# Patient Record
Sex: FEMALE | Race: WHITE | NOT HISPANIC OR LATINO | Employment: UNEMPLOYED | URBAN - METROPOLITAN AREA
[De-identification: names, ages, dates, MRNs, and addresses within clinical notes are randomized per-mention and may not be internally consistent; named-entity substitution may affect disease eponyms.]

---

## 2019-04-02 ENCOUNTER — OFFICE VISIT (OUTPATIENT)
Dept: FAMILY MEDICINE CLINIC | Facility: CLINIC | Age: 59
End: 2019-04-02
Payer: COMMERCIAL

## 2019-04-02 VITALS
DIASTOLIC BLOOD PRESSURE: 82 MMHG | HEIGHT: 66 IN | WEIGHT: 163 LBS | SYSTOLIC BLOOD PRESSURE: 118 MMHG | HEART RATE: 85 BPM | OXYGEN SATURATION: 98 % | RESPIRATION RATE: 16 BRPM | TEMPERATURE: 98.3 F | BODY MASS INDEX: 26.2 KG/M2

## 2019-04-02 DIAGNOSIS — Z12.31 VISIT FOR SCREENING MAMMOGRAM: ICD-10-CM

## 2019-04-02 DIAGNOSIS — F33.42 RECURRENT MAJOR DEPRESSIVE DISORDER, IN FULL REMISSION (HCC): Primary | ICD-10-CM

## 2019-04-02 DIAGNOSIS — F51.01 PRIMARY INSOMNIA: ICD-10-CM

## 2019-04-02 DIAGNOSIS — M06.09 RHEUMATOID ARTHRITIS OF MULTIPLE SITES WITH NEGATIVE RHEUMATOID FACTOR (HCC): ICD-10-CM

## 2019-04-02 DIAGNOSIS — E66.3 OVERWEIGHT (BMI 25.0-29.9): ICD-10-CM

## 2019-04-02 PROBLEM — M05.79 RHEUMATOID ARTHRITIS INVOLVING MULTIPLE SITES WITH POSITIVE RHEUMATOID FACTOR (HCC): Status: ACTIVE | Noted: 2019-04-02

## 2019-04-02 PROCEDURE — 3008F BODY MASS INDEX DOCD: CPT | Performed by: FAMILY MEDICINE

## 2019-04-02 PROCEDURE — 99204 OFFICE O/P NEW MOD 45 MIN: CPT | Performed by: FAMILY MEDICINE

## 2019-04-02 PROCEDURE — 1036F TOBACCO NON-USER: CPT | Performed by: FAMILY MEDICINE

## 2019-04-02 RX ORDER — BUPROPION HYDROCHLORIDE 300 MG/1
300 TABLET ORAL EVERY MORNING
Qty: 90 TABLET | Refills: 0 | Status: SHIPPED | OUTPATIENT
Start: 2019-04-02 | End: 2019-04-30 | Stop reason: SDUPTHER

## 2019-04-02 RX ORDER — ESTRADIOL 10 UG/1
1 INSERT VAGINAL DAILY
COMMUNITY
End: 2019-04-02 | Stop reason: ALTCHOICE

## 2019-04-02 RX ORDER — CALCIUM CARBONATE 200(500)MG
1 TABLET,CHEWABLE ORAL DAILY
COMMUNITY
End: 2019-10-24

## 2019-04-02 RX ORDER — PHENTERMINE HYDROCHLORIDE 37.5 MG/1
37.5 TABLET ORAL DAILY
Qty: 30 TABLET | Refills: 0 | Status: SHIPPED | OUTPATIENT
Start: 2019-04-02 | End: 2019-05-02 | Stop reason: SDUPTHER

## 2019-04-02 RX ORDER — TRAZODONE HYDROCHLORIDE 100 MG/1
100 TABLET ORAL
COMMUNITY
End: 2019-05-02 | Stop reason: ALTCHOICE

## 2019-04-02 RX ORDER — FOLIC ACID 1 MG/1
1 TABLET ORAL DAILY
COMMUNITY

## 2019-04-02 RX ORDER — EPINEPHRINE NASAL SOLUTION 1 MG/ML
0.5 SOLUTION NASAL AS NEEDED
COMMUNITY
End: 2021-04-02

## 2019-04-02 RX ORDER — SERTRALINE HYDROCHLORIDE 100 MG/1
150 TABLET, FILM COATED ORAL DAILY
Qty: 135 TABLET | Refills: 0 | Status: SHIPPED | OUTPATIENT
Start: 2019-04-02 | End: 2019-04-30 | Stop reason: SDUPTHER

## 2019-04-02 RX ORDER — SERTRALINE HYDROCHLORIDE 100 MG/1
50 TABLET, FILM COATED ORAL DAILY
COMMUNITY
End: 2019-04-02 | Stop reason: SDUPTHER

## 2019-04-02 RX ORDER — TRIAMCINOLONE ACETONIDE 55 UG/1
SPRAY, METERED NASAL
COMMUNITY
End: 2019-05-02 | Stop reason: ALTCHOICE

## 2019-04-16 ENCOUNTER — OFFICE VISIT (OUTPATIENT)
Dept: RHEUMATOLOGY | Facility: CLINIC | Age: 59
End: 2019-04-16
Payer: COMMERCIAL

## 2019-04-16 ENCOUNTER — TELEPHONE (OUTPATIENT)
Dept: RHEUMATOLOGY | Facility: CLINIC | Age: 59
End: 2019-04-16

## 2019-04-16 VITALS
HEART RATE: 91 BPM | BODY MASS INDEX: 25.88 KG/M2 | DIASTOLIC BLOOD PRESSURE: 71 MMHG | SYSTOLIC BLOOD PRESSURE: 104 MMHG | WEIGHT: 161 LBS | HEIGHT: 66 IN

## 2019-04-16 DIAGNOSIS — M06.09 RHEUMATOID ARTHRITIS OF MULTIPLE SITES WITH NEGATIVE RHEUMATOID FACTOR (HCC): ICD-10-CM

## 2019-04-16 DIAGNOSIS — Z79.899 LONG-TERM USE OF IMMUNOSUPPRESSANT MEDICATION: ICD-10-CM

## 2019-04-16 DIAGNOSIS — M06.9 RHEUMATOID ARTHRITIS INVOLVING MULTIPLE SITES, UNSPECIFIED RHEUMATOID FACTOR PRESENCE: Primary | ICD-10-CM

## 2019-04-16 DIAGNOSIS — M85.80 OSTEOPENIA, UNSPECIFIED LOCATION: ICD-10-CM

## 2019-04-16 DIAGNOSIS — M47.816 OSTEOARTHRITIS OF LUMBAR SPINE, UNSPECIFIED SPINAL OSTEOARTHRITIS COMPLICATION STATUS: ICD-10-CM

## 2019-04-16 PROCEDURE — 99244 OFF/OP CNSLTJ NEW/EST MOD 40: CPT | Performed by: INTERNAL MEDICINE

## 2019-04-19 ENCOUNTER — APPOINTMENT (OUTPATIENT)
Dept: LAB | Facility: HOSPITAL | Age: 59
End: 2019-04-19
Attending: INTERNAL MEDICINE
Payer: COMMERCIAL

## 2019-04-19 ENCOUNTER — TRANSCRIBE ORDERS (OUTPATIENT)
Dept: ADMINISTRATIVE | Facility: HOSPITAL | Age: 59
End: 2019-04-19

## 2019-04-19 ENCOUNTER — HOSPITAL ENCOUNTER (OUTPATIENT)
Dept: RADIOLOGY | Facility: HOSPITAL | Age: 59
Discharge: HOME | End: 2019-04-19
Payer: COMMERCIAL

## 2019-04-19 ENCOUNTER — HOSPITAL ENCOUNTER (OUTPATIENT)
Dept: RADIOLOGY | Facility: HOSPITAL | Age: 59
Discharge: HOME | End: 2019-04-19
Attending: INTERNAL MEDICINE
Payer: COMMERCIAL

## 2019-04-19 VITALS — WEIGHT: 161 LBS | HEIGHT: 66 IN | BODY MASS INDEX: 25.88 KG/M2

## 2019-04-19 DIAGNOSIS — M06.9 RHEUMATOID ARTHRITIS, INVOLVING UNSPECIFIED SITE, UNSPECIFIED RHEUMATOID FACTOR PRESENCE: Primary | ICD-10-CM

## 2019-04-19 DIAGNOSIS — Z12.31 VISIT FOR SCREENING MAMMOGRAM: ICD-10-CM

## 2019-04-19 DIAGNOSIS — M06.9 RHEUMATOID ARTHRITIS INVOLVING MULTIPLE SITES, UNSPECIFIED RHEUMATOID FACTOR PRESENCE: ICD-10-CM

## 2019-04-19 DIAGNOSIS — M06.09 RHEUMATOID ARTHRITIS OF MULTIPLE SITES WITH NEGATIVE RHEUMATOID FACTOR (HCC): ICD-10-CM

## 2019-04-19 DIAGNOSIS — M06.9 RHEUMATOID ARTHRITIS, INVOLVING UNSPECIFIED SITE, UNSPECIFIED RHEUMATOID FACTOR PRESENCE: ICD-10-CM

## 2019-04-19 LAB
25(OH)D3 SERPL-MCNC: 64.9 NG/ML (ref 30–100)
BASOPHILS # BLD AUTO: 0.02 THOUSANDS/ΜL (ref 0–0.1)
BASOPHILS NFR BLD AUTO: 0 % (ref 0–1)
CRP SERPL QL: <3 MG/L
EOSINOPHIL # BLD AUTO: 0.05 THOUSAND/ΜL (ref 0–0.61)
EOSINOPHIL NFR BLD AUTO: 1 % (ref 0–6)
ERYTHROCYTE [DISTWIDTH] IN BLOOD BY AUTOMATED COUNT: 12.5 % (ref 11.6–15.1)
ERYTHROCYTE [SEDIMENTATION RATE] IN BLOOD: 25 MM/HOUR (ref 2–25)
HCT VFR BLD AUTO: 36.5 % (ref 34.8–46.1)
HGB BLD-MCNC: 12.3 G/DL (ref 11.5–15.4)
IMM GRANULOCYTES # BLD AUTO: 0.04 THOUSAND/UL (ref 0–0.2)
IMM GRANULOCYTES NFR BLD AUTO: 1 % (ref 0–2)
LYMPHOCYTES # BLD AUTO: 1.41 THOUSANDS/ΜL (ref 0.6–4.47)
LYMPHOCYTES NFR BLD AUTO: 18 % (ref 14–44)
MCH RBC QN AUTO: 32 PG (ref 26.8–34.3)
MCHC RBC AUTO-ENTMCNC: 33.7 G/DL (ref 31.4–37.4)
MCV RBC AUTO: 95 FL (ref 82–98)
MONOCYTES # BLD AUTO: 0.69 THOUSAND/ΜL (ref 0.17–1.22)
MONOCYTES NFR BLD AUTO: 9 % (ref 4–12)
NEUTROPHILS # BLD AUTO: 5.55 THOUSANDS/ΜL (ref 1.85–7.62)
NEUTS SEG NFR BLD AUTO: 71 % (ref 43–75)
NRBC BLD AUTO-RTO: 0 /100 WBCS
PLATELET # BLD AUTO: 291 THOUSANDS/UL (ref 149–390)
PMV BLD AUTO: 9.2 FL (ref 8.9–12.7)
RBC # BLD AUTO: 3.84 MILLION/UL (ref 3.81–5.12)
WBC # BLD AUTO: 7.76 THOUSAND/UL (ref 4.31–10.16)

## 2019-04-19 PROCEDURE — 86480 TB TEST CELL IMMUN MEASURE: CPT

## 2019-04-19 PROCEDURE — 86140 C-REACTIVE PROTEIN: CPT

## 2019-04-19 PROCEDURE — 82306 VITAMIN D 25 HYDROXY: CPT

## 2019-04-19 PROCEDURE — 86803 HEPATITIS C AB TEST: CPT

## 2019-04-19 PROCEDURE — 73130 X-RAY EXAM OF HAND: CPT

## 2019-04-19 PROCEDURE — 86705 HEP B CORE ANTIBODY IGM: CPT

## 2019-04-19 PROCEDURE — 36415 COLL VENOUS BLD VENIPUNCTURE: CPT

## 2019-04-19 PROCEDURE — 85652 RBC SED RATE AUTOMATED: CPT

## 2019-04-19 PROCEDURE — 86430 RHEUMATOID FACTOR TEST QUAL: CPT

## 2019-04-19 PROCEDURE — 77063 BREAST TOMOSYNTHESIS BI: CPT

## 2019-04-19 PROCEDURE — 86704 HEP B CORE ANTIBODY TOTAL: CPT

## 2019-04-19 PROCEDURE — 73630 X-RAY EXAM OF FOOT: CPT

## 2019-04-19 PROCEDURE — 85025 COMPLETE CBC W/AUTO DIFF WBC: CPT

## 2019-04-19 PROCEDURE — 77067 SCR MAMMO BI INCL CAD: CPT

## 2019-04-19 PROCEDURE — 86200 CCP ANTIBODY: CPT

## 2019-04-19 PROCEDURE — 87340 HEPATITIS B SURFACE AG IA: CPT

## 2019-04-20 LAB
HBV CORE AB SER QL: NORMAL
HBV CORE IGM SER QL: NORMAL
HBV SURFACE AG SER QL: NORMAL
HCV AB SER QL: NORMAL

## 2019-04-22 DIAGNOSIS — M06.9 RHEUMATOID ARTHRITIS INVOLVING MULTIPLE SITES, UNSPECIFIED RHEUMATOID FACTOR PRESENCE: Primary | ICD-10-CM

## 2019-04-22 LAB
GAMMA INTERFERON BACKGROUND BLD IA-ACNC: 0.03 IU/ML
M TB IFN-G BLD-IMP: NEGATIVE
M TB IFN-G CD4+ BCKGRND COR BLD-ACNC: 0 IU/ML
M TB IFN-G CD4+ BCKGRND COR BLD-ACNC: 0 IU/ML
MITOGEN IGNF BCKGRD COR BLD-ACNC: >10 IU/ML
RHEUMATOID FACT SER QL LA: NEGATIVE

## 2019-04-23 ENCOUNTER — TELEPHONE (OUTPATIENT)
Dept: OBGYN CLINIC | Facility: CLINIC | Age: 59
End: 2019-04-23

## 2019-04-23 LAB — CCP IGA+IGG SERPL IA-ACNC: 5 UNITS (ref 0–19)

## 2019-04-26 ENCOUNTER — APPOINTMENT (OUTPATIENT)
Dept: LAB | Facility: HOSPITAL | Age: 59
End: 2019-04-26
Payer: COMMERCIAL

## 2019-04-26 ENCOUNTER — TRANSCRIBE ORDERS (OUTPATIENT)
Dept: ADMINISTRATIVE | Facility: HOSPITAL | Age: 59
End: 2019-04-26

## 2019-04-26 DIAGNOSIS — M06.09 RHEUMATOID ARTHRITIS OF MULTIPLE SITES WITHOUT RHEUMATOID FACTOR (HCC): ICD-10-CM

## 2019-04-26 DIAGNOSIS — M06.09 RHEUMATOID ARTHRITIS OF MULTIPLE SITES WITHOUT RHEUMATOID FACTOR (HCC): Primary | ICD-10-CM

## 2019-04-26 LAB
ALBUMIN SERPL BCP-MCNC: 3.6 G/DL (ref 3.5–5)
ALP SERPL-CCNC: 127 U/L (ref 46–116)
ALT SERPL W P-5'-P-CCNC: 25 U/L (ref 12–78)
ANION GAP SERPL CALCULATED.3IONS-SCNC: 9 MMOL/L (ref 4–13)
AST SERPL W P-5'-P-CCNC: 24 U/L (ref 5–45)
BILIRUB SERPL-MCNC: 0.3 MG/DL (ref 0.2–1)
BUN SERPL-MCNC: 20 MG/DL (ref 5–25)
CALCIUM SERPL-MCNC: 8.9 MG/DL (ref 8.3–10.1)
CHLORIDE SERPL-SCNC: 107 MMOL/L (ref 100–108)
CHOLEST SERPL-MCNC: 207 MG/DL (ref 50–200)
CO2 SERPL-SCNC: 23 MMOL/L (ref 21–32)
CREAT SERPL-MCNC: 0.91 MG/DL (ref 0.6–1.3)
GFR SERPL CREATININE-BSD FRML MDRD: 70 ML/MIN/1.73SQ M
GLUCOSE P FAST SERPL-MCNC: 83 MG/DL (ref 65–99)
HDLC SERPL-MCNC: 70 MG/DL (ref 40–60)
LDLC SERPL CALC-MCNC: 128 MG/DL (ref 0–100)
POTASSIUM SERPL-SCNC: 4.2 MMOL/L (ref 3.5–5.3)
PROT SERPL-MCNC: 7 G/DL (ref 6.4–8.2)
SODIUM SERPL-SCNC: 139 MMOL/L (ref 136–145)
TRIGL SERPL-MCNC: 44 MG/DL

## 2019-04-26 PROCEDURE — 36415 COLL VENOUS BLD VENIPUNCTURE: CPT | Performed by: FAMILY MEDICINE

## 2019-04-26 PROCEDURE — 80053 COMPREHEN METABOLIC PANEL: CPT | Performed by: FAMILY MEDICINE

## 2019-04-26 PROCEDURE — 80061 LIPID PANEL: CPT

## 2019-04-30 DIAGNOSIS — F33.42 RECURRENT MAJOR DEPRESSIVE DISORDER, IN FULL REMISSION (HCC): ICD-10-CM

## 2019-04-30 RX ORDER — BUPROPION HYDROCHLORIDE 300 MG/1
300 TABLET ORAL EVERY MORNING
Qty: 90 TABLET | Refills: 0 | Status: SHIPPED | OUTPATIENT
Start: 2019-04-30 | End: 2019-05-02 | Stop reason: SDUPTHER

## 2019-04-30 RX ORDER — SERTRALINE HYDROCHLORIDE 100 MG/1
150 TABLET, FILM COATED ORAL DAILY
Qty: 135 TABLET | Refills: 0 | Status: SHIPPED | OUTPATIENT
Start: 2019-04-30 | End: 2019-05-02 | Stop reason: SDUPTHER

## 2019-05-02 ENCOUNTER — OFFICE VISIT (OUTPATIENT)
Dept: FAMILY MEDICINE CLINIC | Facility: CLINIC | Age: 59
End: 2019-05-02
Payer: COMMERCIAL

## 2019-05-02 VITALS
BODY MASS INDEX: 25.39 KG/M2 | WEIGHT: 158 LBS | HEART RATE: 71 BPM | DIASTOLIC BLOOD PRESSURE: 80 MMHG | RESPIRATION RATE: 12 BRPM | OXYGEN SATURATION: 99 % | SYSTOLIC BLOOD PRESSURE: 122 MMHG | HEIGHT: 66 IN

## 2019-05-02 DIAGNOSIS — F33.42 RECURRENT MAJOR DEPRESSIVE DISORDER, IN FULL REMISSION (HCC): ICD-10-CM

## 2019-05-02 DIAGNOSIS — M06.09 RHEUMATOID ARTHRITIS OF MULTIPLE SITES WITH NEGATIVE RHEUMATOID FACTOR (HCC): ICD-10-CM

## 2019-05-02 DIAGNOSIS — Z00.00 ANNUAL PHYSICAL EXAM: Primary | ICD-10-CM

## 2019-05-02 DIAGNOSIS — F51.01 PRIMARY INSOMNIA: ICD-10-CM

## 2019-05-02 DIAGNOSIS — E66.3 OVERWEIGHT (BMI 25.0-29.9): ICD-10-CM

## 2019-05-02 PROCEDURE — 99396 PREV VISIT EST AGE 40-64: CPT | Performed by: FAMILY MEDICINE

## 2019-05-02 RX ORDER — BUPROPION HYDROCHLORIDE 300 MG/1
300 TABLET ORAL EVERY MORNING
Qty: 90 TABLET | Refills: 0 | Status: SHIPPED | OUTPATIENT
Start: 2019-05-02 | End: 2019-10-19 | Stop reason: SDUPTHER

## 2019-05-02 RX ORDER — SERTRALINE HYDROCHLORIDE 100 MG/1
150 TABLET, FILM COATED ORAL DAILY
Qty: 135 TABLET | Refills: 0 | Status: SHIPPED | OUTPATIENT
Start: 2019-05-02 | End: 2019-07-20 | Stop reason: SDUPTHER

## 2019-05-02 RX ORDER — PHENTERMINE HYDROCHLORIDE 37.5 MG/1
37.5 TABLET ORAL DAILY
Qty: 30 TABLET | Refills: 0 | Status: SHIPPED | OUTPATIENT
Start: 2019-05-02 | End: 2019-05-29 | Stop reason: SDUPTHER

## 2019-05-08 ENCOUNTER — APPOINTMENT (OUTPATIENT)
Dept: RADIOLOGY | Facility: CLINIC | Age: 59
End: 2019-05-08
Payer: COMMERCIAL

## 2019-05-08 ENCOUNTER — OFFICE VISIT (OUTPATIENT)
Dept: URGENT CARE | Facility: CLINIC | Age: 59
End: 2019-05-08
Payer: COMMERCIAL

## 2019-05-08 VITALS
RESPIRATION RATE: 20 BRPM | BODY MASS INDEX: 26.05 KG/M2 | HEART RATE: 98 BPM | WEIGHT: 162.1 LBS | HEIGHT: 66 IN | DIASTOLIC BLOOD PRESSURE: 60 MMHG | TEMPERATURE: 99.5 F | OXYGEN SATURATION: 100 % | SYSTOLIC BLOOD PRESSURE: 102 MMHG

## 2019-05-08 DIAGNOSIS — R06.00 DYSPNEA, UNSPECIFIED TYPE: ICD-10-CM

## 2019-05-08 DIAGNOSIS — R06.00 DYSPNEA, UNSPECIFIED TYPE: Primary | ICD-10-CM

## 2019-05-08 PROCEDURE — 99213 OFFICE O/P EST LOW 20 MIN: CPT | Performed by: FAMILY MEDICINE

## 2019-05-08 PROCEDURE — 71046 X-RAY EXAM CHEST 2 VIEWS: CPT

## 2019-05-08 RX ORDER — PREDNISONE 20 MG/1
20 TABLET ORAL DAILY
Qty: 5 TABLET | Refills: 0 | Status: SHIPPED | OUTPATIENT
Start: 2019-05-08 | End: 2019-05-13

## 2019-05-29 DIAGNOSIS — F33.42 RECURRENT MAJOR DEPRESSIVE DISORDER, IN FULL REMISSION (HCC): ICD-10-CM

## 2019-05-29 DIAGNOSIS — E66.3 OVERWEIGHT (BMI 25.0-29.9): ICD-10-CM

## 2019-05-29 RX ORDER — PHENTERMINE HYDROCHLORIDE 37.5 MG/1
TABLET ORAL
Qty: 30 TABLET | Refills: 0 | Status: SHIPPED | OUTPATIENT
Start: 2019-05-29 | End: 2019-06-26 | Stop reason: SDUPTHER

## 2019-06-26 DIAGNOSIS — E66.3 OVERWEIGHT (BMI 25.0-29.9): ICD-10-CM

## 2019-06-26 DIAGNOSIS — F33.42 RECURRENT MAJOR DEPRESSIVE DISORDER, IN FULL REMISSION (HCC): ICD-10-CM

## 2019-06-26 RX ORDER — PHENTERMINE HYDROCHLORIDE 37.5 MG/1
TABLET ORAL
Qty: 30 TABLET | Refills: 0 | Status: SHIPPED | OUTPATIENT
Start: 2019-06-26 | End: 2019-07-16 | Stop reason: SDUPTHER

## 2019-07-16 ENCOUNTER — OFFICE VISIT (OUTPATIENT)
Dept: FAMILY MEDICINE CLINIC | Facility: CLINIC | Age: 59
End: 2019-07-16
Payer: COMMERCIAL

## 2019-07-16 VITALS
HEIGHT: 66 IN | DIASTOLIC BLOOD PRESSURE: 70 MMHG | HEART RATE: 82 BPM | SYSTOLIC BLOOD PRESSURE: 118 MMHG | WEIGHT: 154.6 LBS | RESPIRATION RATE: 12 BRPM | BODY MASS INDEX: 24.85 KG/M2 | OXYGEN SATURATION: 99 %

## 2019-07-16 DIAGNOSIS — E66.3 OVERWEIGHT (BMI 25.0-29.9): ICD-10-CM

## 2019-07-16 DIAGNOSIS — F51.01 PRIMARY INSOMNIA: Primary | ICD-10-CM

## 2019-07-16 DIAGNOSIS — F33.42 RECURRENT MAJOR DEPRESSIVE DISORDER, IN FULL REMISSION (HCC): ICD-10-CM

## 2019-07-16 DIAGNOSIS — M06.09 RHEUMATOID ARTHRITIS OF MULTIPLE SITES WITH NEGATIVE RHEUMATOID FACTOR (HCC): ICD-10-CM

## 2019-07-16 PROCEDURE — 3008F BODY MASS INDEX DOCD: CPT | Performed by: FAMILY MEDICINE

## 2019-07-16 PROCEDURE — 99214 OFFICE O/P EST MOD 30 MIN: CPT | Performed by: FAMILY MEDICINE

## 2019-07-16 PROCEDURE — 1036F TOBACCO NON-USER: CPT | Performed by: FAMILY MEDICINE

## 2019-07-16 RX ORDER — ESZOPICLONE 2 MG/1
2 TABLET, FILM COATED ORAL
Qty: 90 TABLET | Refills: 0 | Status: SHIPPED | OUTPATIENT
Start: 2019-07-16 | End: 2019-09-18 | Stop reason: ALTCHOICE

## 2019-07-16 RX ORDER — PHENTERMINE HYDROCHLORIDE 37.5 MG/1
37.5 TABLET ORAL DAILY
Qty: 30 TABLET | Refills: 0 | Status: SHIPPED | OUTPATIENT
Start: 2019-07-16 | End: 2019-08-29 | Stop reason: SDUPTHER

## 2019-07-16 NOTE — PROGRESS NOTES
Assessment/Plan:    No problem-specific Assessment & Plan notes found for this encounter  Diagnoses and all orders for this visit:    Primary insomnia  -     eszopiclone (LUNESTA) 2 mg tablet; Take 1 tablet (2 mg total) by mouth daily at bedtime as needed for sleep Take immediately before bedtime    Rheumatoid arthritis of multiple sites with negative rheumatoid factor (HCC)  Comments:  doing well  seeing Rheumatology     Recurrent major depressive disorder, in full remission ([facility] [address] )  Comments:  doing well  continue current meds    Overweight (BMI 25 0-29 9)  -     phentermine (ADIPEX-P) 37 5 MG tablet; Take 1 tablet (37 5 mg total) by mouth daily          Subjective:      Patient ID: Vickki Hashimoto is a 61 y o  female  Here to follow up  Lost 8 pounds in last 2 months on Phentermine   She states that she could eat better  Staying active   Bels[employer] didn't get approve     Anxiety   Presents for follow-up visit  Symptoms include nervous/anxious behavior  Patient reports no chest pain, compulsions, confusion, decreased concentration, depressed mood, dizziness, excessive worry, feeling of choking, hyperventilation, impotence, insomnia, irritability, malaise, muscle tension, nausea, obsessions, palpitations, panic, restlessness, shortness of breath or suicidal ideas  Symptoms occur occasionally  The quality of sleep is good  Compliance with medications is %  The following portions of the patient's history were reviewed and updated as appropriate: allergies, current medications, past family history, past medical history, past social history, past surgical history and problem list     Review of Systems   Constitutional: Negative  Negative for irritability  HENT: Negative  Eyes: Negative  Respiratory: Negative for shortness of breath  Cardiovascular: Negative for chest pain and palpitations  Gastrointestinal: Negative for nausea  Endocrine: Negative  Genitourinary: Negative  Negative for impotence  Musculoskeletal: Negative  Neurological: Negative for dizziness  Hematological: Negative  Psychiatric/Behavioral: Negative for confusion, decreased concentration and suicidal ideas  The patient is nervous/anxious  The patient does not have insomnia  Objective:      /70 (BP Location: Left arm, Patient Position: Sitting, Cuff Size: Standard)   Pulse 82   Resp 12   Ht 5' 6" (1 676 m)   Wt 70 1 kg (154 lb 9 6 oz)   SpO2 99%   BMI 24 95 kg/m²          Physical Exam   Constitutional: She is oriented to person, place, and time  She appears well-developed and well-nourished  HENT:   Head: Normocephalic and atraumatic  Eyes: Pupils are equal, round, and reactive to light  EOM are normal    Neck: No tracheal deviation present  No thyromegaly present  Cardiovascular: Normal rate and regular rhythm  Pulmonary/Chest: Effort normal and breath sounds normal    Musculoskeletal: Normal range of motion  She exhibits no edema or deformity  Neurological: She is alert and oriented to person, place, and time  Psychiatric: She has a normal mood and affect   Her behavior is normal

## 2019-07-19 ENCOUNTER — TELEPHONE (OUTPATIENT)
Dept: OBGYN CLINIC | Facility: HOSPITAL | Age: 59
End: 2019-07-19

## 2019-07-19 DIAGNOSIS — M06.09 RHEUMATOID ARTHRITIS OF MULTIPLE SITES WITH NEGATIVE RHEUMATOID FACTOR (HCC): ICD-10-CM

## 2019-07-19 DIAGNOSIS — Z79.899 LONG TERM USE OF DRUG: Primary | ICD-10-CM

## 2019-07-19 NOTE — TELEPHONE ENCOUNTER
Please let patient know she is due for regular methotrexate lab monitoring  I will refill the rx for her but she needs to get labs done within the next 1 week  Will place orders in the chart for her to get them done

## 2019-07-19 NOTE — TELEPHONE ENCOUNTER
Pt contacted Call Center requested refill of their medication  Medication Name:  methotrexate     Dosage of Med:  2 5    Frequency of Med:  8 pills weekly    Remaining Medication:  16 pills    Pharmacy and Location:  [facility] 3 month prescription    Pt   Preferred Callback Phone Number:  [phone]

## 2019-07-20 DIAGNOSIS — F33.42 RECURRENT MAJOR DEPRESSIVE DISORDER, IN FULL REMISSION (HCC): ICD-10-CM

## 2019-07-20 RX ORDER — SERTRALINE HYDROCHLORIDE 100 MG/1
TABLET, FILM COATED ORAL
Qty: 135 TABLET | Refills: 0 | Status: SHIPPED | OUTPATIENT
Start: 2019-07-20 | End: 2020-01-09 | Stop reason: SDUPTHER

## 2019-08-08 DIAGNOSIS — Z12.11 SCREENING FOR COLON CANCER: Primary | ICD-10-CM

## 2019-08-29 DIAGNOSIS — E66.3 OVERWEIGHT (BMI 25.0-29.9): ICD-10-CM

## 2019-08-29 RX ORDER — PHENTERMINE HYDROCHLORIDE 37.5 MG/1
TABLET ORAL
Qty: 30 TABLET | Refills: 0 | Status: SHIPPED | OUTPATIENT
Start: 2019-08-29 | End: 2019-09-18 | Stop reason: SDUPTHER

## 2019-09-09 ENCOUNTER — OFFICE VISIT (OUTPATIENT)
Dept: RHEUMATOLOGY | Facility: CLINIC | Age: 59
End: 2019-09-09
Payer: COMMERCIAL

## 2019-09-09 VITALS
BODY MASS INDEX: 24.4 KG/M2 | WEIGHT: 151.8 LBS | DIASTOLIC BLOOD PRESSURE: 73 MMHG | SYSTOLIC BLOOD PRESSURE: 107 MMHG | HEART RATE: 87 BPM | HEIGHT: 66 IN

## 2019-09-09 DIAGNOSIS — Z79.899 LONG-TERM USE OF IMMUNOSUPPRESSANT MEDICATION: ICD-10-CM

## 2019-09-09 DIAGNOSIS — Z79.899 HIGH RISK MEDICATION USE: ICD-10-CM

## 2019-09-09 DIAGNOSIS — M06.00 SERONEGATIVE RHEUMATOID ARTHRITIS (HCC): Primary | ICD-10-CM

## 2019-09-09 DIAGNOSIS — M85.80 OSTEOPENIA, UNSPECIFIED LOCATION: ICD-10-CM

## 2019-09-09 PROCEDURE — 99214 OFFICE O/P EST MOD 30 MIN: CPT | Performed by: INTERNAL MEDICINE

## 2019-09-09 NOTE — PROGRESS NOTES
Assessment and Plan:   Ms Dania Welch is a 14-year-old female with history significant for rheumatoid arthritis, osteopenia and lumbar degenerative arthritis, who presents for follow-up  She is currently on subcutaneous Enbrel 50 mg weekly and methotrexate 8 tablets weekly        # Seronegative rheumatoid arthritis  - Hermelinda presents today for follow-up of rheumatoid arthritis, and has been well controlled on a regimen of subcutaneous Enbrel 50 mg weekly and oral methotrexate 8 tablets weekly with folic acid 1 mg daily  She does not report significant complaints in regards to ongoing arthritic symptoms, and there is no evidence of synovitis noted on her physical examination today  In view of this I discussed initiating a slow taper with the methotrexate, and would like her to decrease to 7 tablets weekly  She will continue the Enbrel 50 mg weekly  I advised her to let me know if there is any recurrence of her arthritic complaints with the methotrexate dose decrease  - High risk medication lab monitoring will be repeated today      # Lumbar DJD  - Continue Tylenol as needed      # Osteopenia  - She thinks her last DEXA scan may have been more than 2 years ago  I advised her we will schedule her for an updated exam to monitor for any progression from the osteopenia  In the meanwhile she will continue calcium and vitamin-D supplements daily  Plan:  Diagnoses and all orders for this visit:    Seronegative rheumatoid arthritis ([address] )    Long-term use of immunosuppressant medication    High risk medication use  -     CBC and differential; Future  -     Comprehensive metabolic panel; Future    Osteopenia, unspecified location  -     DXA bone density spine hip and pelvis; Future      I have personally reviewed pertinent films in PACS which does not show any inflammatory changes of the bilateral hand x-rays         Activities as tolerated    Diet: low carb/low fat, more greens/vegetables, adequate hydration  Exercise: try to maintain a low impact exercise regimen as much as possible  Walk for 30 minutes a day for at least 3 days a week    Encouraged to maintain good sleep hygiene  Continue other medications as prescribed by PCP and other specialists        RTC in 4 months  HPI    INITIAL VISIT NOTE:  Ms Clarisa Anne is a 59-year-old female with history significant for rheumatoid arthritis, osteopenia and lumbar degenerative arthritis, who presents for further management of rheumatoid arthritis and establishing with [facility]  She is currently on subcutaneous Enbrel 50 mg weekly and methotrexate 8 tablets weekly  She is referred by Dr Nancie Tang       Patient reports she was diagnosed with rheumatoid arthritis at the age of 27 when she initially presented with left knee pain and swelling  She states since then it has gradually progressed to involve multiple joints  She has tried multiple medications including hydroxychloroquine which caused a skin rash, gold injections, sulfasalazine and possibly other injectables although she cannot recall the names  She has been well maintained on a combination of subcutaneous Enbrel 50 mg weekly and methotrexate 8 tablets weekly with folic acid 1 mg daily for the past 18-20 years  She has not required to change her medications  She states overall her joint pains have been well controlled although on occasion she will notice an achiness sensation involving her hands, wrists and elbows  Intermittently she will notice swelling affecting her hands and feet  She also experiences morning stiffness primarily involving her hands and feet, and states the feet stiffness resolves right away but the hand stiffness persists for less than 30 minutes  She is currently not taking any steroids or over-the-counter pain medications  She refrains from NSAID use due to a history of esophagitis    She mentions previously she had been on chronic courses of steroids but has not required this in the past few years  Within the last year she has been dealing with increasing pain affecting her lower back radiating into her bilateral legs associated with a sensation of weakness  She was seen by Orthopedics in [place] and apparently had imaging done which showed osteoarthritic changes affecting her lumbar spine  She did complete a course of physical therapy prior to relocating to this area  She is not interested in seeing the [facility] at this time  She manages her pain with Tylenol as needed      Overall she has been tolerating the Enbrel and methotrexate well without any concerns for side effects  She denies fevers, chills, unintentional weight loss, mouth ulcers, skin rash or GI issues  No other complaints noted at this time  9/9/2019:  Patient presents for a follow-up today  She is currently on subcutaneous Enbrel 50 mg weekly and oral methotrexate 8 tablets weekly with folic acid 1 mg daily  We reviewed her labs done following the prior office visit which revealed an unremarkable CBC, CMP, ESR, CRP, chronic hepatitis panel, QuantiFERON TB gold, vitamin-D, rheumatoid factor and anti CCP antibody  X-rays of her bilateral hands and feet did not show any inflammatory changes, but showed very minimal degenerative changes  She reports overall being stable on her current regimen of the Enbrel and methotrexate  She has not had any flare-ups of joint pain or swelling  Only on occasion will she experience mild pain around her hips and knees  She does experience morning stiffness which affects her diffusely, but lasts only a few minutes  She has not had to take any over-the-counter pain medications for her joint pains  She has been tolerating both medications well without any concerns for side effects or recurrent infections  When she does experience an achy sensation in her bilateral legs she does take Tylenol if needed      She is unsure when her last bone density exam was, but thinks it may have been more than 2 years ago  She does take calcium and vitamin-D supplements daily for a history of osteopenia  She denies any other complaints at this time  The following portions of the patient's history were reviewed and updated as appropriate: allergies, current medications, past family history, past medical history, past social history, past surgical history and problem list       Review of Systems  Constitutional: Negative for weight change, fevers, chills, night sweats, fatigue  ENT/Mouth: Negative for hearing changes, ear pain, nasal congestion, sinus pain, hoarseness, sore throat, rhinorrhea, swallowing difficulty  Eyes: Negative for pain, redness, discharge, vision changes  Cardiovascular: Negative for chest pain, SOB, palpitations  Respiratory: Negative for cough, sputum, wheezing, dyspnea  Gastrointestinal: Negative for nausea, vomiting, diarrhea, constipation, pain, heartburn  Genitourinary: Negative for dysuria, urinary frequency, hematuria  Musculoskeletal: As per HPI  Skin: Negative for skin rash, color changes  Neuro: Negative for weakness, numbness, tingling, loss of consciousness  Positive for headache  Psych: Negative for anxiety, depression  Heme/Lymph: Negative for easy bruising, bleeding, lymphadenopathy          Past Medical History:   Diagnosis Date    Anxiety     Depression     Rheumatoid arthritis ([facility] [address] )        Past Surgical History:   Procedure Laterality Date    AUGMENTATION MAMMAPLASTY Bilateral 2010    BREAST BIOPSY Right 2008    benign    KNEE SURGERY      SINUS SURGERY         Social History     Socioeconomic History    Marital status: /Civil Union     Spouse name: Not on file    Number of children: Not on file    Years of education: Not on file    Highest education level: Not on file   Occupational History    Not on file   Social Needs    Financial resource strain: Not on file   [facility] Food insecurity:     Worry: Not on file     Inability: Not on file    Transportation needs:     Medical: Not on file     Non-medical: Not on file   Tobacco Use    Smoking status: Never Smoker    Smokeless tobacco: Never Used   Substance and Sexual Activity    Alcohol use:  Yes     Alcohol/week: 2 0 standard drinks     Types: 2 Glasses of wine per week     Frequency: Monthly or less     Drinks per session: 1 or 2     Binge frequency: Never    Drug use: Not Currently    Sexual activity: Not on file   Lifestyle    Physical activity:     Days per week: Not on file     Minutes per session: Not on file    Stress: Not on file   Relationships    Social connections:     Talks on phone: Not on file     Gets together: Not on file     Attends [employer] service: Not on file     Active member of club or organization: Not on file     Attends meetings of clubs or organizations: Not on file     Relationship status: Not on file    Intimate partner violence:     Fear of current or ex partner: Not on file     Emotionally abused: Not on file     Physically abused: Not on file     Forced sexual activity: Not on file   Other Topics Concern    Not on file   Social History Narrative    Not on file       Family History   Problem Relation Age of Onset    Hypertension Mother     Cancer Mother     Stroke Mother     Crohn's disease Mother     Hyperlipidemia Mother     Heart attack Mother    [facility] Breast cancer Mother 79    Hypertension Father     Cancer Father     Stroke Father     Hyperlipidemia Father     Heart attack Father     Hyperlipidemia Sister     Hyperlipidemia Brother        Allergies   Allergen Reactions    Plaquenil [Hydroxychloroquine] Rash       Current Outpatient Medications:     buPROPion (WELLBUTRIN XL) 300 mg 24 hr tablet, Take 1 tablet (300 mg total) by mouth every morning, Disp: 90 tablet, Rfl: 0    calcium carbonate (TUMS) 500 mg chewable tablet, Chew 1 tablet daily, Disp: , Rfl:     EPINEPHrine (ADRENALIN) 0 1 % nasal solution, 0 5 mL into each nostril as needed, Disp: , Rfl:     eszopiclone (LUNESTA) 2 mg tablet, Take 1 tablet (2 mg total) by mouth daily at bedtime as needed for sleep Take immediately before bedtime, Disp: 90 tablet, Rfl: 0    etanercept (ENBREL SURECLICK) 50 MG/ML injection, Inject 1 mL (50 mg total) under the skin once a week, Disp: 12 Syringe, Rfl: 3    folic acid (FOLVITE) 1 mg tablet, Take by mouth daily, Disp: , Rfl:     methotrexate 2 5 mg tablet, Take 8 tablets (20 mg total) by mouth once a week, Disp: 102 tablet, Rfl: 0    phentermine (ADIPEX-P) 37 5 MG tablet, TAKE 1 TABLET BY MOUTH EVERY DAY, Disp: 30 tablet, Rfl: 0    sertraline (ZOLOFT) 100 mg tablet, TAKE 1 AND 1/2 TABLETS     (=150MG) DAILY, Disp: 135 tablet, Rfl: 0      Objective:    Vitals:    09/09/19 1049   BP: 107/73   BP Location: Left arm   Patient Position: Sitting   Cuff Size: Adult   Pulse: 87   Weight: 68 9 kg (151 lb 12 8 oz)   Height: 5' 6" (1 676 m)       Physical Exam  General: Well appearing, well nourished, in no distress  Oriented x 3, normal mood and affect  Ambulating without difficulty  Skin: Good turgor, no rash, unusual bruising or prominent lesions  Hair: Normal texture and distribution  Nails: Normal color, no deformities  HEENT:  Head: Normocephalic, atraumatic  Eyes: Conjunctiva clear, sclera non-icteric, EOM intact  Nose: No external lesions, mucosa non-inflamed  Mouth: Mucous membranes moist, no mucosal lesions  Neck: Supple, thyroid non-enlarged and non-tender  No lymphadenopathy  Extremities: No amputations or deformities, cyanosis, edema  Musculoskeletal:   There is no peripheral joint soft tissue swelling or tenderness noted  She has mild osteoarthritic changes present at her bilateral DIPs and PIPs  She has full range of motion in all joints  Neurologic: Alert and oriented  No focal neurological deficits appreciated  Psychiatric: Normal mood and affect         Thais tone FELIPE Phillips    Rheumatology

## 2019-09-09 NOTE — PATIENT INSTRUCTIONS
1) Please have labs done and schedule bone density  2) Please decrease methotrexate to 7 tablets weekly

## 2019-09-18 ENCOUNTER — OFFICE VISIT (OUTPATIENT)
Dept: FAMILY MEDICINE CLINIC | Facility: CLINIC | Age: 59
End: 2019-09-18
Payer: COMMERCIAL

## 2019-09-18 VITALS
DIASTOLIC BLOOD PRESSURE: 88 MMHG | RESPIRATION RATE: 18 BRPM | SYSTOLIC BLOOD PRESSURE: 110 MMHG | BODY MASS INDEX: 24.01 KG/M2 | HEART RATE: 58 BPM | WEIGHT: 149.4 LBS | TEMPERATURE: 97.4 F | HEIGHT: 66 IN | OXYGEN SATURATION: 99 %

## 2019-09-18 DIAGNOSIS — Z23 NEED FOR DIPHTHERIA-TETANUS-PERTUSSIS (TDAP) VACCINE: ICD-10-CM

## 2019-09-18 DIAGNOSIS — E66.3 OVERWEIGHT (BMI 25.0-29.9): ICD-10-CM

## 2019-09-18 DIAGNOSIS — F51.01 PRIMARY INSOMNIA: ICD-10-CM

## 2019-09-18 DIAGNOSIS — F33.42 RECURRENT MAJOR DEPRESSIVE DISORDER, IN FULL REMISSION (HCC): Primary | ICD-10-CM

## 2019-09-18 DIAGNOSIS — M06.09 RHEUMATOID ARTHRITIS OF MULTIPLE SITES WITH NEGATIVE RHEUMATOID FACTOR (HCC): ICD-10-CM

## 2019-09-18 DIAGNOSIS — Z23 INFLUENZA VACCINE NEEDED: ICD-10-CM

## 2019-09-18 PROCEDURE — 90682 RIV4 VACC RECOMBINANT DNA IM: CPT

## 2019-09-18 PROCEDURE — 99214 OFFICE O/P EST MOD 30 MIN: CPT | Performed by: FAMILY MEDICINE

## 2019-09-18 PROCEDURE — 90471 IMMUNIZATION ADMIN: CPT

## 2019-09-18 PROCEDURE — 90472 IMMUNIZATION ADMIN EACH ADD: CPT

## 2019-09-18 PROCEDURE — 90715 TDAP VACCINE 7 YRS/> IM: CPT

## 2019-09-18 RX ORDER — PHENTERMINE HYDROCHLORIDE 37.5 MG/1
37.5 TABLET ORAL DAILY
Qty: 30 TABLET | Refills: 0 | Status: SHIPPED | OUTPATIENT
Start: 2019-09-18 | End: 2019-11-22 | Stop reason: SDUPTHER

## 2019-09-18 RX ORDER — ZALEPLON 10 MG/1
10 CAPSULE ORAL
Qty: 30 CAPSULE | Refills: 0 | Status: SHIPPED | OUTPATIENT
Start: 2019-09-18 | End: 2019-10-22 | Stop reason: ALTCHOICE

## 2019-09-18 NOTE — PROGRESS NOTES
Assessment/Plan:    No problem-specific Assessment & Plan notes found for this encounter  Diagnoses and all orders for this visit:    Recurrent major depressive disorder, in full remission ([facility] [address] )  Comments:  doing well  continue current meds     Rheumatoid arthritis of multiple sites with negative rheumatoid factor ([facility] )  Comments:  doing well  continue current meds     Primary insomnia  -     zaleplon (SONATA) 10 MG capsule; Take 1 capsule (10 mg total) by mouth daily at bedtime    Overweight (BMI 25 0-29 9)  -     phentermine (ADIPEX-P) 37 5 MG tablet; Take 1 tablet (37 5 mg total) by mouth daily    Influenza vaccine needed  -     influenza vaccine, [id number], quadrivalent, recombinant, PF, 0 5 mL, for patients 18 yr+ (FLUBLOK)    Need for diphtheria-tetanus-pertussis (Tdap) vaccine  -     TDAP VACCINE GREATER THAN OR EQUAL TO 6YO IM          Subjective:      Patient ID: Alex iL is a 61 y o  female  Here to follow up  Her depression has been worsening due to stressors  Lost 6 pounds in 2 months   Eating better and walking daily   Still not sleeping well  Lunesta didn't help     Anxiety   Presents for follow-up visit  Patient reports no chest pain, compulsions, confusion, decreased concentration, depressed mood, dizziness, excessive worry, feeling of choking, hyperventilation, impotence, insomnia, irritability, malaise, muscle tension, nausea, nervous/anxious behavior, obsessions, palpitations, panic, restlessness or shortness of breath  Symptoms occur occasionally  The quality of sleep is good  The following portions of the patient's history were reviewed and updated as appropriate: allergies, current medications, past family history, past medical history, past social history, past surgical history and problem list     Review of Systems   Constitutional: Negative for irritability  Respiratory: Negative for shortness of breath      Cardiovascular: Negative for chest pain and palpitations  Gastrointestinal: Negative for nausea  Genitourinary: Negative for impotence  Neurological: Negative for dizziness  Psychiatric/Behavioral: Negative for confusion and decreased concentration  The patient is not nervous/anxious and does not have insomnia  Objective:      /88 (BP Location: Left arm, Patient Position: Sitting, Cuff Size: Standard)   Pulse 58   Temp (!) 97 4 °F (36 3 °C) (Tympanic)   Resp 18   Ht 5' 6" (1 676 m)   Wt 67 8 kg (149 lb 6 4 oz)   SpO2 99%   BMI 24 11 kg/m²          Physical Exam   Constitutional: She appears well-developed and well-nourished  Eyes: Pupils are equal, round, and reactive to light  EOM are normal    Cardiovascular: Normal rate and regular rhythm  Pulmonary/Chest: Effort normal and breath sounds normal    Abdominal: She exhibits no distension  There is no tenderness  Musculoskeletal: She exhibits no edema, tenderness or deformity  Neurological: No cranial nerve deficit  Coordination normal    Skin: No rash noted  No erythema  No pallor

## 2019-10-04 DIAGNOSIS — Z12.11 SPECIAL SCREENING FOR MALIGNANT NEOPLASMS, COLON: Primary | ICD-10-CM

## 2019-10-14 ENCOUNTER — VBI (OUTPATIENT)
Dept: ADMINISTRATIVE | Facility: OTHER | Age: 59
End: 2019-10-14

## 2019-10-14 NOTE — TELEPHONE ENCOUNTER
Asha Mcgrath [address]   Phone Number: [phone]             I have reviewed both our old [address] and need your assistance in locating Colonoscopy  Per Patient, testing was done by May 10 2012  It was done by Hallie Song MD in [address], [phone]   [facility]  10/14/2019 08:47 AM Phone (Outgoing) Dr Hallie Song MD (Provider) [phone] Remove   No Answer/Busy - Office Closed, no voicemail for non emergencies  By Argenis Stevens MA        Faxed Request with General Consent form to [facility] [place] 10/14/19 9:02 AM     10/22/2019 09:03 AM Phone Denisse Negrete) Haydee Sandoval at answering service Dr Hallie Song MD (Provider) [phone] Remove   Call Complete - Mountain time is 7am, office is not open yet        By Argenis Stevens MA        Refaxed to to [facility] [place] 10/22/19 9:06 AM

## 2019-10-14 NOTE — LETTER
[address], [address] [place] [address]           Call [phone]           Fax [phone]           Fax 2 [phone]    Colonoscopy Report Request Form      Patient's name:Hermelinda Song  ERP:[birth year]    Referring Providers Name:  Cesar Shipley MD    The above patient has informed us that they have completed a Colonoscopy, a Flex Sigmoidoscopy, a CT Colonography, a Cologuard test, or FIT/FOBT test with your office  Please complete this form, include any reports related to the above listed CRC screening tests with pathology reports (if applicable) that you may have and fax it to us at [phone] or [phone] so that we may update our medical records  These reports are needed for  compliance  Date of Colorectal Cancer Screening: _____________________    If patient has not had a Colonoscopy, a Flex Sigmoidoscopy, a CT Colonography, a Cologuard test, or FIT/FOBT test completed with your office, please indicate this below  Comments: ______________________________________________________________________  ______________________________________________________________________  ______________________________________________________________________  ______________________________________________________________________  ____________________________________________________________________________________________________________________________________________      Practice providing examination:     [facility]      Please fax this report to our office as soon as possible to [phone] or [phone]  We thank you for your assistance in treating our mutual patient

## 2019-10-19 DIAGNOSIS — F33.42 RECURRENT MAJOR DEPRESSIVE DISORDER, IN FULL REMISSION (HCC): ICD-10-CM

## 2019-10-19 DIAGNOSIS — M06.09 RHEUMATOID ARTHRITIS OF MULTIPLE SITES WITH NEGATIVE RHEUMATOID FACTOR (HCC): ICD-10-CM

## 2019-10-20 RX ORDER — BUPROPION HYDROCHLORIDE 300 MG/1
TABLET ORAL
Qty: 90 TABLET | Refills: 0 | Status: SHIPPED | OUTPATIENT
Start: 2019-10-20 | End: 2020-01-09 | Stop reason: SDUPTHER

## 2019-10-21 NOTE — TELEPHONE ENCOUNTER
Please advise patient she needs to have her CBC and CMP done prior to me refilling the methotrexate  Thanks

## 2019-10-22 DIAGNOSIS — F51.01 PRIMARY INSOMNIA: Primary | ICD-10-CM

## 2019-10-22 RX ORDER — ZOLPIDEM TARTRATE 12.5 MG/1
12.5 TABLET, FILM COATED, EXTENDED RELEASE ORAL
Qty: 30 TABLET | Refills: 0 | Status: SHIPPED | OUTPATIENT
Start: 2019-10-22 | End: 2019-11-11 | Stop reason: ALTCHOICE

## 2019-10-24 ENCOUNTER — OFFICE VISIT (OUTPATIENT)
Dept: URGENT CARE | Facility: CLINIC | Age: 59
End: 2019-10-24
Payer: COMMERCIAL

## 2019-10-24 VITALS
SYSTOLIC BLOOD PRESSURE: 120 MMHG | DIASTOLIC BLOOD PRESSURE: 80 MMHG | HEIGHT: 66 IN | RESPIRATION RATE: 18 BRPM | WEIGHT: 149.2 LBS | OXYGEN SATURATION: 99 % | TEMPERATURE: 98.4 F | BODY MASS INDEX: 23.98 KG/M2 | HEART RATE: 80 BPM

## 2019-10-24 DIAGNOSIS — J01.90 ACUTE NON-RECURRENT SINUSITIS, UNSPECIFIED LOCATION: Primary | ICD-10-CM

## 2019-10-24 PROCEDURE — 99213 OFFICE O/P EST LOW 20 MIN: CPT | Performed by: PHYSICIAN ASSISTANT

## 2019-10-24 RX ORDER — LEVOCETIRIZINE DIHYDROCHLORIDE 5 MG/1
5 TABLET, FILM COATED ORAL DAILY PRN
COMMUNITY

## 2019-10-24 RX ORDER — TRIAMCINOLONE ACETONIDE 55 UG/1
SPRAY, METERED NASAL DAILY PRN
COMMUNITY
End: 2020-01-09 | Stop reason: SDUPTHER

## 2019-10-24 RX ORDER — CEFUROXIME AXETIL 500 MG/1
500 TABLET ORAL EVERY 12 HOURS SCHEDULED
Qty: 20 TABLET | Refills: 0 | Status: SHIPPED | OUTPATIENT
Start: 2019-10-24 | End: 2019-11-03

## 2019-10-24 NOTE — PROGRESS NOTES
[facility] [employer] [facility]        NAME: Rylan Schwab is a 61 y o  female  : 1960    MRN: [id number]  DATE: 2019  TIME: 5:04 PM    Assessment and Plan   Acute non-recurrent sinusitis, unspecified location [J01 90]  1  Acute non-recurrent sinusitis, unspecified location  cefuroxime (CEFTIN) 500 mg tablet         Patient Instructions     Discussed condition with pt  She has acute sinusitis which I will treat with Ceftin and rec hydration, rest, discussed OTC cold meds, and observation  Follow up with PCP in 3-5 days  Proceed to  ER if symptoms worsen  Chief Complaint     Chief Complaint   Patient presents with    Cold Like Symptoms     Started yesterday with nasal rhinorrhea, facial pain into teeth, L ear pain, sl  sore throat, hoarse voice, chills and nausea  Taking Tylenol (last at 12 noon), Mucinex D, Nasacort and Xyzal     Facial Pain         History of Present Illness       Pt presents with a few day history of sinus pain/pressure in the frontal and maxillary regions, dental pain, nasal congestion, PND, ST, minimal cough, ear pain  She has subjective fever and chills  Denies N/V/D  Has taken Mucinex D, Tylenol, Nasacort, Xyzal  Has allergies but no asthma  Does not smoke or vape  She is prone to sinus infections  Review of Systems   Review of Systems   Constitutional: Positive for chills and fever  HENT: Positive for congestion, dental problem, ear pain, postnasal drip, sinus pressure, sinus pain and sore throat  Respiratory: Positive for cough  Cardiovascular: Negative  Gastrointestinal: Negative  Genitourinary: Negative            Current Medications       Current Outpatient Medications:     buPROPion (WELLBUTRIN XL) 300 mg 24 hr tablet, TAKE 1 TABLET EVERY MORNING, Disp: 90 tablet, Rfl: 0    EPINEPHrine (ADRENALIN) 0 1 % nasal solution, 0 5 mL into each nostril as needed, Disp: , Rfl:     etanercept (ENBREL SURECLICK) 50 MG/ML injection, Inject 1 mL (50 mg total) under the skin once a week, Disp: 12 Syringe, Rfl: 3    folic acid (FOLVITE) 1 mg tablet, Take by mouth daily, Disp: , Rfl:     levocetirizine (XYZAL) 5 MG tablet, Take 5 mg by mouth daily as needed for allergies, Disp: , Rfl:     methotrexate 2 5 mg tablet, Take 8 tablets (20 mg total) by mouth once a week, Disp: 102 tablet, Rfl: 0    phentermine (ADIPEX-P) 37 5 MG tablet, Take 1 tablet (37 5 mg total) by mouth daily, Disp: 30 tablet, Rfl: 0    sertraline (ZOLOFT) 100 mg tablet, TAKE 1 AND 1/2 TABLETS     (=150MG) DAILY, Disp: 135 tablet, Rfl: 0    Triamcinolone Acetonide (NASACORT ALLERGY 24HR) 55 MCG/ACT AERO, into each nostril daily as needed, Disp: , Rfl:     zolpidem (AMBIEN CR) 12 5 MG CR tablet, Take 1 tablet (12 5 mg total) by mouth daily at bedtime as needed for sleep, Disp: 30 tablet, Rfl: 0    cefuroxime (CEFTIN) 500 mg tablet, Take 1 tablet (500 mg total) by mouth every 12 (twelve) hours for 10 days Take with food  , Disp: 20 tablet, Rfl: 0    Current Allergies     Allergies as of 10/24/2019 - Reviewed 10/24/2019   Allergen Reaction Noted    Plaquenil [hydroxychloroquine] Rash 04/02/2019            The following portions of the patient's history were reviewed and updated as appropriate: allergies, current medications, past family history, past medical history, past social history, past surgical history and problem list      Past Medical History:   Diagnosis Date    Allergic rhinitis     Anxiety     Depression     Osteoporosis     Rheumatoid arthritis ([facility] [address] )        Past Surgical History:   Procedure Laterality Date    AUGMENTATION MAMMAPLASTY Bilateral 2010    BREAST BIOPSY Right 2008    benign    KNEE SURGERY      SINUS SURGERY         Family History   Problem Relation Age of Onset    Hypertension Mother     Cancer Mother     Stroke Mother     Crohn's disease Mother     Hyperlipidemia Mother     Heart attack Mother    Manohar Moose Breast cancer Mother 79    Hypertension Father     Cancer Father     Stroke Father     Hyperlipidemia Father     Heart attack Father     Hyperlipidemia Sister     Hyperlipidemia Brother          Medications have been verified  Objective   /80 (BP Location: Right arm, Patient Position: Sitting, Cuff Size: Standard)   Pulse 80   Temp 98 4 °F (36 9 °C) (Tympanic)   Resp 18   Ht 5' 6" (1 676 m)   Wt 67 7 kg (149 lb 3 2 oz)   SpO2 99%   BMI 24 08 kg/m²        Physical Exam     Physical Exam   Constitutional: She is oriented to person, place, and time  She appears well-developed and well-nourished  No distress  HENT:   Right Ear: Hearing, tympanic membrane, external ear and ear canal normal    Left Ear: Hearing, tympanic membrane, external ear and ear canal normal    Nose: Mucosal edema (B/L boggy turbinates) present  Right sinus exhibits maxillary sinus tenderness  Left sinus exhibits maxillary sinus tenderness  Mouth/Throat: Mucous membranes are normal  Posterior oropharyngeal erythema (PND) present  No oropharyngeal exudate  Neck: Neck supple  Cardiovascular: Normal rate, regular rhythm and normal heart sounds  Pulmonary/Chest: Effort normal and breath sounds normal    Lymphadenopathy:     She has no cervical adenopathy  Neurological: She is alert and oriented to person, place, and time  Psychiatric: She has a normal mood and affect  Vitals reviewed

## 2019-10-24 NOTE — PATIENT INSTRUCTIONS

## 2019-10-31 LAB
ALBUMIN SERPL-MCNC: 4.6 G/DL (ref 3.5–5.5)
ALBUMIN/GLOB SERPL: 1.9 {RATIO} (ref 1.2–2.2)
ALP SERPL-CCNC: 99 IU/L (ref 39–117)
ALT SERPL-CCNC: 20 IU/L (ref 0–32)
AST SERPL-CCNC: 29 IU/L (ref 0–40)
BASOPHILS # BLD AUTO: 0 X10E3/UL (ref 0–0.2)
BASOPHILS NFR BLD AUTO: 0 %
BILIRUB SERPL-MCNC: 0.4 MG/DL (ref 0–1.2)
BUN SERPL-MCNC: 17 MG/DL (ref 6–24)
BUN/CREAT SERPL: 18 (ref 9–23)
CALCIUM SERPL-MCNC: 9.9 MG/DL (ref 8.7–10.2)
CHLORIDE SERPL-SCNC: 106 MMOL/L (ref 96–106)
CO2 SERPL-SCNC: 20 MMOL/L (ref 20–29)
CREAT SERPL-MCNC: 0.92 MG/DL (ref 0.57–1)
EOSINOPHIL # BLD AUTO: 0.1 X10E3/UL (ref 0–0.4)
EOSINOPHIL NFR BLD AUTO: 1 %
ERYTHROCYTE [DISTWIDTH] IN BLOOD BY AUTOMATED COUNT: 13.6 % (ref 12.3–15.4)
GLOBULIN SER-MCNC: 2.4 G/DL (ref 1.5–4.5)
GLUCOSE SERPL-MCNC: 108 MG/DL (ref 65–99)
HCT VFR BLD AUTO: 38.2 % (ref 34–46.6)
HGB BLD-MCNC: 13 G/DL (ref 11.1–15.9)
IMM GRANULOCYTES # BLD: 0 X10E3/UL (ref 0–0.1)
IMM GRANULOCYTES NFR BLD: 0 %
LYMPHOCYTES # BLD AUTO: 2.3 X10E3/UL (ref 0.7–3.1)
LYMPHOCYTES NFR BLD AUTO: 33 %
MCH RBC QN AUTO: 32.8 PG (ref 26.6–33)
MCHC RBC AUTO-ENTMCNC: 34 G/DL (ref 31.5–35.7)
MCV RBC AUTO: 97 FL (ref 79–97)
MONOCYTES # BLD AUTO: 0.6 X10E3/UL (ref 0.1–0.9)
MONOCYTES NFR BLD AUTO: 9 %
NEUTROPHILS # BLD AUTO: 3.8 X10E3/UL (ref 1.4–7)
NEUTROPHILS NFR BLD AUTO: 57 %
PLATELET # BLD AUTO: 297 X10E3/UL (ref 150–450)
POTASSIUM SERPL-SCNC: 3.9 MMOL/L (ref 3.5–5.2)
PROT SERPL-MCNC: 7 G/DL (ref 6–8.5)
RBC # BLD AUTO: 3.96 X10E6/UL (ref 3.77–5.28)
SL AMB EGFR AFRICAN AMERICAN: 79 ML/MIN/1.73
SL AMB EGFR NON AFRICAN AMERICAN: 68 ML/MIN/1.73
SODIUM SERPL-SCNC: 141 MMOL/L (ref 134–144)
WBC # BLD AUTO: 6.8 X10E3/UL (ref 3.4–10.8)

## 2019-11-09 DIAGNOSIS — M06.09 RHEUMATOID ARTHRITIS OF MULTIPLE SITES WITH NEGATIVE RHEUMATOID FACTOR (HCC): ICD-10-CM

## 2019-11-11 DIAGNOSIS — F51.01 PRIMARY INSOMNIA: ICD-10-CM

## 2019-11-11 RX ORDER — ESZOPICLONE 2 MG/1
2 TABLET, FILM COATED ORAL
Qty: 90 TABLET | Refills: 0 | Status: SHIPPED | OUTPATIENT
Start: 2019-11-11 | End: 2020-01-09 | Stop reason: ALTCHOICE

## 2019-11-22 DIAGNOSIS — E66.3 OVERWEIGHT (BMI 25.0-29.9): ICD-10-CM

## 2019-11-22 RX ORDER — PHENTERMINE HYDROCHLORIDE 37.5 MG/1
TABLET ORAL
Qty: 30 TABLET | Refills: 0 | Status: SHIPPED | OUTPATIENT
Start: 2019-11-22 | End: 2020-01-06

## 2019-12-10 ENCOUNTER — TELEPHONE (OUTPATIENT)
Dept: RHEUMATOLOGY | Facility: CLINIC | Age: 59
End: 2019-12-10

## 2019-12-10 DIAGNOSIS — M06.9 RHEUMATOID ARTHRITIS INVOLVING MULTIPLE SITES, UNSPECIFIED RHEUMATOID FACTOR PRESENCE: ICD-10-CM

## 2019-12-10 NOTE — TELEPHONE ENCOUNTER
Prior Auth approved from 12/11/2019 through 12/11/2021  Send through [facility] pharmacy please           Prior Auth Submitted through [facility] using [employer]

## 2019-12-16 ENCOUNTER — TELEPHONE (OUTPATIENT)
Dept: FAMILY MEDICINE CLINIC | Facility: CLINIC | Age: 59
End: 2019-12-16

## 2019-12-16 NOTE — TELEPHONE ENCOUNTER
----- Message from Cesar Shipley MD sent at 12/16/2019  8:29 AM EST -----  Regarding: FW: Prescription Question  Contact: [phone]  I need to see her as she is not better- I havent seen her for this issue yet  Please give her an appt for today    ----- Message -----  From: Wayne Godwin MA  Sent: 12/16/2019   8:25 AM EST  To: Cesar Shipley MD  Subject: FW: Prescription Question                            ----- Message -----  From: Hermelinda Song  Sent: 12/16/2019   8:21 AM EST  To: [facility]  Subject: Prescription Question                            I have been fighting the flu for weeks  It has now moved into my throat and lungs/chest  I have a severe headache and Tylenol is barely impacting it  I am truly miserable  I think I need a strong round of antibiotics to finally get rid of this cold  Could you please send in a script for me to [facility] in [place]? It has been years since I have had antibiotics, but I just can't seem to fight this    Hermelinda

## 2019-12-16 NOTE — TELEPHONE ENCOUNTER
Called and left message patient that Dr Hernan Turner would like to see her today for this issue since she has not seen her in order to prescribe an antibiotic

## 2020-01-05 DIAGNOSIS — E66.3 OVERWEIGHT (BMI 25.0-29.9): ICD-10-CM

## 2020-01-06 RX ORDER — PHENTERMINE HYDROCHLORIDE 37.5 MG/1
TABLET ORAL
Qty: 30 TABLET | Refills: 0 | Status: SHIPPED | OUTPATIENT
Start: 2020-01-06 | End: 2020-02-20

## 2020-01-09 ENCOUNTER — OFFICE VISIT (OUTPATIENT)
Dept: FAMILY MEDICINE CLINIC | Facility: CLINIC | Age: 60
End: 2020-01-09
Payer: COMMERCIAL

## 2020-01-09 VITALS
SYSTOLIC BLOOD PRESSURE: 120 MMHG | BODY MASS INDEX: 23.95 KG/M2 | HEIGHT: 66 IN | TEMPERATURE: 98.2 F | RESPIRATION RATE: 18 BRPM | WEIGHT: 149 LBS | HEART RATE: 73 BPM | OXYGEN SATURATION: 100 % | DIASTOLIC BLOOD PRESSURE: 82 MMHG

## 2020-01-09 DIAGNOSIS — F33.42 RECURRENT MAJOR DEPRESSIVE DISORDER, IN FULL REMISSION (HCC): ICD-10-CM

## 2020-01-09 DIAGNOSIS — J31.0 CHRONIC RHINITIS: ICD-10-CM

## 2020-01-09 DIAGNOSIS — E66.3 OVERWEIGHT: ICD-10-CM

## 2020-01-09 DIAGNOSIS — M06.09 RHEUMATOID ARTHRITIS OF MULTIPLE SITES WITH NEGATIVE RHEUMATOID FACTOR (HCC): Primary | ICD-10-CM

## 2020-01-09 DIAGNOSIS — F51.01 PRIMARY INSOMNIA: ICD-10-CM

## 2020-01-09 PROCEDURE — 3008F BODY MASS INDEX DOCD: CPT | Performed by: FAMILY MEDICINE

## 2020-01-09 PROCEDURE — 99214 OFFICE O/P EST MOD 30 MIN: CPT | Performed by: FAMILY MEDICINE

## 2020-01-09 RX ORDER — ZOLPIDEM TARTRATE 10 MG/1
10 TABLET ORAL
Qty: 30 TABLET | Refills: 0 | Status: SHIPPED | OUTPATIENT
Start: 2020-01-09 | End: 2020-01-15 | Stop reason: ALTCHOICE

## 2020-01-09 RX ORDER — SERTRALINE HYDROCHLORIDE 100 MG/1
150 TABLET, FILM COATED ORAL DAILY
Qty: 135 TABLET | Refills: 0 | Status: SHIPPED | OUTPATIENT
Start: 2020-01-09 | End: 2020-04-17 | Stop reason: SDUPTHER

## 2020-01-09 RX ORDER — BUPROPION HYDROCHLORIDE 300 MG/1
300 TABLET ORAL EVERY MORNING
Qty: 90 TABLET | Refills: 0 | Status: SHIPPED | OUTPATIENT
Start: 2020-01-09 | End: 2020-04-17 | Stop reason: SDUPTHER

## 2020-01-09 RX ORDER — TRIAMCINOLONE ACETONIDE 55 UG/1
SPRAY, METERED NASAL
Qty: 16.5 G | Refills: 3 | Status: SHIPPED | OUTPATIENT
Start: 2020-01-09

## 2020-01-09 NOTE — PROGRESS NOTES
Assessment/Plan:    No problem-specific Assessment & Plan notes found for this encounter  Diagnoses and all orders for this visit:    Rheumatoid arthritis of multiple sites with negative rheumatoid factor ([facility] [address] )  Comments:  doing well  continue current meds     Primary insomnia  -     zolpidem (AMBIEN) 10 mg tablet; Take 1 tablet (10 mg total) by mouth daily at bedtime as needed for sleep    Recurrent major depressive disorder, in full remission (HCC)  -     sertraline (ZOLOFT) 100 mg tablet; Take 1 5 tablets (150 mg total) by mouth daily  -     buPROPion (WELLBUTRIN XL) 300 mg 24 hr tablet; Take 1 tablet (300 mg total) by mouth every morning    Chronic rhinitis  -     Triamcinolone Acetonide (NASACORT ALLERGY 24HR) 55 MCG/ACT AERO; 2 sprays each nostril daily    Overweight  Comments:  doing well  continue current meds           Subjective:      Patient ID: Jose Blum is a 61 y o  female  Here to follow up  Staying the same weight   Exercising /walking 4-5 days a week   Enbrel is helping with her joint   Still having trouble staying asleep  Tried Lunesta didn't help  Still having sinus issues     Anxiety   Presents for follow-up visit  Patient reports no chest pain, compulsions, confusion, decreased concentration, excessive worry, feeling of choking, hyperventilation, impotence, insomnia, irritability, nervous/anxious behavior or obsessions  Symptoms occur occasionally  The quality of sleep is good  Nighttime awakenings: occasional      Compliance with medications is %  The following portions of the patient's history were reviewed and updated as appropriate: allergies, current medications, past family history, past medical history, past social history, past surgical history and problem list     Review of Systems   Constitutional: Negative for irritability  Cardiovascular: Negative for chest pain  Genitourinary: Negative for impotence     Psychiatric/Behavioral: Negative for confusion and decreased concentration  The patient is not nervous/anxious and does not have insomnia  Objective:      /82 (BP Location: Left arm, Patient Position: Sitting, Cuff Size: Standard)   Pulse 73   Temp 98 2 °F (36 8 °C) (Tympanic)   Resp 18   Ht 5' 6" (1 676 m)   Wt 67 6 kg (149 lb)   SpO2 100%   BMI 24 05 kg/m²          Physical Exam   Constitutional: She appears well-developed and well-nourished  HENT:   Mouth/Throat: No oropharyngeal exudate  Eyes: Pupils are equal, round, and reactive to light  EOM are normal  Right eye exhibits no discharge  Left eye exhibits no discharge  Neck: No tracheal deviation present  No thyromegaly present  Cardiovascular: Normal rate and regular rhythm  Exam reveals no friction rub  No murmur heard  Pulmonary/Chest: Effort normal and breath sounds normal    Abdominal: She exhibits no distension  There is no tenderness  There is no guarding  Neurological: No cranial nerve deficit  Coordination normal    Psychiatric: She has a normal mood and affect   Her behavior is normal

## 2020-01-10 ENCOUNTER — OFFICE VISIT (OUTPATIENT)
Dept: RHEUMATOLOGY | Facility: CLINIC | Age: 60
End: 2020-01-10
Payer: COMMERCIAL

## 2020-01-10 ENCOUNTER — TELEPHONE (OUTPATIENT)
Dept: PAIN MEDICINE | Facility: CLINIC | Age: 60
End: 2020-01-10

## 2020-01-10 VITALS
SYSTOLIC BLOOD PRESSURE: 104 MMHG | DIASTOLIC BLOOD PRESSURE: 72 MMHG | BODY MASS INDEX: 24.05 KG/M2 | WEIGHT: 149 LBS | HEART RATE: 81 BPM

## 2020-01-10 DIAGNOSIS — Z79.899 LONG-TERM USE OF IMMUNOSUPPRESSANT MEDICATION: ICD-10-CM

## 2020-01-10 DIAGNOSIS — M06.00 SERONEGATIVE RHEUMATOID ARTHRITIS (HCC): Primary | ICD-10-CM

## 2020-01-10 DIAGNOSIS — M06.09 RHEUMATOID ARTHRITIS OF MULTIPLE SITES WITH NEGATIVE RHEUMATOID FACTOR (HCC): ICD-10-CM

## 2020-01-10 DIAGNOSIS — M85.80 OSTEOPENIA, UNSPECIFIED LOCATION: ICD-10-CM

## 2020-01-10 PROCEDURE — 99214 OFFICE O/P EST MOD 30 MIN: CPT | Performed by: INTERNAL MEDICINE

## 2020-01-10 NOTE — PROGRESS NOTES
Assessment and Plan:   Ms Melanie Alegre a 31-year-old female with history significant for rheumatoid arthritis, osteopenia and lumbar degenerative arthritis, who presents for follow-up   She is currently on subcutaneous Enbrel 50 mg weekly and methotrexate 7 tablets weekly        # Seronegative rheumatoid arthritis  - Hermelinda presents today for follow-up of rheumatoid arthritis, and has been well controlled on a regimen of subcutaneous Enbrel 50 mg weekly and oral methotrexate 7 tablets weekly with folic acid 1 mg daily   She does not report significant complaints in regards to ongoing arthritic symptoms, and there is no evidence of synovitis noted on her physical examination today  In view of this I discussed initiating a slow taper with the methotrexate, and would like her to decrease to 6 tablets weekly  She will continue the Enbrel 50 mg weekly  I advised her to let me know if there is any recurrence of her arthritic complaints with the methotrexate dose decrease  - High risk medication lab monitoring will be repeated today      # Lumbar DJD  - Continue Tylenol as needed      # Osteopenia  - She thinks her last DEXA scan may have been more than 2 years ago  I advised her we will schedule her for an updated exam to monitor for any progression from the osteopenia  In the meanwhile she will continue calcium and vitamin-D supplements daily  Plan:  Diagnoses and all orders for this visit:    Seronegative rheumatoid arthritis ([address] )    Long-term use of immunosuppressant medication  -     CBC and differential; Future  -     Comprehensive metabolic panel; Future    Osteopenia, unspecified location    Rheumatoid arthritis of multiple sites with negative rheumatoid factor (HCC)  -     methotrexate 2 5 mg tablet; Take 6 tablets (15 mg total) by mouth once a week      Activities as tolerated    Diet: low carb/low fat, more greens/vegetables, adequate hydration    Exercise: try to maintain a low impact exercise regimen as much as possible  Walk for 30 minutes a day for at least 3 days a week    Encouraged to maintain good sleep hygiene  Continue other medications as prescribed by PCP and other specialists        RTC in 4 months          HPI    INITIAL VISIT NOTE:  Ms Kiana Catalan a 51-year-old female with history significant for rheumatoid arthritis, osteopenia and lumbar degenerative arthritis, who presents for further management of rheumatoid arthritis and establishing with [facility]  Kajal Franciscoshawn is currently on subcutaneous Enbrel 50 mg weekly and methotrexate 8 tablets weekly  Kajal Hammer is referred by Dr Eve Boyd     Patient reports she was diagnosed with rheumatoid arthritis at the age of 27 when she initially presented with left knee pain and swelling   She states since then it has gradually progressed to involve multiple joints   She has tried multiple medications including hydroxychloroquine which caused a skin rash, gold injections, sulfasalazine and possibly other injectables although she cannot recall the names  Kajal Franciscoshawn has been well maintained on a combination of subcutaneous Enbrel 50 mg weekly and methotrexate 8 tablets weekly with folic acid 1 mg daily for the past 18-20 years  Kajal Hammer has not required to change her medications   She states overall her joint pains have been well controlled although on occasion she will notice an achiness sensation involving her hands, wrists and elbows   Intermittently she will notice swelling affecting her hands and feet   She also experiences morning stiffness primarily involving her hands and feet, and states the feet stiffness resolves right away but the hand stiffness persists for less than 30 minutes   She is currently not taking any steroids or over-the-counter pain medications   She refrains from NSAID use due to a history of esophagitis  Kajal Hammer mentions previously she had been on chronic courses of steroids but has not required this in the past few years   Within the last year she has been dealing with increasing pain affecting her lower back radiating into her bilateral legs associated with a sensation of weakness  Angella Quach was seen by Orthopedics in [place] and apparently had imaging done which showed osteoarthritic changes affecting her lumbar spine   She did complete a course of physical therapy prior to relocating to this area  Angella Quach is not interested in seeing the [facility] at this time  Angella Quach manages her pain with Tylenol as needed      Overall she has been tolerating the Enbrel and methotrexate well without any concerns for side effects   She denies fevers, chills, unintentional weight loss, mouth ulcers, skin rash or GI issues   No other complaints noted at this time         9/9/2019:  Patient presents for a follow-up today  She is currently on subcutaneous Enbrel 50 mg weekly and oral methotrexate 8 tablets weekly with folic acid 1 mg daily      We reviewed her labs done following the prior office visit which revealed an unremarkable CBC, CMP, ESR, CRP, chronic hepatitis panel, QuantiFERON TB gold, vitamin-D, rheumatoid factor and anti CCP antibody  X-rays of her bilateral hands and feet did not show any inflammatory changes, but showed very minimal degenerative changes      She reports overall being stable on her current regimen of the Enbrel and methotrexate  She has not had any flare-ups of joint pain or swelling  Only on occasion will she experience mild pain around her hips and knees  She does experience morning stiffness which affects her diffusely, but lasts only a few minutes  She has not had to take any over-the-counter pain medications for her joint pains  She has been tolerating both medications well without any concerns for side effects or recurrent infections    When she does experience an achy sensation in her bilateral legs she does take Tylenol if needed      She is unsure when her last bone density exam was, but thinks it may have been more than 2 years ago  She does take calcium and vitamin-D supplements daily for a history of osteopenia      She denies any other complaints at this time        1/10/2020:  Patient presents for follow-up of seronegative rheumatoid arthritis  She is currently on oral methotrexate at 7 tablets once weekly with folic acid 1 mg daily, and Enbrel 50 mg once weekly  We reviewed her labs done following the prior office visit which showed an unremarkable CBC and CMP  She reports overall with decreasing her methotrexate from 8-7 tablets weekly there was no flare-up noted in her joint pains  She is currently denying any joint pain or swelling  She experiences 1 minutes of morning stiffness affecting her hands and feet  She has not had to take any over-the-counter pain medications  She has overall been tolerating the methotrexate and Enbrel well without any concerns for side effects or infections  She is yet to schedule the DEXA scan  She does take calcium and vitamin-D supplements daily  The following portions of the patient's history were reviewed and updated as appropriate: allergies, current medications, past family history, past medical history, past social history, past surgical history and problem list       Review of Systems  Constitutional: Negative for weight change, fevers, chills, night sweats, fatigue  ENT/Mouth: Negative for hearing changes, ear pain, sinus pain, hoarseness, sore throat, rhinorrhea, swallowing difficulty  Positive for nasal congestion  Eyes: Negative for pain, redness, discharge, vision changes  Cardiovascular: Negative for chest pain, SOB, palpitations  Respiratory: Negative for sputum, wheezing, dyspnea  Positive for cough  Gastrointestinal: Negative for nausea, vomiting, diarrhea, constipation, pain, heartburn  Genitourinary: Negative for dysuria, urinary frequency, hematuria  Musculoskeletal: As per HPI  Skin: Negative for skin rash, color changes     Neuro: Negative for weakness, numbness, tingling, loss of consciousness  Psych: Negative for anxiety, depression  Heme/Lymph: Negative for easy bruising, bleeding, lymphadenopathy  Past Medical History:   Diagnosis Date    Allergic rhinitis     Anxiety     Depression     Osteoporosis     Rheumatoid arthritis ([facility] [address] )        Past Surgical History:   Procedure Laterality Date    AUGMENTATION MAMMAPLASTY Bilateral 2010    BREAST BIOPSY Right 2008    benign    KNEE SURGERY      SINUS SURGERY         Social History     Socioeconomic History    Marital status: /Civil Union     Spouse name: Not on file    Number of children: Not on file    Years of education: Not on file    Highest education level: Not on file   Occupational History    Not on file   Social Needs    Financial resource strain: Not on file    Food insecurity:     Worry: Not on file     Inability: Not on file    Transportation needs:     Medical: Not on file     Non-medical: Not on file   Tobacco Use    Smoking status: Never Smoker    Smokeless tobacco: Never Used   Substance and Sexual Activity    Alcohol use:  Yes     Alcohol/week: 2 0 standard drinks     Types: 2 Glasses of wine per week     Frequency: Monthly or less     Drinks per session: 1 or 2     Binge frequency: Never    Drug use: Not Currently    Sexual activity: Not on file   Lifestyle    Physical activity:     Days per week: Not on file     Minutes per session: Not on file    Stress: Not on file   Relationships    Social connections:     Talks on phone: Not on file     Gets together: Not on file     Attends Buddhism service: Not on file     Active member of club or organization: Not on file     Attends meetings of clubs or organizations: Not on file     Relationship status: Not on file    Intimate partner violence:     Fear of current or ex partner: Not on file     Emotionally abused: Not on file     Physically abused: Not on file     Forced sexual activity: Not on file   Other Topics Concern    Not on file   Social History Narrative    Not on file       Family History   Problem Relation Age of Onset    Hypertension Mother     Cancer Mother     Stroke Mother     Crohn's disease Mother     Hyperlipidemia Mother     Heart attack Mother    Norm Cristina Breast cancer Mother 79    Hypertension Father     Cancer Father     Stroke Father     Hyperlipidemia Father     Heart attack Father     Hyperlipidemia Sister     Hyperlipidemia Brother        Allergies   Allergen Reactions    Plaquenil [Hydroxychloroquine] Rash       Current Outpatient Medications:     buPROPion (WELLBUTRIN XL) 300 mg 24 hr tablet, Take 1 tablet (300 mg total) by mouth every morning, Disp: 90 tablet, Rfl: 0    EPINEPHrine (ADRENALIN) 0 1 % nasal solution, 0 5 mL into each nostril as needed, Disp: , Rfl:     etanercept (ENBREL SURECLICK) 50 MG/ML injection, Inject 1 mL (50 mg total) under the skin once a week, Disp: 12 Syringe, Rfl: 3    folic acid (FOLVITE) 1 mg tablet, Take by mouth daily, Disp: , Rfl:     levocetirizine (XYZAL) 5 MG tablet, Take 5 mg by mouth daily as needed for allergies, Disp: , Rfl:     methotrexate 2 5 mg tablet, Take 6 tablets (15 mg total) by mouth once a week, Disp: 72 tablet, Rfl: 0    phentermine (ADIPEX-P) 37 5 MG tablet, TAKE 1 TABLET BY MOUTH EVERY DAY, Disp: 30 tablet, Rfl: 0    sertraline (ZOLOFT) 100 mg tablet, Take 1 5 tablets (150 mg total) by mouth daily, Disp: 135 tablet, Rfl: 0    Triamcinolone Acetonide (NASACORT ALLERGY 24HR) 55 MCG/ACT AERO, 2 sprays each nostril daily, Disp: 16 5 g, Rfl: 3    zolpidem (AMBIEN) 10 mg tablet, Take 1 tablet (10 mg total) by mouth daily at bedtime as needed for sleep, Disp: 30 tablet, Rfl: 0      Objective:    Vitals:    01/10/20 0930   BP: 104/72   BP Location: Left arm   Pulse: 81   Weight: 67 6 kg (149 lb)       Physical Exam  General: Well appearing, well nourished, in no distress   Oriented x 3, normal mood and affect  Ambulating without difficulty  Skin: Good turgor, no rash, unusual bruising or prominent lesions  Hair: Normal texture and distribution  Nails: Normal color, no deformities  HEENT:  Head: Normocephalic, atraumatic  Eyes: Conjunctiva clear, sclera non-icteric, EOM intact  Nose: No external lesions, mucosa non-inflamed  Mouth: Mucous membranes moist, no mucosal lesions  Extremities: No amputations or deformities, cyanosis, edema  Musculoskeletal:  There is no peripheral joint soft tissue swelling or tenderness noted  She has good range of motion in all joints  Neurologic: Alert and oriented  No focal neurological deficits appreciated  Psychiatric: Normal mood and affect  FELIPE Campo    Rheumatology

## 2020-01-15 DIAGNOSIS — F51.01 PRIMARY INSOMNIA: ICD-10-CM

## 2020-01-15 DIAGNOSIS — J01.01 ACUTE RECURRENT MAXILLARY SINUSITIS: Primary | ICD-10-CM

## 2020-01-15 RX ORDER — AMOXICILLIN 875 MG/1
875 TABLET, COATED ORAL 2 TIMES DAILY
Qty: 14 TABLET | Refills: 0 | Status: SHIPPED | OUTPATIENT
Start: 2020-01-15 | End: 2020-01-22

## 2020-01-21 LAB
ALBUMIN SERPL-MCNC: 4.5 G/DL (ref 3.8–4.9)
ALBUMIN/GLOB SERPL: 2.1 {RATIO} (ref 1.2–2.2)
ALP SERPL-CCNC: 95 IU/L (ref 39–117)
ALT SERPL-CCNC: 17 IU/L (ref 0–32)
AST SERPL-CCNC: 23 IU/L (ref 0–40)
BASOPHILS # BLD AUTO: 0 X10E3/UL (ref 0–0.2)
BASOPHILS NFR BLD AUTO: 0 %
BILIRUB SERPL-MCNC: 0.4 MG/DL (ref 0–1.2)
BUN SERPL-MCNC: 17 MG/DL (ref 6–24)
BUN/CREAT SERPL: 18 (ref 9–23)
CALCIUM SERPL-MCNC: 9.6 MG/DL (ref 8.7–10.2)
CHLORIDE SERPL-SCNC: 108 MMOL/L (ref 96–106)
CO2 SERPL-SCNC: 18 MMOL/L (ref 20–29)
CREAT SERPL-MCNC: 0.93 MG/DL (ref 0.57–1)
EOSINOPHIL # BLD AUTO: 0.1 X10E3/UL (ref 0–0.4)
EOSINOPHIL NFR BLD AUTO: 1 %
ERYTHROCYTE [DISTWIDTH] IN BLOOD BY AUTOMATED COUNT: 14 % (ref 11.7–15.4)
GLOBULIN SER-MCNC: 2.1 G/DL (ref 1.5–4.5)
GLUCOSE SERPL-MCNC: 107 MG/DL (ref 65–99)
HCT VFR BLD AUTO: 34.6 % (ref 34–46.6)
HGB BLD-MCNC: 12 G/DL (ref 11.1–15.9)
IMM GRANULOCYTES # BLD: 0 X10E3/UL (ref 0–0.1)
IMM GRANULOCYTES NFR BLD: 0 %
LYMPHOCYTES # BLD AUTO: 1.8 X10E3/UL (ref 0.7–3.1)
LYMPHOCYTES NFR BLD AUTO: 32 %
MCH RBC QN AUTO: 33 PG (ref 26.6–33)
MCHC RBC AUTO-ENTMCNC: 34.7 G/DL (ref 31.5–35.7)
MCV RBC AUTO: 95 FL (ref 79–97)
MONOCYTES # BLD AUTO: 0.5 X10E3/UL (ref 0.1–0.9)
MONOCYTES NFR BLD AUTO: 8 %
NEUTROPHILS # BLD AUTO: 3.1 X10E3/UL (ref 1.4–7)
NEUTROPHILS NFR BLD AUTO: 59 %
PLATELET # BLD AUTO: 331 X10E3/UL (ref 150–450)
POTASSIUM SERPL-SCNC: 4.2 MMOL/L (ref 3.5–5.2)
PROT SERPL-MCNC: 6.6 G/DL (ref 6–8.5)
RBC # BLD AUTO: 3.64 X10E6/UL (ref 3.77–5.28)
SL AMB EGFR AFRICAN AMERICAN: 78 ML/MIN/1.73
SL AMB EGFR NON AFRICAN AMERICAN: 67 ML/MIN/1.73
SODIUM SERPL-SCNC: 142 MMOL/L (ref 134–144)
WBC # BLD AUTO: 5.4 X10E3/UL (ref 3.4–10.8)

## 2020-02-11 ENCOUNTER — HOSPITAL ENCOUNTER (OUTPATIENT)
Dept: RADIOLOGY | Facility: HOSPITAL | Age: 60
Discharge: HOME | End: 2020-02-11
Attending: INTERNAL MEDICINE
Payer: COMMERCIAL

## 2020-02-11 DIAGNOSIS — M85.80 OSTEOPENIA, UNSPECIFIED LOCATION: ICD-10-CM

## 2020-02-11 PROCEDURE — 77080 DXA BONE DENSITY AXIAL: CPT

## 2020-02-17 DIAGNOSIS — F51.01 PRIMARY INSOMNIA: ICD-10-CM

## 2020-02-17 RX ORDER — SUVOREXANT 10 MG/1
TABLET, FILM COATED ORAL
Qty: 30 TABLET | Refills: 0 | Status: SHIPPED | OUTPATIENT
Start: 2020-02-17 | End: 2020-04-06

## 2020-02-20 DIAGNOSIS — E66.3 OVERWEIGHT (BMI 25.0-29.9): ICD-10-CM

## 2020-02-20 RX ORDER — PHENTERMINE HYDROCHLORIDE 37.5 MG/1
TABLET ORAL
Qty: 30 TABLET | Refills: 0 | Status: SHIPPED | OUTPATIENT
Start: 2020-02-20 | End: 2020-04-06

## 2020-04-03 DIAGNOSIS — F51.01 PRIMARY INSOMNIA: ICD-10-CM

## 2020-04-03 DIAGNOSIS — E66.3 OVERWEIGHT (BMI 25.0-29.9): ICD-10-CM

## 2020-04-06 RX ORDER — SUVOREXANT 10 MG/1
TABLET, FILM COATED ORAL
Qty: 30 TABLET | Refills: 0 | Status: SHIPPED | OUTPATIENT
Start: 2020-04-06 | End: 2020-05-18 | Stop reason: ALTCHOICE

## 2020-04-06 RX ORDER — PHENTERMINE HYDROCHLORIDE 37.5 MG/1
TABLET ORAL
Qty: 30 TABLET | Refills: 0 | Status: SHIPPED | OUTPATIENT
Start: 2020-04-06 | End: 2020-06-11

## 2020-04-17 DIAGNOSIS — F33.42 RECURRENT MAJOR DEPRESSIVE DISORDER, IN FULL REMISSION (HCC): ICD-10-CM

## 2020-04-17 RX ORDER — BUPROPION HYDROCHLORIDE 300 MG/1
300 TABLET ORAL EVERY MORNING
Qty: 90 TABLET | Refills: 3 | Status: SHIPPED | OUTPATIENT
Start: 2020-04-17 | End: 2021-01-22

## 2020-04-17 RX ORDER — SERTRALINE HYDROCHLORIDE 100 MG/1
150 TABLET, FILM COATED ORAL DAILY
Qty: 135 TABLET | Refills: 3 | Status: SHIPPED | OUTPATIENT
Start: 2020-04-17 | End: 2021-01-22

## 2020-04-17 RX ORDER — BUPROPION HYDROCHLORIDE 300 MG/1
TABLET ORAL
Qty: 90 TABLET | Refills: 0 | OUTPATIENT
Start: 2020-04-17

## 2020-04-17 RX ORDER — SERTRALINE HYDROCHLORIDE 100 MG/1
TABLET, FILM COATED ORAL
Qty: 135 TABLET | Refills: 0 | OUTPATIENT
Start: 2020-04-17

## 2020-04-22 DIAGNOSIS — M25.522 LEFT ELBOW PAIN: Primary | ICD-10-CM

## 2020-05-04 DIAGNOSIS — F51.01 PRIMARY INSOMNIA: Primary | ICD-10-CM

## 2020-05-04 RX ORDER — ZOLPIDEM TARTRATE 10 MG/1
10 TABLET ORAL
Qty: 30 TABLET | Refills: 0 | Status: SHIPPED | OUTPATIENT
Start: 2020-05-04 | End: 2020-06-04

## 2020-05-18 ENCOUNTER — OFFICE VISIT (OUTPATIENT)
Dept: FAMILY MEDICINE CLINIC | Facility: CLINIC | Age: 60
End: 2020-05-18
Payer: COMMERCIAL

## 2020-05-18 ENCOUNTER — TELEPHONE (OUTPATIENT)
Dept: RHEUMATOLOGY | Facility: CLINIC | Age: 60
End: 2020-05-18

## 2020-05-18 ENCOUNTER — TELEMEDICINE (OUTPATIENT)
Dept: RHEUMATOLOGY | Facility: CLINIC | Age: 60
End: 2020-05-18
Payer: COMMERCIAL

## 2020-05-18 VITALS
HEIGHT: 66 IN | DIASTOLIC BLOOD PRESSURE: 84 MMHG | BODY MASS INDEX: 24.14 KG/M2 | RESPIRATION RATE: 18 BRPM | SYSTOLIC BLOOD PRESSURE: 140 MMHG | OXYGEN SATURATION: 99 % | HEART RATE: 95 BPM | TEMPERATURE: 98.2 F | WEIGHT: 150.2 LBS

## 2020-05-18 DIAGNOSIS — Z00.00 ANNUAL PHYSICAL EXAM: Primary | ICD-10-CM

## 2020-05-18 DIAGNOSIS — M25.522 LEFT ELBOW PAIN: ICD-10-CM

## 2020-05-18 DIAGNOSIS — Z79.899 METHOTREXATE, LONG TERM, CURRENT USE: ICD-10-CM

## 2020-05-18 DIAGNOSIS — M85.80 OSTEOPENIA, UNSPECIFIED LOCATION: ICD-10-CM

## 2020-05-18 DIAGNOSIS — Z11.4 ENCOUNTER FOR SCREENING FOR HIV: ICD-10-CM

## 2020-05-18 DIAGNOSIS — M06.00 SERONEGATIVE RHEUMATOID ARTHRITIS (HCC): Primary | ICD-10-CM

## 2020-05-18 DIAGNOSIS — Z91.89 AT HIGH RISK FOR FRACTURE: ICD-10-CM

## 2020-05-18 DIAGNOSIS — Z12.31 VISIT FOR SCREENING MAMMOGRAM: ICD-10-CM

## 2020-05-18 DIAGNOSIS — Z79.899 LONG-TERM USE OF IMMUNOSUPPRESSANT MEDICATION: ICD-10-CM

## 2020-05-18 PROCEDURE — 99213 OFFICE O/P EST LOW 20 MIN: CPT | Performed by: INTERNAL MEDICINE

## 2020-05-18 PROCEDURE — 99396 PREV VISIT EST AGE 40-64: CPT | Performed by: FAMILY MEDICINE

## 2020-06-03 DIAGNOSIS — F51.01 PRIMARY INSOMNIA: ICD-10-CM

## 2020-06-04 RX ORDER — ZOLPIDEM TARTRATE 10 MG/1
TABLET ORAL
Qty: 30 TABLET | Refills: 0 | Status: SHIPPED | OUTPATIENT
Start: 2020-06-04 | End: 2020-07-07

## 2020-06-11 DIAGNOSIS — E66.3 OVERWEIGHT: ICD-10-CM

## 2020-06-11 RX ORDER — PHENTERMINE HYDROCHLORIDE 37.5 MG/1
TABLET ORAL
Qty: 30 TABLET | Refills: 0 | Status: SHIPPED | OUTPATIENT
Start: 2020-06-11 | End: 2020-07-29 | Stop reason: SDUPTHER

## 2020-07-06 DIAGNOSIS — F51.01 PRIMARY INSOMNIA: ICD-10-CM

## 2020-07-07 RX ORDER — ZOLPIDEM TARTRATE 10 MG/1
TABLET ORAL
Qty: 30 TABLET | Refills: 0 | Status: SHIPPED | OUTPATIENT
Start: 2020-07-07 | End: 2020-07-29 | Stop reason: SDUPTHER

## 2020-07-07 NOTE — TELEPHONE ENCOUNTER
Medication:  PDMP   Active agreement on file -No      Nothing listed in [place] PMED, fills in [place]? 27-Mar-2018 05:10

## 2020-07-23 ENCOUNTER — ANNUAL EXAM (OUTPATIENT)
Dept: FAMILY MEDICINE CLINIC | Facility: CLINIC | Age: 60
End: 2020-07-23
Payer: COMMERCIAL

## 2020-07-23 VITALS
WEIGHT: 152.3 LBS | HEART RATE: 80 BPM | BODY MASS INDEX: 25.37 KG/M2 | DIASTOLIC BLOOD PRESSURE: 78 MMHG | RESPIRATION RATE: 12 BRPM | TEMPERATURE: 99 F | SYSTOLIC BLOOD PRESSURE: 118 MMHG | HEIGHT: 65 IN | OXYGEN SATURATION: 98 %

## 2020-07-23 DIAGNOSIS — Z12.4 PAP SMEAR FOR CERVICAL CANCER SCREENING: Primary | ICD-10-CM

## 2020-07-23 PROCEDURE — 87624 HPV HI-RISK TYP POOLED RSLT: CPT | Performed by: FAMILY MEDICINE

## 2020-07-23 PROCEDURE — G0145 SCR C/V CYTO,THINLAYER,RESCR: HCPCS | Performed by: FAMILY MEDICINE

## 2020-07-23 PROCEDURE — S0612 ANNUAL GYNECOLOGICAL EXAMINA: HCPCS | Performed by: FAMILY MEDICINE

## 2020-07-23 NOTE — PROGRESS NOTES
Subjective     Hermelinda Song is a 61 y o  woman who comes in today for a  pap smear only  Her most recent annual exam was on 5/2020   Her most recent Pap smear was on  and showed no abnormalities  Previous abnormal Pap smears: no  Contraception: none    The following portions of the patient's history were reviewed and updated as appropriate: allergies, current medications, past family history, past medical history, past social history, past surgical history and problem list     Review of Systems  Constitutional: negative  Eyes: negative  Ears, nose, mouth, throat, and face: negative  Respiratory: negative  Cardiovascular: negative  Gastrointestinal: negative  Genitourinary:negative  Integument/breast: negative  Hematologic/lymphatic: negative  Musculoskeletal:negative  Neurological: negative  Behavioral/Psych: negative  Endocrine: negative  Allergic/Immunologic: negative  Objective     /78   Pulse 80   Temp 99 °F (37 2 °C) (Tympanic)   Resp 12   Ht 5' 5 47" (1 663 m)   Wt 69 1 kg (152 lb 4 8 oz)   SpO2 98%   BMI 24 98 kg/m²   Pelvic Exam: external genitalia normal, vagina normal without discharge and cervix normal in appearance  Pap smear obtained  Physical Exam   Constitutional: She is oriented to person, place, and time  She appears well-developed  Neck: Normal range of motion  Neck supple  Cardiovascular: Normal rate and regular rhythm  Pulmonary/Chest: Effort normal and breath sounds normal    Abdominal: She exhibits no distension  There is no tenderness  Neurological: She is alert and oriented to person, place, and time  Skin: Skin is warm  No erythema  No pallor  Psychiatric: She has a normal mood and affect  Her behavior is normal    + breast implants both breasts  Assessment/Plan   Hermelinda was seen today for gynecologic exam     Diagnoses and all orders for this visit:    Pap smear for cervical cancer screening  -     Liquid-based pap, screening    BMI Counseling:  Body mass index is 24 98 kg/m²  The BMI is above normal  Nutrition recommendations include decreasing portion sizes and encouraging healthy choices of fruits and vegetables  Exercise recommendations include moderate physical activity 150 minutes/week  No pharmacotherapy was ordered  Screening pap smear  Follow up in 1 year, or as indicated by Pap results

## 2020-07-25 LAB
HPV HR 12 DNA CVX QL NAA+PROBE: NEGATIVE
HPV16 DNA CVX QL NAA+PROBE: NEGATIVE
HPV18 DNA CVX QL NAA+PROBE: NEGATIVE

## 2020-07-28 LAB
ALBUMIN SERPL-MCNC: 4.3 G/DL (ref 3.8–4.9)
ALBUMIN/GLOB SERPL: 1.8 {RATIO} (ref 1.2–2.2)
ALP SERPL-CCNC: 97 IU/L (ref 39–117)
ALT SERPL-CCNC: 16 IU/L (ref 0–32)
AST SERPL-CCNC: 26 IU/L (ref 0–40)
BASOPHILS # BLD AUTO: 0 X10E3/UL (ref 0–0.2)
BASOPHILS NFR BLD AUTO: 0 %
BILIRUB SERPL-MCNC: 0.4 MG/DL (ref 0–1.2)
BUN SERPL-MCNC: 14 MG/DL (ref 8–27)
BUN/CREAT SERPL: 15 (ref 12–28)
CALCIUM SERPL-MCNC: 9.4 MG/DL (ref 8.7–10.3)
CHLORIDE SERPL-SCNC: 105 MMOL/L (ref 96–106)
CHOLEST SERPL-MCNC: 218 MG/DL (ref 100–199)
CO2 SERPL-SCNC: 20 MMOL/L (ref 20–29)
CREAT SERPL-MCNC: 0.91 MG/DL (ref 0.57–1)
EOSINOPHIL # BLD AUTO: 0 X10E3/UL (ref 0–0.4)
EOSINOPHIL NFR BLD AUTO: 1 %
ERYTHROCYTE [DISTWIDTH] IN BLOOD BY AUTOMATED COUNT: 12.1 % (ref 11.7–15.4)
GLOBULIN SER-MCNC: 2.4 G/DL (ref 1.5–4.5)
GLUCOSE SERPL-MCNC: 89 MG/DL (ref 65–99)
HCT VFR BLD AUTO: 36.7 % (ref 34–46.6)
HDLC SERPL-MCNC: 72 MG/DL
HGB BLD-MCNC: 12.2 G/DL (ref 11.1–15.9)
IMM GRANULOCYTES # BLD: 0 X10E3/UL (ref 0–0.1)
IMM GRANULOCYTES NFR BLD: 0 %
LDLC SERPL CALC-MCNC: 129 MG/DL (ref 0–99)
LDLC/HDLC SERPL: 1.8 RATIO (ref 0–3.2)
LYMPHOCYTES # BLD AUTO: 1.7 X10E3/UL (ref 0.7–3.1)
LYMPHOCYTES NFR BLD AUTO: 35 %
MCH RBC QN AUTO: 31.7 PG (ref 26.6–33)
MCHC RBC AUTO-ENTMCNC: 33.2 G/DL (ref 31.5–35.7)
MCV RBC AUTO: 95 FL (ref 79–97)
MONOCYTES # BLD AUTO: 0.4 X10E3/UL (ref 0.1–0.9)
MONOCYTES NFR BLD AUTO: 8 %
NEUTROPHILS # BLD AUTO: 2.7 X10E3/UL (ref 1.4–7)
NEUTROPHILS NFR BLD AUTO: 56 %
PLATELET # BLD AUTO: 278 X10E3/UL (ref 150–450)
POTASSIUM SERPL-SCNC: 4.2 MMOL/L (ref 3.5–5.2)
PROT SERPL-MCNC: 6.7 G/DL (ref 6–8.5)
RBC # BLD AUTO: 3.85 X10E6/UL (ref 3.77–5.28)
SL AMB EGFR AFRICAN AMERICAN: 79 ML/MIN/1.73
SL AMB EGFR NON AFRICAN AMERICAN: 69 ML/MIN/1.73
SL AMB VLDL CHOLESTEROL CALC: 17 MG/DL (ref 5–40)
SODIUM SERPL-SCNC: 139 MMOL/L (ref 134–144)
TRIGL SERPL-MCNC: 84 MG/DL (ref 0–149)
WBC # BLD AUTO: 4.9 X10E3/UL (ref 3.4–10.8)

## 2020-07-29 ENCOUNTER — APPOINTMENT (OUTPATIENT)
Dept: RADIOLOGY | Age: 60
End: 2020-07-29
Payer: COMMERCIAL

## 2020-07-29 ENCOUNTER — OFFICE VISIT (OUTPATIENT)
Dept: URGENT CARE | Age: 60
End: 2020-07-29
Payer: COMMERCIAL

## 2020-07-29 VITALS
RESPIRATION RATE: 18 BRPM | OXYGEN SATURATION: 99 % | SYSTOLIC BLOOD PRESSURE: 124 MMHG | DIASTOLIC BLOOD PRESSURE: 96 MMHG | HEIGHT: 65 IN | WEIGHT: 152 LBS | BODY MASS INDEX: 25.33 KG/M2 | TEMPERATURE: 98.2 F | HEART RATE: 74 BPM

## 2020-07-29 DIAGNOSIS — F51.01 PRIMARY INSOMNIA: ICD-10-CM

## 2020-07-29 DIAGNOSIS — T14.90XA INJURY: ICD-10-CM

## 2020-07-29 DIAGNOSIS — M54.41 ACUTE RIGHT-SIDED LOW BACK PAIN WITH RIGHT-SIDED SCIATICA: Primary | ICD-10-CM

## 2020-07-29 DIAGNOSIS — M25.561 ACUTE PAIN OF RIGHT KNEE: ICD-10-CM

## 2020-07-29 DIAGNOSIS — E66.3 OVERWEIGHT: ICD-10-CM

## 2020-07-29 LAB
ALBUMIN SERPL-MCNC: 4.3 G/DL (ref 3.8–4.9)
ALBUMIN/GLOB SERPL: 1.8 {RATIO} (ref 1.2–2.2)
ALP SERPL-CCNC: 98 IU/L (ref 39–117)
ALT SERPL-CCNC: 15 IU/L (ref 0–32)
AST SERPL-CCNC: 25 IU/L (ref 0–40)
BASOPHILS # BLD AUTO: 0 X10E3/UL (ref 0–0.2)
BASOPHILS NFR BLD AUTO: 1 %
BILIRUB SERPL-MCNC: 0.4 MG/DL (ref 0–1.2)
BUN SERPL-MCNC: 14 MG/DL (ref 8–27)
BUN/CREAT SERPL: 14 (ref 12–28)
CALCIUM SERPL-MCNC: 9.4 MG/DL (ref 8.7–10.3)
CHLORIDE SERPL-SCNC: 104 MMOL/L (ref 96–106)
CO2 SERPL-SCNC: 20 MMOL/L (ref 20–29)
CREAT SERPL-MCNC: 0.98 MG/DL (ref 0.57–1)
EOSINOPHIL # BLD AUTO: 0.1 X10E3/UL (ref 0–0.4)
EOSINOPHIL NFR BLD AUTO: 1 %
ERYTHROCYTE [DISTWIDTH] IN BLOOD BY AUTOMATED COUNT: 12.3 % (ref 11.7–15.4)
GLOBULIN SER-MCNC: 2.4 G/DL (ref 1.5–4.5)
GLUCOSE SERPL-MCNC: 88 MG/DL (ref 65–99)
HCT VFR BLD AUTO: 37.2 % (ref 34–46.6)
HGB BLD-MCNC: 12.1 G/DL (ref 11.1–15.9)
IMM GRANULOCYTES # BLD: 0 X10E3/UL (ref 0–0.1)
IMM GRANULOCYTES NFR BLD: 0 %
LAB AP GYN PRIMARY INTERPRETATION: NORMAL
LYMPHOCYTES # BLD AUTO: 1.8 X10E3/UL (ref 0.7–3.1)
LYMPHOCYTES NFR BLD AUTO: 34 %
Lab: NORMAL
MCH RBC QN AUTO: 32.4 PG (ref 26.6–33)
MCHC RBC AUTO-ENTMCNC: 32.5 G/DL (ref 31.5–35.7)
MCV RBC AUTO: 100 FL (ref 79–97)
MONOCYTES # BLD AUTO: 0.4 X10E3/UL (ref 0.1–0.9)
MONOCYTES NFR BLD AUTO: 8 %
NEUTROPHILS # BLD AUTO: 2.8 X10E3/UL (ref 1.4–7)
NEUTROPHILS NFR BLD AUTO: 56 %
PLATELET # BLD AUTO: 275 X10E3/UL (ref 150–450)
POTASSIUM SERPL-SCNC: 3.9 MMOL/L (ref 3.5–5.2)
PROT SERPL-MCNC: 6.7 G/DL (ref 6–8.5)
RBC # BLD AUTO: 3.74 X10E6/UL (ref 3.77–5.28)
SL AMB EGFR AFRICAN AMERICAN: 73 ML/MIN/1.73
SL AMB EGFR NON AFRICAN AMERICAN: 63 ML/MIN/1.73
SODIUM SERPL-SCNC: 139 MMOL/L (ref 134–144)
WBC # BLD AUTO: 5.1 X10E3/UL (ref 3.4–10.8)

## 2020-07-29 PROCEDURE — 73564 X-RAY EXAM KNEE 4 OR MORE: CPT

## 2020-07-29 PROCEDURE — 99213 OFFICE O/P EST LOW 20 MIN: CPT | Performed by: NURSE PRACTITIONER

## 2020-07-29 PROCEDURE — 72100 X-RAY EXAM L-S SPINE 2/3 VWS: CPT

## 2020-07-29 RX ORDER — PHENTERMINE HYDROCHLORIDE 37.5 MG/1
37.5 TABLET ORAL DAILY
Qty: 30 TABLET | Refills: 0 | Status: SHIPPED | OUTPATIENT
Start: 2020-07-29 | End: 2020-08-26

## 2020-07-29 RX ORDER — CYCLOBENZAPRINE HCL 5 MG
5 TABLET ORAL 3 TIMES DAILY PRN
Qty: 30 TABLET | Refills: 0 | Status: SHIPPED | OUTPATIENT
Start: 2020-07-29 | End: 2021-03-10 | Stop reason: ALTCHOICE

## 2020-07-29 RX ORDER — ZOLPIDEM TARTRATE 10 MG/1
10 TABLET ORAL
Qty: 30 TABLET | Refills: 0 | Status: SHIPPED | OUTPATIENT
Start: 2020-07-29 | End: 2020-08-26

## 2020-07-29 NOTE — PROGRESS NOTES
[address] [facility]        NAME: Hiren Patricio is a 61 y o  female  : 1960    MRN: [id number]  DATE: 2020  TIME: 1:02 PM    Assessment and Plan   Acute right-sided low back pain without sciatica [M54 5]  1  Acute right-sided low back pain without sciatica  XR knee 4+ vw right injury    XR spine lumbar 2 or 3 views injury    cyclobenzaprine (FLEXERIL) 5 mg tablet   2  Acute pain of right knee           Patient Instructions     Take meds as directed  Muscle relaxants can cause drowsiness; be careful when operating machinery or driving  Follow up with PCP in 3-5 days  Proceed to  ER if symptoms worsen  Chief Complaint     Chief Complaint   Patient presents with    Back Pain     patient states her Dog ran into her hitting her in her  right knee   lower back pain radiading down to her leg it happen Thursday   Knee Injury         History of Present Illness       HPI   Patient states her dog ran into her right knee a few days ago  She started limping, and the pain on the R knee improved, and now is mild  However now she developed pain on the Right buttock area about 2 days ago  Moderate pain, worse with bending, sometimes radiating down the right lower extremity  No incontinence  Review of Systems   Review of Systems   Constitutional: Negative for chills and fever  Respiratory: Negative for cough, shortness of breath and wheezing  Cardiovascular: Negative for chest pain  Genitourinary: Negative for difficulty urinating  Musculoskeletal: Positive for back pain, gait problem (bc of pain) and myalgias  Neurological: Negative for weakness and numbness           Current Medications       Current Outpatient Medications:     buPROPion (WELLBUTRIN XL) 300 mg 24 hr tablet, Take 1 tablet (300 mg total) by mouth every morning, Disp: 90 tablet, Rfl: 3    EPINEPHrine (ADRENALIN) 0 1 % nasal solution, 0 5 mL into each nostril as needed, Disp: , Rfl:     etanercept (ENBREL SURECLICK) 50 MG/ML injection, Inject 1 mL (50 mg total) under the skin once a week, Disp: 12 Syringe, Rfl: 3    folic acid (FOLVITE) 1 mg tablet, Take by mouth daily, Disp: , Rfl:     levocetirizine (XYZAL) 5 MG tablet, Take 5 mg by mouth daily as needed for allergies, Disp: , Rfl:     methotrexate 2 5 mg tablet, Take 6 tablets (15 mg total) by mouth once a week, Disp: 72 tablet, Rfl: 0    sertraline (ZOLOFT) 100 mg tablet, Take 1 5 tablets (150 mg total) by mouth daily, Disp: 135 tablet, Rfl: 3    Triamcinolone Acetonide (NASACORT ALLERGY 24HR) 55 MCG/ACT AERO, 2 sprays each nostril daily, Disp: 16 5 g, Rfl: 3    cyclobenzaprine (FLEXERIL) 5 mg tablet, Take 1 tablet (5 mg total) by mouth 3 (three) times a day as needed for muscle spasms May cause drowsiness, Disp: 30 tablet, Rfl: 0    phentermine (ADIPEX-P) 37 5 MG tablet, Take 1 tablet (37 5 mg total) by mouth daily, Disp: 30 tablet, Rfl: 0    zolpidem (AMBIEN) 10 mg tablet, Take 1 tablet (10 mg total) by mouth daily at bedtime as needed for sleep, Disp: 30 tablet, Rfl: 0    Current Allergies     Allergies as of 07/29/2020 - Reviewed 07/29/2020   Allergen Reaction Noted    Plaquenil [hydroxychloroquine] Rash 04/02/2019            The following portions of the patient's history were reviewed and updated as appropriate: allergies, current medications, past family history, past medical history, past social history, past surgical history and problem list      Past Medical History:   Diagnosis Date    Allergic rhinitis     Anxiety     Depression     Osteoporosis     Rheumatoid arthritis ([address] )        Past Surgical History:   Procedure Laterality Date    AUGMENTATION MAMMAPLASTY Bilateral 2010    BREAST BIOPSY Right 2008    benign    KNEE SURGERY      SINUS SURGERY         Family History   Problem Relation Age of Onset    Hypertension Mother     Cancer Mother     Stroke Mother     Crohn's disease Mother     Hyperlipidemia Mother     Heart attack Mother    [facility] Breast cancer Mother 79    Hypertension Father     Cancer Father     Stroke Father     Hyperlipidemia Father     Heart attack Father     Hyperlipidemia Sister     Hyperlipidemia Brother          Medications have been verified  Objective   /96 (BP Location: Left arm, Patient Position: Sitting, Cuff Size: Standard)   Pulse 74   Temp 98 2 °F (36 8 °C) (Temporal)   Resp 18   Ht 5' 5" (1 651 m)   Wt 68 9 kg (152 lb)   SpO2 99%   BMI 25 29 kg/m²        Physical Exam     Physical Exam   Constitutional: She appears well-developed  Pulmonary/Chest: Effort normal and breath sounds normal    Musculoskeletal: She exhibits tenderness (with palpation of the gluteus nacho muscle of the R buttocks  Also with palpation of the lower anterior aspect of the R knee, laterally  No decrease in strength  Full ROM of the R knee  Limited ROM of the waist with flexion d/t spasm/pain on R bottock)

## 2020-07-29 NOTE — PATIENT INSTRUCTIONS
Acute Low Back Pain   WHAT YOU NEED TO KNOW:   Acute low back pain is sudden discomfort in your lower back area that lasts for up to 6 weeks  The discomfort makes it difficult to tolerate activity  DISCHARGE INSTRUCTIONS:   Return to the emergency department if:   · You have severe pain  · You have sudden stiffness and heaviness on both buttocks down to both legs  · You have numbness or weakness in one leg, or pain in both legs  · You have numbness in your genital area or across your lower back  · You cannot control your urine or bowel movements  Contact your healthcare provider if:   · You have a fever  · You have pain at night or when you rest     · Your pain does not get better with treatment  · You have pain that worsens when you cough or sneeze  · You suddenly feel something pop or snap in your back  · You have questions or concerns about your condition or care  Medicines: The following medicines may be ordered by your healthcare provider:  · Acetaminophen  decreases pain  It is available without a doctor's order  Ask how much to take and how often to take it  Follow directions  Acetaminophen can cause liver damage if not taken correctly  · NSAIDs  help decrease swelling and pain  This medicine is available with or without a doctor's order  NSAIDs can cause stomach bleeding or kidney problems in certain people  If you take blood thinner medicine, always ask your healthcare provider if NSAIDs are safe for you  Always read the medicine label and follow directions  · Prescription pain medicine  may be given  Ask your healthcare provider how to take this medicine safely  · Muscle relaxers  decrease pain by relaxing the muscles in your lower spine  · Take your medicine as directed  Contact your healthcare provider if you think your medicine is not helping or if you have side effects  Tell him of her if you are allergic to any medicine   Keep a list of the medicines, vitamins, and herbs you take  Include the amounts, and when and why you take them  Bring the list or the pill bottles to follow-up visits  Carry your medicine list with you in case of an emergency  Self-care:   · Stay active  as much as you can without causing more pain  Bed rest could make your back pain worse  Start with some light exercises such as walking  Avoid heavy lifting until your pain is gone  Ask for more information about the activities or exercises that are right for you  · Ice  helps decrease swelling, pain, and muscle spams  Put crushed ice in a plastic bag  Cover it with a towel  Place it on your lower back for 20 to 30 minutes every 2 hours  Do this for about 2 to 3 days after your pain starts, or as directed  · Heat  helps decrease pain and muscle spasms  Start to use heat after treatment with ice has stopped  Use a small towel dampened with warm water or a heating pad, or sit in a warm bath  Apply heat on the area for 20 to 30 minutes every 2 hours for as many days as directed  Alternate heat and ice  Prevent acute low back pain:   · Use proper body mechanics  ¨ Bend at the hips and knees when you  objects  Do not bend from the waist  Use your leg muscles as you lift the load  Do not use your back  Keep the object close to your chest as you lift it  Try not to twist or lift anything above your waist     ¨ Change your position often when you stand for long periods of time  Rest one foot on a small box or footrest, and then switch to the other foot often  ¨ Try not to sit for long periods of time  When you do, sit in a straight-backed chair with your feet flat on the floor  Never reach, pull, or push while you are sitting  · Do exercises that strengthen your back muscles  Warm up before you exercise  Ask your healthcare provider the best exercises for you  · Maintain a healthy weight  Ask your healthcare provider how much you should weigh   Ask him to help you create a weight loss plan if you are overweight  Follow up with your healthcare provider as directed:  Return for a follow-up visit if you still have pain after 1 to 3 weeks of treatment  You may need to visit an orthopedist if your back pain lasts more than 12 weeks  Write down your questions so you remember to ask them during your visits  © 2017 [employer]  Information is for End User's use only and may not be sold, redistributed or otherwise used for commercial purposes  All illustrations and images included in CareNotes® are the copyrighted property of [employer]  or [employer]  The above information is an  only  It is not intended as medical advice for individual conditions or treatments  Talk to your doctor, nurse or pharmacist before following any medical regimen to see if it is safe and effective for you

## 2020-07-29 NOTE — TELEPHONE ENCOUNTER
Medication: phentermine/Ambien  PDMP: No data in PDMP/no access to [employer] PDMP  Active agreement on file -No

## 2020-08-11 ENCOUNTER — HOSPITAL ENCOUNTER (OUTPATIENT)
Dept: RADIOLOGY | Facility: HOSPITAL | Age: 60
Discharge: HOME | End: 2020-08-11
Payer: COMMERCIAL

## 2020-08-11 ENCOUNTER — TRANSCRIBE ORDERS (OUTPATIENT)
Dept: ADMINISTRATIVE | Facility: HOSPITAL | Age: 60
End: 2020-08-11

## 2020-08-11 VITALS — BODY MASS INDEX: 24.59 KG/M2 | HEIGHT: 66 IN | WEIGHT: 153 LBS

## 2020-08-11 DIAGNOSIS — Z12.31 VISIT FOR SCREENING MAMMOGRAM: ICD-10-CM

## 2020-08-11 PROCEDURE — 77063 BREAST TOMOSYNTHESIS BI: CPT

## 2020-08-11 PROCEDURE — 77067 SCR MAMMO BI INCL CAD: CPT

## 2020-08-18 ENCOUNTER — TELEPHONE (OUTPATIENT)
Dept: FAMILY MEDICINE CLINIC | Facility: CLINIC | Age: 60
End: 2020-08-18

## 2020-08-18 ENCOUNTER — OFFICE VISIT (OUTPATIENT)
Dept: OBGYN CLINIC | Facility: OTHER | Age: 60
End: 2020-08-18
Payer: COMMERCIAL

## 2020-08-18 VITALS
TEMPERATURE: 97.6 F | DIASTOLIC BLOOD PRESSURE: 84 MMHG | SYSTOLIC BLOOD PRESSURE: 128 MMHG | BODY MASS INDEX: 25.66 KG/M2 | WEIGHT: 154 LBS | HEART RATE: 101 BPM | HEIGHT: 65 IN

## 2020-08-18 DIAGNOSIS — M54.16 RADICULOPATHY, LUMBAR REGION: Primary | ICD-10-CM

## 2020-08-18 PROCEDURE — 1036F TOBACCO NON-USER: CPT | Performed by: ORTHOPAEDIC SURGERY

## 2020-08-18 PROCEDURE — 3008F BODY MASS INDEX DOCD: CPT | Performed by: ORTHOPAEDIC SURGERY

## 2020-08-18 PROCEDURE — 99244 OFF/OP CNSLTJ NEW/EST MOD 40: CPT | Performed by: ORTHOPAEDIC SURGERY

## 2020-08-18 RX ORDER — GABAPENTIN 300 MG/1
300 CAPSULE ORAL
Qty: 30 CAPSULE | Refills: 1 | Status: SHIPPED | OUTPATIENT
Start: 2020-08-18 | End: 2020-08-22

## 2020-08-18 NOTE — TELEPHONE ENCOUNTER
Hermelinda's dog had run into her right leg below the knee 3-4 weeks ago  She is having burning pain from her right knee to her ankle  She has tried ice, heat, tylenol, walking and elevation  At home treatment only helps temporarily  Hermelinda was seen [facility]  She had xrays done of her hip and knee  No fractures  Hermelinda is requesting to be seen by Dr Avinash Grimes next available appointment is 09/16/2020  Patient does not want to wait that long   Please advise

## 2020-08-18 NOTE — PATIENT INSTRUCTIONS
I believe your symptoms are coming from your back  We will order an MRI of your back at this point  You will need to follow-up after this study  If the MRI looks okay we may consider PT at that point

## 2020-08-18 NOTE — PROGRESS NOTES
1  Radiculopathy, lumbar region  gabapentin (NEURONTIN) 300 mg capsule    MRI lumbar spine wo contrast     Orders Placed This Encounter   Procedures    MRI lumbar spine wo contrast        Imaging Studies (I personally reviewed images in PACS and report):    07/29/2020 XR lumbar spine and right knee  Mild osteoarthritis of right knee  Degenerative changes of lumbar spine    ASSESSMENT/PLAN:  Discussed treatment options with the patient today  Given her objective weakness on the right side of her LE, in the setting of an acute injury, we have made the shared informed decision to proceed with MRI at this point  If there is significant spinal pathology may consider referral to spine surgeon vs pain for epidural injections  If this is not indicated, would consider PT  She will continue with Tylenol at this time, as it has been keeping pain fairly well controlled  We will also send in a trial of Neurontin given her neurogenic pain  We have discussed risks, benefits, alternatives to this management, as well as medication side effects  She will follow-up after imaging  Return for [facility]  Repeat XR Next Visit: No    _____________________________________________________________  CHIEF COMPLAINT:  Acute right leg pain    HPI:  Hermelinda Song is a 61 y o  female w/ a history of RA, insomnia, and depression who presents for acute traumatic right leg pain  DOI was on 7/22/2020 when her bulldog ran into her right leg  She isn't sure of the exact mechanism of injury  She felt immediate pain after this  Denies any popping at that time  Going up stairs, standing, walking makes the pain worse  She has tried icing, heat, relative rest, Tylenol  She can't take NSAIDs because of esophagitis history  Pain is primarily located over the lateral malleolus  The pain does radiate up the lateral aspect of the right leg to the level of the knee  She went to the ER when it first happened   They got XR's which revealed no fractures  Denies any current weakness  The pain is a burning pain  "I've lost a lot of sleep because it burns"  The pain is intermittent, but Tylenol helps  Denies any bruising or swelling  Denies any ankle surgeries  She had two arthroscopic surgeries on the left and one on the right  Denies any significant ankle injuries  Never had physical therapy for this  She denies any saddle parasthesia, loss of bowel or bladder control, fevers, chills, night sweats, unexplained weight loss  Review of Systems   Constitutional: Negative for chills, fever and unexpected weight change  HENT: Negative for hearing loss, nosebleeds and sore throat  Eyes: Negative for pain, redness and visual disturbance  Respiratory: Negative for cough, shortness of breath and wheezing  Cardiovascular: Negative for chest pain, palpitations and leg swelling  Gastrointestinal: Negative for abdominal pain, nausea and vomiting  Endocrine: Negative for polydipsia and polyuria  Genitourinary: Negative for dysuria and hematuria  Skin: Negative for rash and wound  Neurological: Negative for dizziness, numbness and headaches  Psychiatric/Behavioral: Negative for decreased concentration, dysphoric mood and suicidal ideas  The patient is not nervous/anxious          Following history reviewed and update:    Past Medical History:   Diagnosis Date    Allergic rhinitis     Anxiety     Depression     Osteoporosis     Rheumatoid arthritis ([facility] [address] )      Past Surgical History:   Procedure Laterality Date    AUGMENTATION MAMMAPLASTY Bilateral 2010    BREAST BIOPSY Right 2008    benign    KNEE SURGERY      SINUS SURGERY       Social History   Social History     Substance and Sexual Activity   Alcohol Use Yes    Alcohol/week: 2 0 standard drinks    Types: 2 Glasses of wine per week    Frequency: Monthly or less    Drinks per session: 1 or 2    Binge frequency: Never     Social History     Substance and Sexual Activity   Drug Use Not Currently     Social History     Tobacco Use   Smoking Status Never Smoker   Smokeless Tobacco Never Used     Family History   Problem Relation Age of Onset    Hypertension Mother     Cancer Mother     Stroke Mother     Crohn's disease Mother     Hyperlipidemia Mother     Heart attack Mother    [facility] Breast cancer Mother 79    Hypertension Father     Cancer Father     Stroke Father     Hyperlipidemia Father     Heart attack Father     Hyperlipidemia Sister     Hyperlipidemia Brother      Allergies   Allergen Reactions    Plaquenil [Hydroxychloroquine] Rash          Physical Exam  /84 (BP Location: Left arm, Patient Position: Sitting, Cuff Size: Standard)   Pulse 101   Temp 97 6 °F (36 4 °C)   Ht 5' 5" (1 651 m)   Wt 69 9 kg (154 lb)   BMI 25 63 kg/m²     Constitutional:  see vital signs  Gen: well-developed, normocephalic/atraumatic, well-groomed  Eyes: No inflammation or discharge of conjunctiva or lids; sclera clear   Pharynx: no inflammation, lesion, or mass of lips  Neck: supple, no masses, non-distended  MSK: no inflammation, lesion, mass, or clubbing of nails and digits except for other than mentioned below  SKIN: no visible rashes or skin lesions  Pulmonary/Chest: Effort normal  No respiratory distress     NEURO: cranial nerves grossly intact  PSYCH:  Alert and oriented to person, place, and time; recent and remote memory intact; mood normal, no depression, anxiety, or agitation, judgment and insight good and intact     Ortho Exam  BACK EXAM:  Gait: normal, no trendelenberg gait, no antalgic gait    BACK TENDERNESS:  Spinous Processes: no  Paraspinal Muscles: YES; right side  SI Joint: mild on the right  Sacrum: no    ROM:  Flexion: intact  Extension: intact  Sidebending: intact    DERMATOMAL SENSATION:  L1: normal   L2: normal   L3: normal   L4: normal   L5: abnormal  S1: abnormal    STRENGTH (bilateral):  Knee Extension: 5/5  Knee Flexion: 5/5  Foot Dorsiflexion: 3/5  Great Toe Extension: 3/5  Foot Plantarflexion: 5/5  Hip Flexion: 5/5  Hip Abduction: 5/5    REFLEXES:  Patellar: 3+ bilateral  Achilles: 3+ bilateral  Clonus: negative bilateral    BACK:   SUPINE STRAIGHT LEG: POSITIVE ON RIGHT  PRONE STRAIGHT LEG:  SLUMP: negative    HIP:  LOG ROLL: negative  RADHA: negative  FADIR: negative             Portions of the record may have been created with voice recognition software  Occasional wrong word or "sound alike" substitutions may have occurred due to the inherent limitations of voice recognition software  Please review the chart carefully and recognize, using context, where substitutions/typographical errors may have occurred

## 2020-08-20 ENCOUNTER — TELEPHONE (OUTPATIENT)
Dept: OBGYN CLINIC | Facility: HOSPITAL | Age: 60
End: 2020-08-20

## 2020-08-20 NOTE — TELEPHONE ENCOUNTER
Dr Debby Freed  Re: Marlena Baker  Fax# [phone]  #  : Deborah Goncalves is needed for MRI     Address:[address] [place] [address]   Date: Rusty Gomes on august 26th @ 9am

## 2020-08-22 ENCOUNTER — TELEPHONE (OUTPATIENT)
Dept: OTHER | Facility: OTHER | Age: 60
End: 2020-08-22

## 2020-08-22 ENCOUNTER — TELEPHONE (OUTPATIENT)
Dept: OBGYN CLINIC | Facility: OTHER | Age: 60
End: 2020-08-22

## 2020-08-22 DIAGNOSIS — M54.16 RADICULOPATHY, LUMBAR REGION: ICD-10-CM

## 2020-08-22 RX ORDER — GABAPENTIN 300 MG/1
300 CAPSULE ORAL 3 TIMES DAILY
Qty: 30 CAPSULE | Refills: 0 | Status: SHIPPED | OUTPATIENT
Start: 2020-08-22 | End: 2020-09-14

## 2020-08-22 NOTE — TELEPHONE ENCOUNTER
Hermelinda Hall [birth year] [phone]  Pt of Dr Raul Carrillo  Pt is waiting on MRI but states that she has extreme spine pain bc of degenerative issues  Not able to walk, thinks the nerve is pinched  States tylenol isn't touching it       Paged Dr Mary Lua via [facility]

## 2020-08-22 NOTE — TELEPHONE ENCOUNTER
This telephone calls in regard to message from patient on 08/22/2020  She reports that the pain over lower back had been increasing for the past couple days without new injury  Pain still radiates down her right lower extremity but has no worsening weakness and denies bowel/bladder incontinence  She has been taking maximum dose acetaminophen without much relief  Neurontin 300mg PO QHS had mildly helped but did not last long  Plan:  Due to her contraindication for NSAIDs (history of esophagitis), I recommended she taper upper gabapentin 300mg dose from once a day for up to a maximum of 3 times daily  I also discussed red flag signs to go to the ER if she developed bowel incontinence or sudden worsening weakness  Patient acknowledged and agreed with plan

## 2020-08-24 ENCOUNTER — TELEPHONE (OUTPATIENT)
Dept: OBGYN CLINIC | Facility: HOSPITAL | Age: 60
End: 2020-08-24

## 2020-08-24 ENCOUNTER — TELEPHONE (OUTPATIENT)
Dept: OBGYN CLINIC | Facility: OTHER | Age: 60
End: 2020-08-24

## 2020-08-24 NOTE — TELEPHONE ENCOUNTER
Kranthi Amezcua,    This patient needs a pre-auth for her MRI  There is a telephone message in this regard from [facility]

## 2020-08-24 NOTE — TELEPHONE ENCOUNTER
Patient is calling to state that the gabapentin (NEURONTIN) 300 mg capsule, is not helping  She is still feeling tingling and pain in her right leg  Both have increased over the weekend and she can't sleep  Also sent a message to Carl Rodriguez regarding her MRI pre-auth, her appt for the MRI is this Wednesday      Callback RI#[phone]

## 2020-08-24 NOTE — TELEPHONE ENCOUNTER
I tried calling the patient at 5:56 p m  today but was unable to reach the patient  I have left a voice mail message for her to call back to discuss further medication management options

## 2020-08-24 NOTE — TELEPHONE ENCOUNTER
MRI has been authorized      Auth# [id number]    Valid: 8/24/20-11/21/20    Called and gave auth information to patient and [facility]

## 2020-08-24 NOTE — TELEPHONE ENCOUNTER
Patient is calling to check on the status of her pre-auth of MRI, appt is this Wednesday      Callback #[phone]

## 2020-08-26 ENCOUNTER — TELEPHONE (OUTPATIENT)
Dept: OBGYN CLINIC | Facility: OTHER | Age: 60
End: 2020-08-26

## 2020-08-26 DIAGNOSIS — E66.3 OVERWEIGHT: ICD-10-CM

## 2020-08-26 DIAGNOSIS — F51.01 PRIMARY INSOMNIA: ICD-10-CM

## 2020-08-26 DIAGNOSIS — M54.16 RADICULOPATHY, LUMBAR REGION: Primary | ICD-10-CM

## 2020-08-26 RX ORDER — NAPROXEN 500 MG/1
500 TABLET ORAL 2 TIMES DAILY WITH MEALS
Qty: 20 TABLET | Refills: 0 | Status: SHIPPED | OUTPATIENT
Start: 2020-08-26 | End: 2021-03-10 | Stop reason: ALTCHOICE

## 2020-08-26 RX ORDER — ZOLPIDEM TARTRATE 10 MG/1
TABLET ORAL
Qty: 30 TABLET | Refills: 0 | Status: SHIPPED | OUTPATIENT
Start: 2020-08-26 | End: 2020-10-09

## 2020-08-26 RX ORDER — PHENTERMINE HYDROCHLORIDE 37.5 MG/1
TABLET ORAL
Qty: 30 TABLET | Refills: 0 | Status: SHIPPED | OUTPATIENT
Start: 2020-08-26 | End: 2020-11-30

## 2020-08-26 NOTE — TELEPHONE ENCOUNTER
I called the patient's phone on 8/26/2020 at 8:45 a m  Blanka Gutierrez Unable to reach the patient again  Left a voice mail message with our office number to call back with any concerns

## 2020-08-26 NOTE — TELEPHONE ENCOUNTER
I spoke to the patient today  I will prescribe her oral naproxen 500 mg twice a day as needed for pain control in addition to the current regimen of gabapentin  The patient reports that the gabapentin has started helping her now to some extent but she plans to travel to [place] to meet her father next week  She arrived late for her MRI appointment this morning and hence it was rescheduled to later this week  She will follow up in the office after her MRI

## 2020-09-14 ENCOUNTER — OFFICE VISIT (OUTPATIENT)
Dept: OBGYN CLINIC | Facility: OTHER | Age: 60
End: 2020-09-14
Payer: COMMERCIAL

## 2020-09-14 VITALS
BODY MASS INDEX: 24.86 KG/M2 | SYSTOLIC BLOOD PRESSURE: 117 MMHG | TEMPERATURE: 96.7 F | HEIGHT: 66 IN | DIASTOLIC BLOOD PRESSURE: 74 MMHG | HEART RATE: 83 BPM

## 2020-09-14 DIAGNOSIS — M54.16 RADICULOPATHY, LUMBAR REGION: Primary | ICD-10-CM

## 2020-09-14 PROCEDURE — 99213 OFFICE O/P EST LOW 20 MIN: CPT | Performed by: ORTHOPAEDIC SURGERY

## 2020-09-14 RX ORDER — GABAPENTIN 300 MG/1
300 CAPSULE ORAL 3 TIMES DAILY
Qty: 90 CAPSULE | Refills: 1 | Status: SHIPPED | OUTPATIENT
Start: 2020-09-14 | End: 2021-03-10 | Stop reason: ALTCHOICE

## 2020-09-14 NOTE — PROGRESS NOTES
Assessment:       1  Radiculopathy, lumbar region          Plan:        Explained my current clinical findings and reviewed radiological findings with Hermelinda today  Her symptoms are likely secondary to lumbar radiculopathy on the right side at the L5 root level  In this regard I will refer her to pain management for consideration of lumbar epidural cortisone injection  In the interim, she may continue with oral gabapentin for pain control  Subjective:     Patient ID: Toney Christine is a 61 y o  female  Chief Complaint:    HPI  Mayelin Wan is a 80-year-old lady who presents today for a follow-up of right leg pain  She was reportedly hit by a bull dog on her right leg on 7/22/2020  Thereafter, she developed pain in her right leg that was present in the lateral aspect and radiated from the lateral malleolar area proximally to the knee  The pain was described as burning sensation and clinical evaluation had revealed a suspicion for right-sided lumbar radiculopathy  Plain radiograph of the right knee which was performed on 7/29/2020 did not reveal any acute osseous injury or significant degenerative change  She subsequently also had an MRI of her lumbar spine  This was performed on 8/31/2020 and reveals diffuse disc herniation radius in the canal diameter mostly on the right side at the L5-S1 level, broad-based disc protrusion towards the left neural foraminal at L4-L5 level, eccentric disc protrusion toward left neural foramina at L3-L4 level  Diffuse right paramedian disc herniation at L2-L3 with a left far lateral disc protrusion also noted  Shallow disc protrusion to the left at L1-L2 level and degenerative changes at the T11-T12 level  Social History     Occupational History    Not on file   Tobacco Use    Smoking status: Never Smoker    Smokeless tobacco: Never Used   Substance and Sexual Activity    Alcohol use:  Yes     Alcohol/week: 2 0 standard drinks     Types: 2 Glasses of wine per week     Frequency: Monthly or less     Drinks per session: 1 or 2     Binge frequency: Never    Drug use: Not Currently    Sexual activity: Not on file      Review of Systems   Constitutional: Negative  HENT: Negative  Eyes: Negative  Respiratory: Negative  Cardiovascular: Negative  Gastrointestinal: Negative  Endocrine: Negative  Genitourinary: Negative  Skin: Negative  Allergic/Immunologic: Negative  Neurological: Negative  Hematological: Negative  Psychiatric/Behavioral: Negative  Objective:     Ortho ExamPhysical Exam  Vitals signs and nursing note reviewed  Constitutional:       Appearance: She is well-developed  HENT:      Head: Normocephalic and atraumatic  Eyes:      Conjunctiva/sclera: Conjunctivae normal       Pupils: Pupils are equal, round, and reactive to light  Cardiovascular:      Rate and Rhythm: Normal rate and regular rhythm  Pulmonary:      Effort: Pulmonary effort is normal  No respiratory distress  Skin:     General: Skin is warm and dry  Findings: No erythema  Neurological:      Mental Status: She is alert and oriented to person, place, and time  Cranial Nerves: No cranial nerve deficit  Psychiatric:         Behavior: Behavior normal          Thought Content: Thought content normal          Judgment: Judgment normal          Lumbar spine exam:  Tender to palpation at the L4-L5 level as well as the bilateral SI joints  SLR positive on the right side  Right lower extremity L5 dermatomal hypoesthesia noted  2+ ankle and knee deep tendon reflexes bilaterally  There is some  Improvement of the right great toe extension noted compared to the last office visit  Today this is noted to be 4/5 in strength compared to 03/05 in strength at the last visit  Otherwise myotomal strength is normal bilaterally  I have personally reviewed pertinent films in PACS and my interpretation is as noted below: Jadon Dodge

## 2020-09-16 ENCOUNTER — TELEPHONE (OUTPATIENT)
Dept: OBGYN CLINIC | Facility: HOSPITAL | Age: 60
End: 2020-09-16

## 2020-09-16 NOTE — TELEPHONE ENCOUNTER
Dr Kannan Tavarez    There is an issue with the computer at the pharmacy, they are asking for the rx to please be phoned in     Patient cb # [phone]    Medication   gabapentin (NEURONTIN) 300 mg capsule [[id number]]   gabapentin (NEURONTIN) 300 mg capsule [[id number]]     Order Details   Dose: 300 mg  Route: Oral  Frequency: 3 times daily    Dispense Quantity: 90 capsule  Refills: 1  Fills remaining: --             Sig: Take 1 capsule (300 mg total) by mouth 3 (three) times a day            Written Date: 09/14/20  Expiration Date: 09/14/21      Start Date: 09/14/20  End Date: --              Ordering Provider: --  Phone:  --  Fax:  --     Address:  --  FARIBA #:  --  NPI:  --             Authorizing Provider: Hemalatha Stringer MD  Phone:  [phone]  Fax:  [phone]     Address:  [address] [place] [address] [place] [address]  FARIBA #:  [id number]  NPI:  [id number]             Ordering User:  Hemalatha Stringer MD             Diagnosis Association: Radiculopathy, lumbar region (M54 16)       Pharmacy:  [facility] [id number] [address]    Phone:  [phone]    Fax:  [phone]    Address:  [place] [id number] [place] [address] #:  --    Pharmacy Comments: --

## 2020-09-19 NOTE — PROGRESS NOTES
Assessment and Plan:   Ms Cristi Pitt a 43-year-old female with history significant for rheumatoid arthritis, osteopenia and lumbar degenerative arthritis, who presents for follow-up   She is currently on subcutaneous Enbrel 50 mg weekly and methotrexate 6 tablets once weekly        # Seronegative rheumatoid arthritis  - Hermelinda presents today for follow-up of rheumatoid arthritis, and has been well controlled on a regimen of subcutaneous Enbrel 50 mg weekly and oral [id number] 6 OXIFBBK weekly with folic acid 1 mg daily  I advised her if she is willing we can try to decrease the methotrexate to 5 tablets once weekly and she is going to make this change  I requested she contact me if she is to notice any worsening symptoms  She is due for high risk medication lab monitoring in the next month      # Lumbar DJD  - Continue follow up with Pain Management       # Osteopenia  - We reviewed her recently done DEXA scan in February 2020 which is consistent with osteopenia, but shows a slightly increased risk of major osteoporotic fractures based on the FRAX tool  I advised her that as her DEXA scan overall appears to be stable without a history of fragility fractures, we do have the option to continue monitoring for now with a repeat DEXA scan in the next 1-2 years  On the other hand I also discussed with her the rationale behind initiating medications such as antiresorptives (bisphosphonate therapy)  We briefly discussed alendronate as a first-line option and I reviewed the side effects with her including but not limited to, risk for arthralgias, GI upset, osteonecrosis of the jaw or atypical fractures  She would like to hold off on any specific osteoporosis medications for now and discuss further after the next DEXA scan is done    - I advised her in the interim to take daily calcium and vitamin-D supplements        Plan:  Diagnoses and all orders for this visit:    Rheumatoid arthritis of multiple sites with negative rheumatoid factor (HCC)  -     methotrexate 2 5 mg tablet; Take 6 tablets (15 mg total) by mouth once a week    Long-term use of immunosuppressant medication    Methotrexate, long term, current use  -     CBC and differential; Future  -     Comprehensive metabolic panel; Future    Osteoarthritis of lumbar spine, unspecified spinal osteoarthritis complication status    Osteopenia, unspecified location      Activities as tolerated    Diet: low carb/low fat, more greens/vegetables, adequate hydration  Exercise: try to maintain a low impact exercise regimen as much as possible  Walk for 30 minutes a day for at least 3 days a week    Encouraged to maintain good sleep hygiene  Continue other medications as prescribed by PCP and other specialists        RTC in 6 months         HPI    INITIAL VISIT NOTE:  Ms Badillo Abts a 44-year-old female with history significant for rheumatoid arthritis, osteopenia and lumbar degenerative arthritis, who presents for further management of rheumatoid arthritis and establishing with [facility]  Wan Mallory is currently on subcutaneous Enbrel 50 mg weekly and methotrexate 8 tablets weekly  Wan Mallory is referred by Dr Alexx Membreno     Patient reports she was diagnosed with rheumatoid arthritis at the age of 27 when she initially presented with left knee pain and swelling   She states since then it has gradually progressed to involve multiple joints   She has tried multiple medications including hydroxychloroquine which caused a skin rash, gold injections, sulfasalazine and possibly other injectables although she cannot recall the names  Wan Mallory has been well maintained on a combination of subcutaneous Enbrel 50 mg weekly and methotrexate 8 tablets weekly with folic acid 1 mg daily for the past 18-20 years  Wan Mallory has not required to change her medications   She states overall her joint pains have been well controlled although on occasion she will notice an achiness sensation involving her hands, wrists and elbows   Intermittently she will notice swelling affecting her hands and feet   She also experiences morning stiffness primarily involving her hands and feet, and states the feet stiffness resolves right away but the hand stiffness persists for less than 30 minutes   She is currently not taking any steroids or over-the-counter pain medications   She refrains from NSAID use due to a history of esophagitis  Annamarie Crum mentions previously she had been on chronic courses of steroids but has not required this in the past few years   Within the last year she has been dealing with increasing pain affecting her lower back radiating into her bilateral legs associated with a sensation of weakness  Annamariereji Crum was seen by Orthopedics in [place] and apparently had imaging done which showed osteoarthritic changes affecting her lumbar spine   She did complete a course of physical therapy prior to relocating to this area  Annmaariereji Crum is not interested in seeing the [facility] at this time  Annamarie Crum manages her pain with Tylenol as needed      Overall she has been tolerating the Enbrel and methotrexate well without any concerns for side effects   She denies fevers, chills, unintentional weight loss, mouth ulcers, skin rash or GI issues   No other complaints noted at this time         9/9/2019:  Patient presents for a follow-up today   She is currently on subcutaneous Enbrel 50 mg weekly and oral methotrexate 8 tablets weekly with folic acid 1 mg daily      We reviewed her labs done following the prior office visit which revealed an unremarkable CBC, CMP, ESR, CRP, chronic hepatitis panel, QuantiFERON TB gold, vitamin-D, rheumatoid factor and anti CCP antibody   X-rays of her bilateral hands and feet did not show any inflammatory changes, but showed very minimal degenerative changes      She reports overall being stable on her current regimen of the Enbrel and methotrexate   She has not had any flare-ups of joint pain or swelling   Only on occasion will she experience mild pain around her hips and knees   She does experience morning stiffness which affects her diffusely, but lasts only a few minutes   She has not had to take any over-the-counter pain medications for her joint pains   She has been tolerating both medications well without any concerns for side effects or recurrent infections   When she does experience an achy sensation in her bilateral legs she does take Tylenol if needed      She is unsure when her last bone density exam was, but thinks it may have been more than 2 years ago  Julio Cesar Williamson does take calcium and vitamin-D supplements daily for a history of osteopenia      She denies any other complaints at this time         1/10/2020:  Patient presents for follow-up of seronegative rheumatoid arthritis  Julio Cesar Williamson is currently on oral methotrexate at 7 tablets once weekly with folic acid 1 mg daily, and Enbrel 50 mg once weekly   We reviewed her labs done following the prior office visit which showed an unremarkable CBC and CMP      She reports overall with decreasing her methotrexate from 8-7 tablets weekly there was no flare-up noted in her joint pains   She is currently denying any joint pain or swelling   She experiences 1 minutes of morning stiffness affecting her hands and feet   She has not had to take any over-the-counter pain medications   She has overall been tolerating the methotrexate and Enbrel well without any concerns for side effects or infections      She is yet to schedule the DEXA scan   She does take calcium and vitamin-D supplements daily         5/18/2020:  Patient presents for a follow-up of seronegative rheumatoid arthritis  She is currently on oral methotrexate at 6 tablets once weekly with folic acid 1 mg daily, and Enbrel 50 mg once weekly  She is due to get her high risk medication lab monitoring done    We reviewed her DEXA scan done in February 2020 which showed osteopenia, but her 10 year risk of major osteoporotic fracture as calculated by the FRAX score was slightly elevated at 23 8%      In terms of the rheumatoid arthritis, she reports that she is overall doing well at this time and denies any significant concerns for joint flare-ups including pain, swelling or stiffness  She is tolerating her regimen well without any concerns for side effects or infections  Over the past 2 months she has noticed left elbow pain which was initially sharp, but now she describes it as a dull constant pain  She states that activities such as lifting heavy items will worsen the pain and she feels slightly decreased strength in that arm  She does take Tylenol as needed which helps      For the osteopenia history, she does take daily calcium supplements but not vitamin-D  She denies a history of fragility fractures  9/21/2020:  Patient presents for a follow-up of seronegative rheumatoid arthritis  She is currently on oral methotrexate at 6 tablets once weekly with folic acid 1 mg daily, and Enbrel 50 mg once weekly  I reviewed her labs from July which were normal     She reports in terms of the rheumatoid arthritis she has been doing very well and denies any joint pains or swelling  She does experience morning stiffness of her hands which takes a few minutes to improve  She has been tolerating the methotrexate and Enbrel well without any concerns for side effects or infections  Over the past few months she has been dealing with low back pain with radiation down her right leg for which she contacted me  She was referred to Orthopedics and had an MRI of her lumbar spine done which showed multilevel disc herniations and bulges  She is going to be seeing pain management on Friday to discuss interventional procedures  For the osteopenia history she is on daily calcium and vitamin-D supplements  She denies a history of fragility fractures      The following portions of the patient's history were reviewed and updated as appropriate: allergies, current medications, past family history, past medical history, past social history, past surgical history and problem list       Review of Systems  Constitutional: Negative for weight change, fevers, chills, night sweats, fatigue  ENT/Mouth: Negative for hearing changes, ear pain, nasal congestion, sinus pain, hoarseness, sore throat, rhinorrhea, swallowing difficulty  Eyes: Negative for pain, redness, discharge, vision changes  Cardiovascular: Negative for chest pain, SOB, palpitations  Respiratory: Negative for cough, sputum, wheezing, dyspnea  Gastrointestinal: Negative for nausea, vomiting, diarrhea, constipation, pain, heartburn  Genitourinary: Negative for dysuria, urinary frequency, hematuria  Musculoskeletal: As per HPI  Skin: Negative for skin rash, color changes  Neuro: Negative for weakness, numbness, tingling, loss of consciousness  Psych: Negative for anxiety, depression  Heme/Lymph: Negative for easy bruising, bleeding, lymphadenopathy          Past Medical History:   Diagnosis Date    Allergic rhinitis     Anxiety     Depression     Osteoporosis     Rheumatoid arthritis ([facility] [address] )        Past Surgical History:   Procedure Laterality Date    AUGMENTATION MAMMAPLASTY Bilateral 2010    BREAST BIOPSY Right 2008    benign    KNEE SURGERY      SINUS SURGERY         Social History     Socioeconomic History    Marital status: /Civil Union     Spouse name: Not on file    Number of children: Not on file    Years of education: Not on file    Highest education level: Not on file   Occupational History    Not on file   Social Needs    Financial resource strain: Not on file    Food insecurity     Worry: Not on file     Inability: Not on file   [employer] needs     Medical: Not on file     Non-medical: Not on file   Tobacco Use    Smoking status: Never Smoker    Smokeless tobacco: Never Used   Substance and Sexual Activity  Alcohol use:  Yes     Alcohol/week: 2 0 standard drinks     Types: 2 Glasses of wine per week     Frequency: Monthly or less     Drinks per session: 1 or 2     Binge frequency: Never    Drug use: Not Currently    Sexual activity: Not on file   Lifestyle    Physical activity     Days per week: Not on file     Minutes per session: Not on file    Stress: Not on file   Relationships    Social connections     Talks on phone: Not on file     Gets together: Not on file     Attends Synagogue service: Not on file     Active member of club or organization: Not on file     Attends meetings of clubs or organizations: Not on file     Relationship status: Not on file    Intimate partner violence     Fear of current or ex partner: Not on file     Emotionally abused: Not on file     Physically abused: Not on file     Forced sexual activity: Not on file   Other Topics Concern    Not on file   Social History Narrative    Not on file       Family History   Problem Relation Age of Onset    Hypertension Mother     Cancer Mother     Stroke Mother     Crohn's disease Mother     Hyperlipidemia Mother     Heart attack Mother    Brianna Mohan Breast cancer Mother 79    Hypertension Father     Cancer Father     Stroke Father     Hyperlipidemia Father     Heart attack Father     Hyperlipidemia Sister     Hyperlipidemia Brother        Allergies   Allergen Reactions    Plaquenil [Hydroxychloroquine] Rash       Current Outpatient Medications:     buPROPion (WELLBUTRIN XL) 300 mg 24 hr tablet, Take 1 tablet (300 mg total) by mouth every morning, Disp: 90 tablet, Rfl: 3    cyclobenzaprine (FLEXERIL) 5 mg tablet, Take 1 tablet (5 mg total) by mouth 3 (three) times a day as needed for muscle spasms May cause drowsiness, Disp: 30 tablet, Rfl: 0    EPINEPHrine (ADRENALIN) 0 1 % nasal solution, 0 5 mL into each nostril as needed, Disp: , Rfl:     etanercept (ENBREL SURECLICK) 50 MG/ML injection, Inject 1 mL (50 mg total) under the skin once a week, Disp: 12 Syringe, Rfl: 3    folic acid (FOLVITE) 1 mg tablet, Take by mouth daily, Disp: , Rfl:     gabapentin (NEURONTIN) 300 mg capsule, Take 1 capsule (300 mg total) by mouth 3 (three) times a day, Disp: 90 capsule, Rfl: 1    levocetirizine (XYZAL) 5 MG tablet, Take 5 mg by mouth daily as needed for allergies, Disp: , Rfl:     methotrexate 2 5 mg tablet, Take 6 tablets (15 mg total) by mouth once a week, Disp: 72 tablet, Rfl: 1    naproxen (NAPROSYN) 500 mg tablet, Take 1 tablet (500 mg total) by mouth 2 (two) times a day with meals, Disp: 20 tablet, Rfl: 0    phentermine (ADIPEX-P) 37 5 MG tablet, TAKE 1 TABLET BY MOUTH EVERY DAY, Disp: 30 tablet, Rfl: 0    sertraline (ZOLOFT) 100 mg tablet, Take 1 5 tablets (150 mg total) by mouth daily, Disp: 135 tablet, Rfl: 3    Triamcinolone Acetonide (NASACORT ALLERGY 24HR) 55 MCG/ACT AERO, 2 sprays each nostril daily, Disp: 16 5 g, Rfl: 3    zolpidem (AMBIEN) 10 mg tablet, TAKE 1 TABLET BY MOUTH DAILY AT BEDTIME AS NEEDED FOR SLEEP, Disp: 30 tablet, Rfl: 0      Objective:    Vitals:    09/21/20 1504   BP: 105/72   BP Location: Left arm   Patient Position: Sitting   Cuff Size: Adult   Pulse: 90   Temp: 98 7 °F (37 1 °C)       Physical Exam  General: Well appearing, well nourished, in no distress  Oriented x 3, normal mood and affect  Ambulating without difficulty  Skin: Good turgor, no rash, unusual bruising or prominent lesions  Hair: Normal texture and distribution  Nails: Normal color, no deformities  HEENT:  Head: Normocephalic, atraumatic  Eyes: Conjunctiva clear, sclera non-icteric, EOM intact  Extremities: No amputations or deformities, cyanosis, edema  Musculoskeletal:  There is no peripheral joint soft tissue swelling or tenderness noted  Neurologic: Alert and oriented  No focal neurological deficits appreciated  Psychiatric: Normal mood and affect  FELIPE De Leon    Rheumatology

## 2020-09-21 ENCOUNTER — OFFICE VISIT (OUTPATIENT)
Dept: RHEUMATOLOGY | Facility: CLINIC | Age: 60
End: 2020-09-21
Payer: COMMERCIAL

## 2020-09-21 VITALS — HEART RATE: 90 BPM | DIASTOLIC BLOOD PRESSURE: 72 MMHG | TEMPERATURE: 98.7 F | SYSTOLIC BLOOD PRESSURE: 105 MMHG

## 2020-09-21 DIAGNOSIS — M06.09 RHEUMATOID ARTHRITIS OF MULTIPLE SITES WITH NEGATIVE RHEUMATOID FACTOR (HCC): Primary | ICD-10-CM

## 2020-09-21 DIAGNOSIS — M47.816 OSTEOARTHRITIS OF LUMBAR SPINE, UNSPECIFIED SPINAL OSTEOARTHRITIS COMPLICATION STATUS: ICD-10-CM

## 2020-09-21 DIAGNOSIS — Z79.899 LONG-TERM USE OF IMMUNOSUPPRESSANT MEDICATION: ICD-10-CM

## 2020-09-21 DIAGNOSIS — M85.80 OSTEOPENIA, UNSPECIFIED LOCATION: ICD-10-CM

## 2020-09-21 DIAGNOSIS — Z79.899 METHOTREXATE, LONG TERM, CURRENT USE: ICD-10-CM

## 2020-09-21 PROCEDURE — 99214 OFFICE O/P EST MOD 30 MIN: CPT | Performed by: INTERNAL MEDICINE

## 2020-09-25 ENCOUNTER — CONSULT (OUTPATIENT)
Dept: PAIN MEDICINE | Facility: CLINIC | Age: 60
End: 2020-09-25
Payer: COMMERCIAL

## 2020-09-25 VITALS
HEART RATE: 87 BPM | HEIGHT: 66 IN | TEMPERATURE: 97.4 F | DIASTOLIC BLOOD PRESSURE: 67 MMHG | BODY MASS INDEX: 24.86 KG/M2 | SYSTOLIC BLOOD PRESSURE: 100 MMHG

## 2020-09-25 DIAGNOSIS — M54.16 RADICULOPATHY, LUMBAR REGION: Primary | ICD-10-CM

## 2020-09-25 DIAGNOSIS — M51.26 LUMBAR DISC HERNIATION: ICD-10-CM

## 2020-09-25 DIAGNOSIS — M47.816 LUMBAR SPONDYLOSIS: ICD-10-CM

## 2020-09-25 PROCEDURE — 1036F TOBACCO NON-USER: CPT | Performed by: ANESTHESIOLOGY

## 2020-09-25 PROCEDURE — 99244 OFF/OP CNSLTJ NEW/EST MOD 40: CPT | Performed by: ANESTHESIOLOGY

## 2020-09-25 NOTE — PROGRESS NOTES
Assessment  1  Radiculopathy, lumbar region    2  Lumbar disc herniation    3  Lumbar spondylosis        Plan  43-year-old female with a history of RA referred by Dr Kannan Tavarez, presenting for initial consultation regarding a roughly 3 month history of lumbosacral back pain with radiculopathy in the L5-S1 distribution of the right lower extremity  She will occasionally experience some tingling in the toes on the left, however this is quite mild  Pain occurred when her dog ran into her leg roughly 3 months ago  MRI of the lumbar spine reveals disc herniations at L2-3 and L4-5 eccentric to the left and disc herniation L5-S1 eccentric to the right  Patient also noted to have facet arthropathy  The patient has been doing a home exercise program with mild relief  She has recently started gabapentin and is currently taking 300 mg t i d  With mild to moderate relief  She is unable to tolerate NSAIDs secondary to GI upset  Tylenol is minimally effective  Patient's symptoms are consistent with right L5-S1 radiculopathy likely stemming from disc herniation at L5-S1  Fortunately reflexes and strength are preserved  1  I will schedule the patient for right L5 and S1 TFESI to reduce the inflammatory component of her pain  2  Patient will continue with gabapentin as prescribed  3  Patient will continue with her home exercise program  4  I will follow up the patient in 4 weeks        Complete risks and benefits including bleeding, infection, tissue reaction, nerve injury and allergic reaction were discussed  The approach was demonstrated using models and literature was provided  Verbal and written consent was obtained  My impressions and treatment recommendations were discussed in detail with the patient who verbalized understanding and had no further questions  Discharge instructions were provided  I personally saw and examined the patient and I agree with the above discussed plan of care      No orders of the defined types were placed in this encounter  No orders of the defined types were placed in this encounter  History of Present Illness    Hermelinda Song is a 61 y o  female with a history of RA referred by Dr Spenser Gibbs, presenting for initial consultation regarding a roughly 3 month history of lumbosacral back pain with radiation into the posterolateral aspect of the right lower extremity with associated numbness and paresthesias  She denies any subjective weakness of the right leg  She will occasionally experience some tingling in the toes on the left, however this is quite mild  Pain occurred when her dog ran into her leg roughly 3 months ago  MRI of the lumbar spine reveals disc herniations at L2-3 and L4-5 eccentric to the left and disc herniation L5-S1 eccentric to the right  Patient also noted to have facet arthropathy  The patient has been doing a home exercise program with mild relief  She has recently started gabapentin and is currently taking 300 mg t i d  With mild to moderate relief  She is unable to tolerate NSAIDs secondary to GI upset  Tylenol is minimally effective  The patient rates her pain as 6/10 on the pain is nearly constant  The pain is not follow any particular pattern throughout the day  The pain is described as burning, cramping, numbness, sharp, and pins and needles  The pain is increased with standing, sitting, walking, exercise, and bowel movements  She gets some relief with heat and ice application and chiropractic treatment  Other than as stated above, the patient denies any interval changes in medications, medical condition, mental condition, symptoms, or allergies since the last office visit  I have personally reviewed and/or updated the patient's past medical history, past surgical history, family history, social history, current medications, allergies, and vital signs today       Review of Systems   Constitutional: Negative for fever and unexpected weight change  HENT: Negative for trouble swallowing  Eyes: Negative for visual disturbance  Respiratory: Negative for shortness of breath and wheezing  Cardiovascular: Negative for chest pain and palpitations  Gastrointestinal: Negative for constipation, diarrhea, nausea and vomiting  Endocrine: Negative for cold intolerance, heat intolerance and polydipsia  Genitourinary: Negative for difficulty urinating and frequency  Musculoskeletal: Positive for joint swelling  Negative for arthralgias, gait problem and myalgias  Skin: Negative for rash  Neurological: Positive for numbness  Negative for dizziness, seizures, syncope, weakness and headaches  Hematological: Does not bruise/bleed easily  Psychiatric/Behavioral: Negative for dysphoric mood  All other systems reviewed and are negative        Patient Active Problem List   Diagnosis    Rheumatoid arthritis of multiple sites with negative rheumatoid factor (HCC)    Primary insomnia    Recurrent major depressive disorder, in full remission ([facility] [address] [facility] )       Past Medical History:   Diagnosis Date    Allergic rhinitis     Anxiety     Depression     Osteoporosis     Rheumatoid arthritis ([facility] [facility] )        Past Surgical History:   Procedure Laterality Date    AUGMENTATION MAMMAPLASTY Bilateral 2010    BREAST BIOPSY Right 2008    benign    KNEE SURGERY      SINUS SURGERY         Family History   Problem Relation Age of Onset    Hypertension Mother     Cancer Mother     Stroke Mother     Crohn's disease Mother     Hyperlipidemia Mother     Heart attack Mother    [facility] Breast cancer Mother 79    Hypertension Father     Cancer Father     Stroke Father     Hyperlipidemia Father     Heart attack Father     Hyperlipidemia Sister     Hyperlipidemia Brother        Social History     Occupational History    Not on file   Tobacco Use    Smoking status: Never Smoker    Smokeless tobacco: Never Used   Substance and Sexual Activity    Alcohol use: Yes     Alcohol/week: 2 0 standard drinks     Types: 2 Glasses of wine per week     Frequency: Monthly or less     Drinks per session: 1 or 2     Binge frequency: Never    Drug use: Not Currently    Sexual activity: Not on file       Current Outpatient Medications on File Prior to Visit   Medication Sig    buPROPion (WELLBUTRIN XL) 300 mg 24 hr tablet Take 1 tablet (300 mg total) by mouth every morning    cyclobenzaprine (FLEXERIL) 5 mg tablet Take 1 tablet (5 mg total) by mouth 3 (three) times a day as needed for muscle spasms May cause drowsiness    EPINEPHrine (ADRENALIN) 0 1 % nasal solution 0 5 mL into each nostril as needed    etanercept (ENBREL SURECLICK) 50 MG/ML injection Inject 1 mL (50 mg total) under the skin once a week    folic acid (FOLVITE) 1 mg tablet Take by mouth daily    gabapentin (NEURONTIN) 300 mg capsule Take 1 capsule (300 mg total) by mouth 3 (three) times a day    levocetirizine (XYZAL) 5 MG tablet Take 5 mg by mouth daily as needed for allergies    methotrexate 2 5 mg tablet Take 6 tablets (15 mg total) by mouth once a week    naproxen (NAPROSYN) 500 mg tablet Take 1 tablet (500 mg total) by mouth 2 (two) times a day with meals    phentermine (ADIPEX-P) 37 5 MG tablet TAKE 1 TABLET BY MOUTH EVERY DAY    sertraline (ZOLOFT) 100 mg tablet Take 1 5 tablets (150 mg total) by mouth daily    Triamcinolone Acetonide (NASACORT ALLERGY 24HR) 55 MCG/ACT AERO 2 sprays each nostril daily    zolpidem (AMBIEN) 10 mg tablet TAKE 1 TABLET BY MOUTH DAILY AT BEDTIME AS NEEDED FOR SLEEP     No current facility-administered medications on file prior to visit  Allergies   Allergen Reactions    Plaquenil [Hydroxychloroquine] Rash       Physical Exam    There were no vitals taken for this visit  Constitutional: normal, well developed, well nourished, alert, in no distress and non-toxic and no overt pain behavior    Eyes: anicteric  HEENT: grossly intact  Neck: supple, symmetric, trachea midline and no masses   Pulmonary:even and unlabored  Cardiovascular:No edema or pitting edema present  Skin:Normal without rashes or lesions and well hydrated  Psychiatric:Mood and affect appropriate  Neurologic:Cranial Nerves II-XII grossly intact  Musculoskeletal:normal gait  Bilateral lumbar paraspinals tender to palpation from L4-L5  Bilateral SI joints mildly tender to palpation  Bilateral patellar and Achilles reflexes were 2/4 and symmetrical   No clonus was noted bilaterally  Bilateral lower extremity strength 5/5 in all muscle groups  Sensation diminished to light touch in the L5 distribution of the right leg  Positive straight leg raise bilaterally  Negative Wilber's test bilaterally  Imaging        PACS Images      Show images for XR spine lumbar 2 or 3 views injury    Study Result     LUMBAR SPINE     INDICATION:   T14 90XA: Injury, unspecified, initial encounter      COMPARISON:  None     VIEWS:  XR SPINE LUMBAR 2 OR 3 VIEWS INJURY        FINDINGS:     There are 5 non rib bearing lumbar vertebral bodies       There is no evidence of acute fracture or destructive osseous lesion      Alignment is unremarkable       Mild diffuse disc space height loss and tiny scattered marginal endplate osteophytes      The pedicles appear intact      Soft tissues are unremarkable      IMPRESSION:     Mild degenerative changes  No acute deformity         Workstation performed: [id number]           PACS Images      Show images for DXA bone density spine hip and pelvis    Study Result     CENTRAL  DXA SCAN     CLINICAL HISTORY: 24-year-old woman  Menopause at age 52  OTHER RISK FACTORS: Parental history of hip fracture  Rheumatoid arthritis      TECHNIQUE: Bone densitometry was performed using a [employer]  bone densitometer    Regions of interest appear properly placed       COMPARISON: There are no prior DXA studies performed on this unit for comparison       RESULTS:      LUMBAR SPINE L1-L4 : BMD  0 939  gm/cm2 / T-score -2 1 / Z score -0  9         LEFT TOTAL HIP: BMD 0 787  gm/cm2 / T-score -1 7 / Z score -0 9  LEFT FEMORAL NECK: BMD 0 788  gm/cm2 / T score -1 8 / Z score -0 6     RIGHT TOTAL HIP: BMD  0 764  gm/cm2 / T-score -1 9 / Z score -1 0  RIGHT FEMORAL NECK: BMD  0 736  gm/cm2 / T score -2 2 / Z score -1 0        IMPRESSION:  1  Low bone mass (osteopenia)      2  The 10 year risk of hip fracture is 2 2% with the 10 year risk of major osteoporotic fracture being 23 8% as calculated by the [facility]/WHO fracture risk assessment tool (FRAX)     3  The current NOF guidelines recommend treating patients with a T-score of -2 5 or less in the lumbar spine or hips, or in post-menopausal women and men over the age of 48 with low bone mass (osteopenia) and a FRAX 10 year risk score of >3% for hip   fracture and/or >20% for major osteoporotic fracture      4  The NOF recommends follow-up DXA in 1-2 years after initiating therapy for osteoporosis and every 2 years thereafter  More frequent evaluation is appropriate for patients with conditions associated with rapid bone loss, such as glucocorticoid   therapy  The interval between DXA screenings may be longer for individuals without major risk factors and initial T-score in the normal or upper low bone mass range         The FRAX algorithm has certain limitations:  -FRAX has not been validated in patients currently or previously treated with pharmacotherapy for osteoporosis  In such patients, clinical judgment must be exercised in interpreting FRAX scores  -Prior hip, vertebral and humeral fragility fractures appear to confer greater risk of subsequent fracture than fractures at other sites (this is especially true for individuals with severe vertebral fractures), but quantification of this incremental   risk is not possible with FRAX    -FRAX underestimates fracture risk in patients with history of multiple fragility fractures    -FRAX may underestimate fracture risk in patients with history of frequent falls   -It is not appropriate to use FRAX to monitor treatment response         WHO CLASSIFICATION:  Normal (a T-score of -1 0 or higher)  Low bone mineral density (a T-score of less than -1 0 but higher than -2 5)  Osteoporosis (a T-score of -2 5 or less)  Severe osteoporosis (a T-score of -2 5 or less with a fragility fracture)                 Workstation performed: [id number]     PACS Images      Show images for MRI outside images    Study Result     1 2 392 [id number] 100 12 11 [id number]   Imaging     MRI outside images (Order: [id number]) - 8/28/2020   Order Report      Order Details

## 2020-09-25 NOTE — PATIENT INSTRUCTIONS
Epidural Steroid Injection   WHAT YOU NEED TO KNOW:   What do I need to know about an epidural steroid injection (TONYA)? An TONYA is a procedure to inject steroid medicine into the epidural space  The epidural space is between your spinal cord and vertebrae  Steroids reduce inflammation and fluid buildup in your spine that may be causing pain  You may be given pain medicine along with the steroids  How do I prepare for an TONYA? Your healthcare provider will talk to you about how to prepare for your procedure  He will tell you what medicines to take or not take on the day of your procedure  You may need to stop taking blood thinners or other medicines several days before your procedure  You may need to adjust any diabetes medicine you take on the day of your procedure  Steroid medicine can increase your blood sugar level  What will happen during an TONYA? · You will be given medicine to numb the procedure area  You will be awake for the procedure, but you will not feel pain  You may also be given medicine to help you relax during the procedure  Contrast liquid will be used to help your healthcare provider see the area better  Tell the healthcare provider if you have ever had an allergic reaction to contrast liquid  · Your healthcare provider may place the needle into your neck area, middle of your back, or tailbone area  He may inject the medicine next to the nerves that are causing your pain  He may instead inject the medicine into a larger area of the epidural space  This helps the medicine spread to more nerves  Your healthcare provider will use a fluoroscope to help guide the needle to the right place  A fluoroscope is a type of x-ray  After the procedure, a bandage will be placed over the injection site to prevent infection  What are the risks of an TONYA? You may have temporary or permanent nerve damage or paralysis   You may have bleeding or develop a serious infection, such as meningitis (swelling of the brain coverings)  An abscess may also develop  An abscess is a pus-filled area under the skin  You may need surgery to fix the abscess  You may have a seizure, anxiety, or trouble sleeping  If you are a man, you may have temporary erectile dysfunction (not able to have an erection)  CARE AGREEMENT:   You have the right to help plan your care  Learn about your health condition and how it may be treated  Discuss treatment options with your caregivers to decide what care you want to receive  You always have the right to refuse treatment  The above information is an  only  It is not intended as medical advice for individual conditions or treatments  Talk to your doctor, nurse or pharmacist before following any medical regimen to see if it is safe and effective for you  © 2017 [facility] Information is for End User's use only and may not be sold, redistributed or otherwise used for commercial purposes  All illustrations and images included in CareNotes® are the copyrighted property of A D [employer] M [employer]  or [employer]

## 2020-10-01 DIAGNOSIS — M06.9 RHEUMATOID ARTHRITIS INVOLVING MULTIPLE SITES (HCC): ICD-10-CM

## 2020-10-01 RX ORDER — ETANERCEPT 50 MG/ML
SOLUTION SUBCUTANEOUS
Qty: 4 SYRINGE | Refills: 5 | Status: SHIPPED | OUTPATIENT
Start: 2020-10-01 | End: 2021-06-10 | Stop reason: SDUPTHER

## 2020-10-07 DIAGNOSIS — F51.01 PRIMARY INSOMNIA: ICD-10-CM

## 2020-10-09 RX ORDER — ZOLPIDEM TARTRATE 10 MG/1
TABLET ORAL
Qty: 30 TABLET | Refills: 0 | Status: SHIPPED | OUTPATIENT
Start: 2020-10-09 | End: 2020-11-09

## 2020-10-13 ENCOUNTER — HOSPITAL ENCOUNTER (OUTPATIENT)
Dept: RADIOLOGY | Facility: CLINIC | Age: 60
Discharge: HOME | End: 2020-10-13
Attending: ANESTHESIOLOGY | Admitting: ANESTHESIOLOGY
Payer: COMMERCIAL

## 2020-10-13 VITALS
RESPIRATION RATE: 18 BRPM | OXYGEN SATURATION: 98 % | SYSTOLIC BLOOD PRESSURE: 104 MMHG | DIASTOLIC BLOOD PRESSURE: 71 MMHG | HEART RATE: 82 BPM | TEMPERATURE: 98.3 F

## 2020-10-13 DIAGNOSIS — M54.16 RADICULOPATHY, LUMBAR REGION: ICD-10-CM

## 2020-10-13 PROCEDURE — 64483 NJX AA&/STRD TFRM EPI L/S 1: CPT | Performed by: ANESTHESIOLOGY

## 2020-10-13 PROCEDURE — 64484 NJX AA&/STRD TFRM EPI L/S EA: CPT | Performed by: ANESTHESIOLOGY

## 2020-10-13 RX ORDER — PAPAVERINE HCL 150 MG
20 CAPSULE, EXTENDED RELEASE ORAL ONCE
Status: COMPLETED | OUTPATIENT
Start: 2020-10-13 | End: 2020-10-13

## 2020-10-13 RX ORDER — LIDOCAINE HYDROCHLORIDE 10 MG/ML
5 INJECTION, SOLUTION EPIDURAL; INFILTRATION; INTRACAUDAL; PERINEURAL ONCE
Status: COMPLETED | OUTPATIENT
Start: 2020-10-13 | End: 2020-10-13

## 2020-10-13 RX ADMIN — IOHEXOL 1 ML: 300 INJECTION, SOLUTION INTRAVENOUS at 10:27

## 2020-10-13 RX ADMIN — LIDOCAINE HYDROCHLORIDE 4 ML: 10 INJECTION, SOLUTION EPIDURAL; INFILTRATION; INTRACAUDAL; PERINEURAL at 10:25

## 2020-10-13 RX ADMIN — LIDOCAINE HYDROCHLORIDE 2 ML: 20 INJECTION, SOLUTION EPIDURAL; INFILTRATION; INTRACAUDAL; PERINEURAL at 10:28

## 2020-10-13 RX ADMIN — DEXAMETHASONE SODIUM PHOSPHATE 15 MG: 10 INJECTION, SOLUTION INTRAMUSCULAR; INTRAVENOUS at 10:28

## 2020-10-20 ENCOUNTER — TELEPHONE (OUTPATIENT)
Dept: PAIN MEDICINE | Facility: CLINIC | Age: 60
End: 2020-10-20

## 2020-11-08 DIAGNOSIS — F51.01 PRIMARY INSOMNIA: ICD-10-CM

## 2020-11-09 RX ORDER — ZOLPIDEM TARTRATE 10 MG/1
TABLET ORAL
Qty: 30 TABLET | Refills: 0 | Status: SHIPPED | OUTPATIENT
Start: 2020-11-09 | End: 2020-12-11

## 2020-11-30 DIAGNOSIS — E66.3 OVERWEIGHT: ICD-10-CM

## 2020-11-30 RX ORDER — PHENTERMINE HYDROCHLORIDE 37.5 MG/1
TABLET ORAL
Qty: 75 TABLET | Refills: 0 | Status: SHIPPED | OUTPATIENT
Start: 2020-11-30 | End: 2021-03-10 | Stop reason: ALTCHOICE

## 2020-12-10 DIAGNOSIS — F51.01 PRIMARY INSOMNIA: ICD-10-CM

## 2020-12-11 RX ORDER — ZOLPIDEM TARTRATE 10 MG/1
TABLET ORAL
Qty: 30 TABLET | Refills: 0 | Status: SHIPPED | OUTPATIENT
Start: 2020-12-11 | End: 2021-01-13

## 2020-12-22 LAB
ALBUMIN SERPL-MCNC: 4.3 G/DL (ref 3.8–4.9)
ALBUMIN/GLOB SERPL: 2 {RATIO} (ref 1.2–2.2)
ALP SERPL-CCNC: 122 IU/L (ref 39–117)
ALT SERPL-CCNC: 16 IU/L (ref 0–32)
AST SERPL-CCNC: 27 IU/L (ref 0–40)
BASOPHILS # BLD AUTO: 0 X10E3/UL (ref 0–0.2)
BASOPHILS NFR BLD AUTO: 0 %
BILIRUB SERPL-MCNC: 0.2 MG/DL (ref 0–1.2)
BUN SERPL-MCNC: 16 MG/DL (ref 8–27)
BUN/CREAT SERPL: 18 (ref 12–28)
CALCIUM SERPL-MCNC: 9.5 MG/DL (ref 8.7–10.3)
CHLORIDE SERPL-SCNC: 104 MMOL/L (ref 96–106)
CO2 SERPL-SCNC: 22 MMOL/L (ref 20–29)
CREAT SERPL-MCNC: 0.88 MG/DL (ref 0.57–1)
EOSINOPHIL # BLD AUTO: 0.1 X10E3/UL (ref 0–0.4)
EOSINOPHIL NFR BLD AUTO: 1 %
ERYTHROCYTE [DISTWIDTH] IN BLOOD BY AUTOMATED COUNT: 12.1 % (ref 11.7–15.4)
GLOBULIN SER-MCNC: 2.2 G/DL (ref 1.5–4.5)
GLUCOSE SERPL-MCNC: 99 MG/DL (ref 65–99)
HCT VFR BLD AUTO: 35.5 % (ref 34–46.6)
HGB BLD-MCNC: 11.9 G/DL (ref 11.1–15.9)
IMM GRANULOCYTES # BLD: 0 X10E3/UL (ref 0–0.1)
IMM GRANULOCYTES NFR BLD: 0 %
LYMPHOCYTES # BLD AUTO: 1.9 X10E3/UL (ref 0.7–3.1)
LYMPHOCYTES NFR BLD AUTO: 35 %
MCH RBC QN AUTO: 31.6 PG (ref 26.6–33)
MCHC RBC AUTO-ENTMCNC: 33.5 G/DL (ref 31.5–35.7)
MCV RBC AUTO: 94 FL (ref 79–97)
MONOCYTES # BLD AUTO: 0.5 X10E3/UL (ref 0.1–0.9)
MONOCYTES NFR BLD AUTO: 9 %
NEUTROPHILS # BLD AUTO: 2.9 X10E3/UL (ref 1.4–7)
NEUTROPHILS NFR BLD AUTO: 55 %
PLATELET # BLD AUTO: 290 X10E3/UL (ref 150–450)
POTASSIUM SERPL-SCNC: 3.7 MMOL/L (ref 3.5–5.2)
PROT SERPL-MCNC: 6.5 G/DL (ref 6–8.5)
RBC # BLD AUTO: 3.77 X10E6/UL (ref 3.77–5.28)
SL AMB EGFR AFRICAN AMERICAN: 83 ML/MIN/1.73
SL AMB EGFR NON AFRICAN AMERICAN: 72 ML/MIN/1.73
SODIUM SERPL-SCNC: 140 MMOL/L (ref 134–144)
WBC # BLD AUTO: 5.3 X10E3/UL (ref 3.4–10.8)

## 2020-12-30 ENCOUNTER — TELEPHONE (OUTPATIENT)
Dept: FAMILY MEDICINE CLINIC | Facility: CLINIC | Age: 60
End: 2020-12-30

## 2020-12-30 NOTE — TELEPHONE ENCOUNTER
Patient called stating she thinks she has a sinus infection  She has green mucus, headaches, cough, no fever for 3 days  I tried to schedule her for a virtual visit there are no openings             Pharmacy [facility] listed in chart

## 2020-12-31 DIAGNOSIS — J01.00 ACUTE NON-RECURRENT MAXILLARY SINUSITIS: Primary | ICD-10-CM

## 2020-12-31 RX ORDER — AMOXICILLIN AND CLAVULANATE POTASSIUM 875; 125 MG/1; MG/1
1 TABLET, FILM COATED ORAL EVERY 12 HOURS SCHEDULED
Qty: 14 TABLET | Refills: 0 | Status: SHIPPED | OUTPATIENT
Start: 2020-12-31 | End: 2021-11-29 | Stop reason: SDUPTHER

## 2020-12-31 NOTE — TELEPHONE ENCOUNTER
Pt called again for this task domenic had sent but wasn't addressed maybe it didn't get to you [facility] [address] [facility] [place] pls advise

## 2020-12-31 NOTE — TELEPHONE ENCOUNTER
I just got this task just now  I sent over antibiotics for her   Please explain this to the patient the message didn't get through to me

## 2021-01-13 DIAGNOSIS — F51.01 PRIMARY INSOMNIA: ICD-10-CM

## 2021-01-13 RX ORDER — ZOLPIDEM TARTRATE 10 MG/1
TABLET ORAL
Qty: 30 TABLET | Refills: 0 | Status: SHIPPED | OUTPATIENT
Start: 2021-01-13 | End: 2021-02-12

## 2021-01-21 DIAGNOSIS — F33.42 RECURRENT MAJOR DEPRESSIVE DISORDER, IN FULL REMISSION (HCC): ICD-10-CM

## 2021-01-22 RX ORDER — BUPROPION HYDROCHLORIDE 300 MG/1
TABLET ORAL
Qty: 90 TABLET | Refills: 3 | Status: SHIPPED | OUTPATIENT
Start: 2021-01-22 | End: 2021-07-26

## 2021-01-22 RX ORDER — SERTRALINE HYDROCHLORIDE 100 MG/1
TABLET, FILM COATED ORAL
Qty: 135 TABLET | Refills: 3 | Status: SHIPPED | OUTPATIENT
Start: 2021-01-22 | End: 2021-07-26

## 2021-01-29 ENCOUNTER — TELEPHONE (OUTPATIENT)
Dept: RHEUMATOLOGY | Facility: CLINIC | Age: 61
End: 2021-01-29

## 2021-01-29 DIAGNOSIS — M06.09 RHEUMATOID ARTHRITIS OF MULTIPLE SITES WITH NEGATIVE RHEUMATOID FACTOR (HCC): ICD-10-CM

## 2021-02-12 DIAGNOSIS — F51.01 PRIMARY INSOMNIA: ICD-10-CM

## 2021-02-12 RX ORDER — ZOLPIDEM TARTRATE 10 MG/1
TABLET ORAL
Qty: 30 TABLET | Refills: 0 | Status: SHIPPED | OUTPATIENT
Start: 2021-02-12 | End: 2021-03-16

## 2021-03-10 ENCOUNTER — OFFICE VISIT (OUTPATIENT)
Dept: FAMILY MEDICINE CLINIC | Facility: CLINIC | Age: 61
End: 2021-03-10
Payer: COMMERCIAL

## 2021-03-10 VITALS
SYSTOLIC BLOOD PRESSURE: 102 MMHG | HEART RATE: 98 BPM | WEIGHT: 152 LBS | HEIGHT: 65 IN | TEMPERATURE: 98.9 F | BODY MASS INDEX: 25.33 KG/M2 | DIASTOLIC BLOOD PRESSURE: 72 MMHG | OXYGEN SATURATION: 98 %

## 2021-03-10 DIAGNOSIS — F33.42 RECURRENT MAJOR DEPRESSIVE DISORDER, IN FULL REMISSION (HCC): ICD-10-CM

## 2021-03-10 DIAGNOSIS — M06.09 RHEUMATOID ARTHRITIS OF MULTIPLE SITES WITH NEGATIVE RHEUMATOID FACTOR (HCC): ICD-10-CM

## 2021-03-10 DIAGNOSIS — Z23 NEED FOR SHINGLES VACCINE: ICD-10-CM

## 2021-03-10 DIAGNOSIS — Z23 ENCOUNTER FOR IMMUNIZATION: ICD-10-CM

## 2021-03-10 DIAGNOSIS — K44.9 HIATAL HERNIA: ICD-10-CM

## 2021-03-10 DIAGNOSIS — M54.16 RADICULOPATHY, LUMBAR REGION: ICD-10-CM

## 2021-03-10 DIAGNOSIS — R10.13 EPIGASTRIC PAIN: Primary | ICD-10-CM

## 2021-03-10 DIAGNOSIS — R07.9 CHEST PAIN, UNSPECIFIED TYPE: ICD-10-CM

## 2021-03-10 PROCEDURE — 93000 ELECTROCARDIOGRAM COMPLETE: CPT | Performed by: FAMILY MEDICINE

## 2021-03-10 PROCEDURE — 3725F SCREEN DEPRESSION PERFORMED: CPT | Performed by: FAMILY MEDICINE

## 2021-03-10 PROCEDURE — 90471 IMMUNIZATION ADMIN: CPT | Performed by: FAMILY MEDICINE

## 2021-03-10 PROCEDURE — 99214 OFFICE O/P EST MOD 30 MIN: CPT | Performed by: FAMILY MEDICINE

## 2021-03-10 PROCEDURE — 90750 HZV VACC RECOMBINANT IM: CPT | Performed by: FAMILY MEDICINE

## 2021-03-10 RX ORDER — OMEPRAZOLE 40 MG/1
40 CAPSULE, DELAYED RELEASE ORAL DAILY
Qty: 30 CAPSULE | Refills: 0 | Status: SHIPPED | OUTPATIENT
Start: 2021-03-10 | End: 2021-03-18 | Stop reason: SDUPTHER

## 2021-03-10 NOTE — PROGRESS NOTES
Assessment/Plan:    No problem-specific Assessment & Plan notes found for this encounter  Diagnoses and all orders for this visit:    Epigastric pain  -     H  pylori antigen, stool; Future  -     omeprazole (PriLOSEC) 40 MG capsule; Take 1 capsule (40 mg total) by mouth daily  -     US abdomen complete; Future  -     Ambulatory referral to Gastroenterology; Future    Chest pain, unspecified type  Comments:  normal EKG today   its more of GI etiology  Orders:  -     POCT ECG    Recurrent major depressive disorder, in full remission ([facility] )  Comments:  doing well on current meds    Rheumatoid arthritis of multiple sites with negative rheumatoid factor ([facility] )  Comments:  stable  care per rheumatologist     Radiculopathy, lumbar region  Comments:  stable  care per pain management     Hiatal hernia  -     Ambulatory referral to Gastroenterology; Future          Subjective:      Patient ID: Aníbal Orourke is a 61 y o  female  Pain in mid abdomen for the last 5 days   Food makes it worse  + acid reflux in the past   Took a dose of omeprazole yesterday which didn't help  No change in diet   + hiatal hernia on endoscopy on 5/10/2012    Chest Pain   This is a new problem  The current episode started in the past 7 days  The problem occurs intermittently  The pain is mild  The quality of the pain is described as pressure  The pain radiates to the mid back  Associated symptoms include abdominal pain  Pertinent negatives include no back pain, claudication, cough, diaphoresis, dizziness, exertional chest pressure, fever, headaches, hemoptysis, irregular heartbeat, leg pain, lower extremity edema, malaise/fatigue, nausea, near-syncope, numbness, orthopnea, palpitations, PND, shortness of breath, sputum production, syncope, vomiting or weakness         The following portions of the patient's history were reviewed and updated as appropriate: allergies, current medications, past family history, past medical history, past social history, past surgical history and problem list     Review of Systems   Constitutional: Negative for diaphoresis, fever and malaise/fatigue  Respiratory: Negative for cough, hemoptysis, sputum production and shortness of breath  Cardiovascular: Positive for chest pain  Negative for palpitations, orthopnea, claudication, syncope, PND and near-syncope  Gastrointestinal: Positive for abdominal pain  Negative for nausea and vomiting  Musculoskeletal: Negative for back pain  Neurological: Negative for dizziness, weakness, numbness and headaches  Objective:      /72 (BP Location: Left arm, Patient Position: Sitting, Cuff Size: Adult)   Pulse 98   Temp 98 9 °F (37 2 °C) (Tympanic)   Ht 5' 4 88" (1 648 m)   Wt 68 9 kg (152 lb)   SpO2 98%   BMI 25 39 kg/m²          Physical Exam  Constitutional:       Appearance: She is well-developed  Cardiovascular:      Rate and Rhythm: Normal rate and regular rhythm  Pulmonary:      Effort: Pulmonary effort is normal       Breath sounds: Normal breath sounds  Abdominal:      General: Bowel sounds are normal  There is no abdominal bruit  Palpations: There is no fluid wave, hepatomegaly, splenomegaly or mass  Tenderness: There is abdominal tenderness  There is no guarding or rebound  Comments: Epigastric region   Skin:     Capillary Refill: Capillary refill takes less than 2 seconds  Findings: No ecchymosis, erythema or rash  Nails: There is no clubbing  Neurological:      General: No focal deficit present  Mental Status: She is alert

## 2021-03-16 DIAGNOSIS — F51.01 PRIMARY INSOMNIA: ICD-10-CM

## 2021-03-16 RX ORDER — ZOLPIDEM TARTRATE 10 MG/1
TABLET ORAL
Qty: 30 TABLET | Refills: 0 | Status: SHIPPED | OUTPATIENT
Start: 2021-03-16 | End: 2021-04-13

## 2021-03-18 ENCOUNTER — CONSULT (OUTPATIENT)
Dept: GASTROENTEROLOGY | Facility: CLINIC | Age: 61
End: 2021-03-18
Payer: COMMERCIAL

## 2021-03-18 ENCOUNTER — PREP FOR PROCEDURE (OUTPATIENT)
Dept: GASTROENTEROLOGY | Facility: CLINIC | Age: 61
End: 2021-03-18

## 2021-03-18 VITALS
HEART RATE: 85 BPM | HEIGHT: 65 IN | SYSTOLIC BLOOD PRESSURE: 111 MMHG | TEMPERATURE: 97.9 F | WEIGHT: 152 LBS | BODY MASS INDEX: 25.33 KG/M2 | DIASTOLIC BLOOD PRESSURE: 69 MMHG

## 2021-03-18 DIAGNOSIS — Z12.11 COLON CANCER SCREENING: Primary | ICD-10-CM

## 2021-03-18 DIAGNOSIS — Z20.822 ENCOUNTER FOR LABORATORY TESTING FOR COVID-19 VIRUS: ICD-10-CM

## 2021-03-18 DIAGNOSIS — K44.9 HIATAL HERNIA: ICD-10-CM

## 2021-03-18 DIAGNOSIS — R10.13 EPIGASTRIC PAIN: ICD-10-CM

## 2021-03-18 DIAGNOSIS — Z12.11 COLON CANCER SCREENING: ICD-10-CM

## 2021-03-18 PROCEDURE — 3008F BODY MASS INDEX DOCD: CPT | Performed by: INTERNAL MEDICINE

## 2021-03-18 PROCEDURE — 99244 OFF/OP CNSLTJ NEW/EST MOD 40: CPT | Performed by: INTERNAL MEDICINE

## 2021-03-18 RX ORDER — OMEPRAZOLE 40 MG/1
40 CAPSULE, DELAYED RELEASE ORAL DAILY
Qty: 30 CAPSULE | Refills: 3 | Status: SHIPPED | OUTPATIENT
Start: 2021-03-18 | End: 2021-07-07

## 2021-03-18 RX ORDER — SODIUM, POTASSIUM,MAG SULFATES 17.5-3.13G
1 SOLUTION, RECONSTITUTED, ORAL ORAL ONCE
Qty: 1 BOTTLE | Refills: 0 | Status: SHIPPED | OUTPATIENT
Start: 2021-03-18 | End: 2021-03-25

## 2021-03-18 NOTE — PROGRESS NOTES
Consultation - 126 [facility]  Hermelinda Song 61 y o  female MRN: [id number]          Assessment & Plan:     42-year-old female with history of rheumatoid arthritis, 2 weeks of severe postprandial epigastric pain, history of reflux esophagitis in the past   Due for colon cancer screening  1  Epigastric pain: Most likely secondary to reflux esophagitis versus peptic ulcer disease  -continue daily PPI therapy, refills were provided  - given reflux esophagitis in the past, severity of presentation we will proceed with an upper endoscopy to exclude peptic ulcer disease    2  Colon cancer screening:  Last examination was 9 years ago, she had a small polyp at that time, pathology is not available to us   - we will schedule colonoscopy the same time for colon cancer screening   -patient has longstanding mild constipation, we will make further recommendations after colonoscopy    Discussed with patient risks of procedures including bleeding, surgery, perforation,  Missed polyp detection rate             _____________________________________________________________        CC: Epigastric pain    HPI:  Hermelinda Song is a 61 y  o female who was referred for evaluation of  2 weeks of epigastric pain  As you know this is a pleasant 10year-old female with history of rheumatoid arthritis, she takes methotrexate, Enbrel  Has a history of reflux esophagitis and a small colon polyp on her last examination about 9 years ago in [place]  The pathology is not available to us  She has fairly regular bowel movements, occasionally has constipation with a bowel movement every 2 to 3 days but this has been her baseline  She takes a daily stool softener  Denies any melena, rectal bleeding, abdominal pain  Epigastric pain started 2 weeks ago, she was started on omeprazole, she reports over the past day she has had significant improvement pain  She denies any nausea, vomiting, dysphagia      The symptoms are separate or different for her prior reflux symptoms  No significant NSAID use, she had NSAIDs back in September which she has stopped taking after 1 dose  Family history is negative for GI or associated malignancies  Patient surgical history is noted for bilateral knee surgery, sinus surgery  She denies any tobacco, rarely drinks  Currently works in retail, previously was an educator  The relocated here from [place]  ROS:  The remainder of the ROS was negative except for the pertinent positives mentioned in HPI           Allergies: Plaquenil [hydroxychloroquine]    Medications:   Current Outpatient Medications:     buPROPion (WELLBUTRIN XL) 300 mg 24 hr tablet, TAKE 1 TABLET EVERY MORNING, Disp: 90 tablet, Rfl: 3    Enbrel SureClick 50 MG/ML injection, INJECT 1 PEN UNDER THE SKIN EVERY 7 DAYS , Disp: 4 Syringe, Rfl: 5    EPINEPHrine (ADRENALIN) 0 1 % nasal solution, 0 5 mL into each nostril as needed, Disp: , Rfl:     folic acid (FOLVITE) 1 mg tablet, Take by mouth daily, Disp: , Rfl:     levocetirizine (XYZAL) 5 MG tablet, Take 5 mg by mouth daily as needed for allergies, Disp: , Rfl:     methotrexate 2 5 mg tablet, Take 6 tablets (15 mg total) by mouth once a week, Disp: 72 tablet, Rfl: 1    omeprazole (PriLOSEC) 40 MG capsule, Take 1 capsule (40 mg total) by mouth daily, Disp: 30 capsule, Rfl: 3    sertraline (ZOLOFT) 100 mg tablet, TAKE 1 AND 1/2 TABLETS     (150MG TOTAL) DAILY, Disp: 135 tablet, Rfl: 3    Triamcinolone Acetonide (NASACORT ALLERGY 24HR) 55 MCG/ACT AERO, 2 sprays each nostril daily, Disp: 16 5 g, Rfl: 3    zolpidem (AMBIEN) 10 mg tablet, TAKE 1 TABLET BY MOUTH DAILY AT BEDTIME AS NEEDED FOR SLEEP, Disp: 30 tablet, Rfl: 0    Na Sulfate-K Sulfate-Mg Sulf (Suprep Bowel Prep Kit) 17 5-3 13-1 6 GM/177ML SOLN, Take 1 Bottle by mouth once for 1 dose, Disp: 1 Bottle, Rfl: 0'    Past Medical History:   Diagnosis Date    Allergic rhinitis     Anxiety  Depression     Lumbar spondylosis 9/25/2020    Osteoporosis     Rheumatoid arthritis ([facility] [address] )        Past Surgical History:   Procedure Laterality Date    AUGMENTATION MAMMAPLASTY Bilateral 2010    BREAST BIOPSY Right 2008    benign    KNEE SURGERY      SINUS SURGERY         Family History   Problem Relation Age of Onset    Hypertension Mother     Cancer Mother     Stroke Mother     Crohn's disease Mother     Hyperlipidemia Mother     Heart attack Mother     Breast cancer Mother 79    Hypertension Father     Cancer Father     Stroke Father     Hyperlipidemia Father     Heart attack Father     Hyperlipidemia Sister     Hyperlipidemia Brother         reports that she has never smoked  She has never used smokeless tobacco  She reports current alcohol use of about 2 0 standard drinks of alcohol per week  She reports previous drug use            Physical Exam:     /69 (BP Location: Right arm, Patient Position: Sitting, Cuff Size: Standard)   Pulse 85   Temp 97 9 °F (36 6 °C)   Ht 5' 4 88" (1 648 m)   Wt 68 9 kg (152 lb)   BMI 25 39 kg/m²     Gen: wn/wd, NAD , healthy-appearing female  HEENT: anicteric, MMM, no cervical LAD  CVS: RRR, no m/r/g  CHEST: CTA b/l  ABD: +BS, soft,  Mild epigastric discomfort with deep palpation no hepatosplenomegaly  EXT: no c/c/e  NEURO: aaox3  SKIN: NO rashes

## 2021-03-19 NOTE — PROGRESS NOTES
Assessment and Plan:   Ms Reagan Avilez a 49-year-old female with history significant for rheumatoid arthritis, osteopenia and lumbar degenerative arthritis, who presents for follow-up   She is currently on subcutaneous Enbrel 50 mg weekly and methotrexate 5-6 tablets once weekly        # Seronegative rheumatoid arthritis  - Hermelinda presents today for follow-up of rheumatoid arthritis, and has been well controlled on a regimen of subcutaneous Enbrel 50 mg weekly and oral [id number] 4-7 IOIBJAH weekly with folic acid 1 mg daily  She will continue with this regimen unchanged  She is due for high risk medication lab monitoring      # Lumbar DJD  - Continue follow up with Pain Management       # Osteopenia  - We reviewed her recently done DEXA scan in February 2020 which is consistent with osteopenia, but shows a slightly increased risk of major osteoporotic fractures based on the FRAX tool   I advised her that as her DEXA scan overall appears to be stable without a history of fragility fractures, we do have the option to continue monitoring for now with a repeat DEXA scan in the next 1-2 years (plan for 2/2022)   On the other hand I also discussed with her the rationale behind initiating medications such as antiresorptives (bisphosphonate therapy)   We briefly discussed alendronate as a first-line option and I reviewed the side effects with her including but not limited to, risk for arthralgias, GI upset, osteonecrosis of the jaw or atypical fractures  She would like to hold off on any specific osteoporosis medications for now and discuss further after the next DEXA scan is done  - I advised her in the interim to continue daily calcium and vitamin-D supplements        Plan:  Diagnoses and all orders for this visit:    Rheumatoid arthritis of multiple sites with negative rheumatoid factor (HCC)    Long-term use of immunosuppressant medication    Methotrexate, long term, current use  -     CBC and differential; Future  -     Comprehensive metabolic panel; Future    Osteoarthritis of lumbar spine, unspecified spinal osteoarthritis complication status    Osteopenia, unspecified location      Activities as tolerated  Exercise: try to maintain a low impact exercise regimen as much as possible  Walk for 30 minutes a day for at least 3 days a week  Continue other medications as prescribed by PCP and other specialists  RTC in 5 months          HPI    INITIAL VISIT NOTE:  Ms Venecia Martinez a 26-year-old female with history significant for rheumatoid arthritis, osteopenia and lumbar degenerative arthritis, who presents for further management of rheumatoid arthritis and establishing with [facility]  Pamela Bruno is currently on subcutaneous Enbrel 50 mg weekly and methotrexate 8 tablets weekly  Pamela Bruno is referred by Dr Andres Crespo     Patient reports she was diagnosed with rheumatoid arthritis at the age of 27 when she initially presented with left knee pain and swelling   She states since then it has gradually progressed to involve multiple joints   She has tried multiple medications including hydroxychloroquine which caused a skin rash, gold injections, sulfasalazine and possibly other injectables although she cannot recall the names  Pamela Bruno has been well maintained on a combination of subcutaneous Enbrel 50 mg weekly and methotrexate 8 tablets weekly with folic acid 1 mg daily for the past 18-20 years  Pamela Bruno has not required to change her medications   She states overall her joint pains have been well controlled although on occasion she will notice an achiness sensation involving her hands, wrists and elbows   Intermittently she will notice swelling affecting her hands and feet   She also experiences morning stiffness primarily involving her hands and feet, and states the feet stiffness resolves right away but the hand stiffness persists for less than 30 minutes   She is currently not taking any steroids or over-the-counter pain medications   She refrains from NSAID use due to a history of esophagitis   She mentions previously she had been on chronic courses of steroids but has not required this in the past few years   Within the last year she has been dealing with increasing pain affecting her lower back radiating into her bilateral legs associated with a sensation of weakness  Pamela Bruno was seen by Orthopedics in [place] and apparently had imaging done which showed osteoarthritic changes affecting her lumbar spine   She did complete a course of physical therapy prior to relocating to this area  Pamela Bruno is not interested in seeing the [facility] at this time  Pamela Bruno manages her pain with Tylenol as needed      Overall she has been tolerating the Enbrel and methotrexate well without any concerns for side effects   She denies fevers, chills, unintentional weight loss, mouth ulcers, skin rash or GI issues   No other complaints noted at this time         9/9/2019:  Patient presents for a follow-up today   She is currently on subcutaneous Enbrel 50 mg weekly and oral methotrexate 8 tablets weekly with folic acid 1 mg daily      We reviewed her labs done following the prior office visit which revealed an unremarkable CBC, CMP, ESR, CRP, chronic hepatitis panel, QuantiFERON TB gold, vitamin-D, rheumatoid factor and anti CCP antibody   X-rays of her bilateral hands and feet did not show any inflammatory changes, but showed very minimal degenerative changes      She reports overall being stable on her current regimen of the Enbrel and methotrexate   She has not had any flare-ups of joint pain or swelling   Only on occasion will she experience mild pain around her hips and knees   She does experience morning stiffness which affects her diffusely, but lasts only a few minutes   She has not had to take any over-the-counter pain medications for her joint pains   She has been tolerating both medications well without any concerns for side effects or recurrent infections   When she does experience an achy sensation in her bilateral legs she does take Tylenol if needed      She is unsure when her last bone density exam was, but thinks it may have been more than 2 years ago  Chris Estrella does take calcium and vitamin-D supplements daily for a history of osteopenia      She denies any other complaints at this time         1/10/2020:  Patient presents for follow-up of seronegative rheumatoid arthritis  Chris Estrella is currently on oral methotrexate at 7 tablets once weekly with folic acid 1 mg daily, and Enbrel 50 mg once weekly   We reviewed her labs done following the prior office visit which showed an unremarkable CBC and CMP      She reports overall with decreasing her methotrexate from 8-7 tablets weekly there was no flare-up noted in her joint pains   She is currently denying any joint pain or swelling   She experiences 1 minutes of morning stiffness affecting her hands and feet   She has not had to take any over-the-counter pain medications   She has overall been tolerating the methotrexate and Enbrel well without any concerns for side effects or infections      She is yet to schedule the DEXA scan   She does take calcium and vitamin-D supplements daily         5/18/2020:  Patient presents for a follow-up of seronegative rheumatoid arthritis  Chris Estrella is currently on oral methotrexate at 6 tablets once weekly with folic acid 1 mg daily, and Enbrel 50 mg once weekly  Chris Estrella is due to get her high risk medication lab monitoring done   We reviewed her DEXA scan done in February 2020 which showed osteopenia, but her 10 year risk of major osteoporotic fracture as calculated by the FRAX score was slightly elevated at 23 8%      In terms of the rheumatoid arthritis, she reports that she is overall doing well at this time and denies any significant concerns for joint flare-ups including pain, swelling or stiffness   She is tolerating her regimen well without any concerns for side effects or infections   Over the past 2 months she has noticed left elbow pain which was initially sharp, but now she describes it as a dull constant pain   She states that activities such as lifting heavy items will worsen the pain and she feels slightly decreased strength in that arm   She does take Tylenol as needed which helps      For the osteopenia history, she does take daily calcium supplements but not vitamin-D   She denies a history of fragility fractures         9/21/2020:  Patient presents for a follow-up of seronegative rheumatoid arthritis  Jeremy Bryan is currently on oral methotrexate at 6 tablets once weekly with folic acid 1 mg daily, and Enbrel 50 mg once weekly  I reviewed her labs from July which were normal      She reports in terms of the rheumatoid arthritis she has been doing very well and denies any joint pains or swelling  She does experience morning stiffness of her hands which takes a few minutes to improve  She has been tolerating the methotrexate and Enbrel well without any concerns for side effects or infections      Over the past few months she has been dealing with low back pain with radiation down her right leg for which she contacted me  She was referred to Orthopedics and had an MRI of her lumbar spine done which showed multilevel disc herniations and bulges  She is going to be seeing pain management on Friday to discuss interventional procedures      For the osteopenia history she is on daily calcium and vitamin-D supplements  She denies a history of fragility fractures  3/22/2021:  Patient presents for a follow-up of seronegative rheumatoid arthritis  Jeremy Bryan is currently on oral methotrexate at 5-6 tablets once weekly with folic acid 1 mg daily, and Enbrel 50 mg once weekly  I reviewed her labs from December which were normal     She reports overall from a rheumatoid arthritis perspective she has been doing well    She did have an episode of pain affecting her left elbow which resolved, but it may still be sensitive to light touch  No other flare-ups of joint pains or swelling  She does experience morning stiffness which mostly affects her hands and resolves within a few minutes  She has not been taking any over-the-counter NSAIDs  She has tolerated the methotrexate and Enbrel well without any concerns for side effects or infections  She did follow with Orthopedics for the low back pain with right-sided radicular symptoms for which she was seen by pain management and had a steroid injection done which provided her with good relief  She reports similar symptoms now occurring down her left leg  For the osteopenia history she is on daily calcium and vitamin-D supplements  She denies a history of fragility fractures  The following portions of the patient's history were reviewed and updated as appropriate: allergies, current medications, past family history, past medical history, past social history, past surgical history and problem list       Review of Systems  Constitutional: Negative for weight change, fevers, chills, night sweats, fatigue  ENT/Mouth: Negative for hearing changes, ear pain, nasal congestion, sinus pain, hoarseness, sore throat, rhinorrhea, swallowing difficulty  Eyes: Negative for pain, redness, discharge, vision changes  Cardiovascular: Negative for chest pain, SOB, palpitations  Respiratory: Negative for cough, sputum, wheezing, dyspnea  Gastrointestinal: Negative for nausea, vomiting, diarrhea, constipation, pain, heartburn  Genitourinary: Negative for dysuria, urinary frequency, hematuria  Musculoskeletal: As per HPI  Skin: Negative for skin rash, color changes  Neuro: Negative for weakness, numbness, tingling, loss of consciousness  Psych: Negative for anxiety, depression  Heme/Lymph: Negative for easy bruising, bleeding, lymphadenopathy          Past Medical History:   Diagnosis Date    Allergic rhinitis     Anxiety     Depression     Lumbar spondylosis 9/25/2020    Osteoporosis     Rheumatoid arthritis ([address] )        Past Surgical History:   Procedure Laterality Date    AUGMENTATION MAMMAPLASTY Bilateral 2010    BREAST BIOPSY Right 2008    benign    KNEE SURGERY      SINUS SURGERY         Social History     Socioeconomic History    Marital status: /Civil Union     Spouse name: Not on file    Number of children: Not on file    Years of education: Not on file    Highest education level: Not on file   Occupational History    Not on file   Social Needs    Financial resource strain: Not on file    Food insecurity     Worry: Not on file     Inability: Not on file   [employer] needs     Medical: Not on file     Non-medical: Not on file   Tobacco Use    Smoking status: Never Smoker    Smokeless tobacco: Never Used   Substance and Sexual Activity    Alcohol use:  Yes     Alcohol/week: 2 0 standard drinks     Types: 2 Glasses of wine per week     Frequency: Monthly or less     Drinks per session: 1 or 2     Binge frequency: Never    Drug use: Not Currently    Sexual activity: Not on file   Lifestyle    Physical activity     Days per week: Not on file     Minutes per session: Not on file    Stress: Not on file   Relationships    Social connections     Talks on phone: Not on file     Gets together: Not on file     Attends [employer] service: Not on file     Active member of club or organization: Not on file     Attends meetings of clubs or organizations: Not on file     Relationship status: Not on file    Intimate partner violence     Fear of current or ex partner: Not on file     Emotionally abused: Not on file     Physically abused: Not on file     Forced sexual activity: Not on file   Other Topics Concern    Not on file   Social History Narrative    Not on file       Family History   Problem Relation Age of Onset    Hypertension Mother     Cancer Mother     Stroke Mother    [facility] Crohn's disease Mother     Hyperlipidemia Mother     Heart attack Mother    Andre Chatterjee Breast cancer Mother 79    Hypertension Father     Cancer Father     Stroke Father     Hyperlipidemia Father     Heart attack Father     Hyperlipidemia Sister     Hyperlipidemia Brother        Allergies   Allergen Reactions    Plaquenil [Hydroxychloroquine] Rash       Current Outpatient Medications:     buPROPion (WELLBUTRIN XL) 300 mg 24 hr tablet, TAKE 1 TABLET EVERY MORNING, Disp: 90 tablet, Rfl: 3    Enbrel SureClick 50 MG/ML injection, INJECT 1 PEN UNDER THE SKIN EVERY 7 DAYS , Disp: 4 Syringe, Rfl: 5    EPINEPHrine (ADRENALIN) 0 1 % nasal solution, 0 5 mL into each nostril as needed, Disp: , Rfl:     folic acid (FOLVITE) 1 mg tablet, Take by mouth daily, Disp: , Rfl:     levocetirizine (XYZAL) 5 MG tablet, Take 5 mg by mouth daily as needed for allergies, Disp: , Rfl:     methotrexate 2 5 mg tablet, Take 6 tablets (15 mg total) by mouth once a week, Disp: 72 tablet, Rfl: 1    Na Sulfate-K Sulfate-Mg Sulf (Suprep Bowel Prep Kit) 17 5-3 13-1 6 GM/177ML SOLN, Take 1 Bottle by mouth once for 1 dose, Disp: 1 Bottle, Rfl: 0    omeprazole (PriLOSEC) 40 MG capsule, Take 1 capsule (40 mg total) by mouth daily, Disp: 30 capsule, Rfl: 3    sertraline (ZOLOFT) 100 mg tablet, TAKE 1 AND 1/2 TABLETS     (150MG TOTAL) DAILY, Disp: 135 tablet, Rfl: 3    Triamcinolone Acetonide (NASACORT ALLERGY 24HR) 55 MCG/ACT AERO, 2 sprays each nostril daily, Disp: 16 5 g, Rfl: 3    zolpidem (AMBIEN) 10 mg tablet, TAKE 1 TABLET BY MOUTH DAILY AT BEDTIME AS NEEDED FOR SLEEP, Disp: 30 tablet, Rfl: 0      Objective:    Vitals:    03/22/21 0951   BP: 111/77   BP Location: Left arm   Patient Position: Sitting   Cuff Size: Adult   Pulse: 76   Temp: 98 4 °F (36 9 °C)   Weight: 68 9 kg (152 lb)       Physical Exam  General: Well appearing, well nourished, in no distress  Oriented x 3, normal mood and affect  Ambulating without difficulty    Skin: Good turgor, no rash, unusual bruising or prominent lesions  Hair: Normal texture and distribution  Nails: Normal color, no deformities  HEENT:  Head: Normocephalic, atraumatic  Eyes: Conjunctiva clear, sclera non-icteric, EOM intact  Extremities: No amputations or deformities, cyanosis, edema  Musculoskeletal:   No peripheral joint soft tissue swelling or tenderness noted  Neurologic: Alert and oriented  No focal neurological deficits appreciated  Psychiatric: Normal mood and affect  FELIPE Ji    Rheumatology

## 2021-03-22 ENCOUNTER — OFFICE VISIT (OUTPATIENT)
Dept: RHEUMATOLOGY | Facility: CLINIC | Age: 61
End: 2021-03-22
Payer: COMMERCIAL

## 2021-03-22 VITALS
HEART RATE: 76 BPM | WEIGHT: 152 LBS | DIASTOLIC BLOOD PRESSURE: 77 MMHG | SYSTOLIC BLOOD PRESSURE: 111 MMHG | TEMPERATURE: 98.4 F | BODY MASS INDEX: 25.39 KG/M2

## 2021-03-22 DIAGNOSIS — Z79.899 METHOTREXATE, LONG TERM, CURRENT USE: ICD-10-CM

## 2021-03-22 DIAGNOSIS — Z79.899 LONG-TERM USE OF IMMUNOSUPPRESSANT MEDICATION: ICD-10-CM

## 2021-03-22 DIAGNOSIS — M85.80 OSTEOPENIA, UNSPECIFIED LOCATION: ICD-10-CM

## 2021-03-22 DIAGNOSIS — M47.816 OSTEOARTHRITIS OF LUMBAR SPINE, UNSPECIFIED SPINAL OSTEOARTHRITIS COMPLICATION STATUS: ICD-10-CM

## 2021-03-22 DIAGNOSIS — M06.09 RHEUMATOID ARTHRITIS OF MULTIPLE SITES WITH NEGATIVE RHEUMATOID FACTOR (HCC): Primary | ICD-10-CM

## 2021-03-22 PROCEDURE — 99214 OFFICE O/P EST MOD 30 MIN: CPT | Performed by: INTERNAL MEDICINE

## 2021-03-22 PROCEDURE — 1036F TOBACCO NON-USER: CPT | Performed by: INTERNAL MEDICINE

## 2021-03-25 ENCOUNTER — TELEPHONE (OUTPATIENT)
Dept: GASTROENTEROLOGY | Facility: CLINIC | Age: 61
End: 2021-03-25

## 2021-03-25 RX ORDER — SODIUM, POTASSIUM,MAG SULFATES 17.5-3.13G
1 SOLUTION, RECONSTITUTED, ORAL ORAL ONCE
Qty: 354 ML | Refills: 0 | Status: SHIPPED | OUTPATIENT
Start: 2021-03-25 | End: 2021-04-02

## 2021-03-25 NOTE — TELEPHONE ENCOUNTER
Patients GI provider:  Dr Shadia Horn    Number to return call: N/A    Reason for call: Pharmacy calling stating Suprep Bowel Prep Kit is not covered by pt's insurance, but Golytely is      Scheduled procedure/appointment date if applicable: Procedure 4/2/53

## 2021-03-26 ENCOUNTER — IMMUNIZATIONS (OUTPATIENT)
Dept: FAMILY MEDICINE CLINIC | Facility: HOSPITAL | Age: 61
End: 2021-03-26

## 2021-03-26 DIAGNOSIS — Z23 ENCOUNTER FOR IMMUNIZATION: Primary | ICD-10-CM

## 2021-03-26 PROCEDURE — 0001A SARS-COV-2 / COVID-19 MRNA VACCINE (PFIZER-BIONTECH) 30 MCG: CPT

## 2021-03-26 PROCEDURE — 91300 SARS-COV-2 / COVID-19 MRNA VACCINE (PFIZER-BIONTECH) 30 MCG: CPT

## 2021-03-29 NOTE — TELEPHONE ENCOUNTER
LVM that suprep is not covered and will be emailing Dulcolax/Miralax prep instructions to pt email on file  Gave direct line if has any questions

## 2021-03-31 DIAGNOSIS — Z20.822 ENCOUNTER FOR LABORATORY TESTING FOR COVID-19 VIRUS: ICD-10-CM

## 2021-03-31 PROCEDURE — U0003 INFECTIOUS AGENT DETECTION BY NUCLEIC ACID (DNA OR RNA); SEVERE ACUTE RESPIRATORY SYNDROME CORONAVIRUS 2 (SARS-COV-2) (CORONAVIRUS DISEASE [COVID-19]), AMPLIFIED PROBE TECHNIQUE, MAKING USE OF HIGH THROUGHPUT TECHNOLOGIES AS DESCRIBED BY CMS-2020-01-R: HCPCS | Performed by: INTERNAL MEDICINE

## 2021-03-31 PROCEDURE — U0005 INFEC AGEN DETEC AMPLI PROBE: HCPCS | Performed by: INTERNAL MEDICINE

## 2021-04-01 LAB — SARS-COV-2 RNA RESP QL NAA+PROBE: NEGATIVE

## 2021-04-02 RX ORDER — DIPHENOXYLATE HYDROCHLORIDE AND ATROPINE SULFATE 2.5; .025 MG/1; MG/1
1 TABLET ORAL DAILY
COMMUNITY

## 2021-04-02 NOTE — PRE-PROCEDURE INSTRUCTIONS
Pre-Surgery Instructions:   Medication Instructions    bisacodyl (DULCOLAX) 5 mg EC tablet Patient was instructed by Physician and understands   buPROPion (WELLBUTRIN XL) 300 mg 24 hr tablet Instructed patient per Anesthesia Guidelines   Calcium Carbonate-Vit D-Min (CALCIUM 1200 PO) Instructed patient per Anesthesia Guidelines   Enbrel SureClick 50 MG/ML injection Instructed patient per Anesthesia Guidelines   Ferrous Sulfate (IRON PO) Last dose 5/93/64    folic acid (FOLVITE) 1 mg tablet Instructed patient per Anesthesia Guidelines   levocetirizine (XYZAL) 5 MG tablet Instructed patient per Anesthesia Guidelines   methotrexate 2 5 mg tablet Instructed patient per Anesthesia Guidelines   multivitamin (THERAGRAN) TABS Last dose 3/29/21    omeprazole (PriLOSEC) 40 MG capsule Instructed patient per Anesthesia Guidelines   Polyethylene Glycol 3350 (MIRALAX PO) Patient was instructed by Physician and understands   sertraline (ZOLOFT) 100 mg tablet Instructed patient per Anesthesia Guidelines   Triamcinolone Acetonide (NASACORT ALLERGY 24HR) 55 MCG/ACT AERO Instructed patient per Anesthesia Guidelines   VITAMIN D PO Instructed patient per Anesthesia Guidelines   zolpidem (AMBIEN) 10 mg tablet Instructed patient per Anesthesia Guidelines  Pt to follow Dr Trinh Easley instructions    bowen Menjivar [phone]

## 2021-04-05 ENCOUNTER — ANESTHESIA EVENT (OUTPATIENT)
Dept: GASTROENTEROLOGY | Facility: AMBULARY SURGERY CENTER | Age: 61
End: 2021-04-05

## 2021-04-05 RX ORDER — SODIUM CHLORIDE, SODIUM LACTATE, POTASSIUM CHLORIDE, CALCIUM CHLORIDE 600; 310; 30; 20 MG/100ML; MG/100ML; MG/100ML; MG/100ML
125 INJECTION, SOLUTION INTRAVENOUS CONTINUOUS
Status: CANCELLED | OUTPATIENT
Start: 2021-04-05

## 2021-04-06 ENCOUNTER — HOSPITAL ENCOUNTER (OUTPATIENT)
Dept: GASTROENTEROLOGY | Facility: AMBULARY SURGERY CENTER | Age: 61
Setting detail: OUTPATIENT SURGERY
Discharge: HOME | End: 2021-04-06
Attending: INTERNAL MEDICINE | Admitting: INTERNAL MEDICINE
Payer: COMMERCIAL

## 2021-04-06 ENCOUNTER — ANESTHESIA (OUTPATIENT)
Dept: GASTROENTEROLOGY | Facility: AMBULARY SURGERY CENTER | Age: 61
End: 2021-04-06

## 2021-04-06 VITALS
WEIGHT: 152 LBS | SYSTOLIC BLOOD PRESSURE: 132 MMHG | OXYGEN SATURATION: 100 % | BODY MASS INDEX: 24.43 KG/M2 | RESPIRATION RATE: 18 BRPM | DIASTOLIC BLOOD PRESSURE: 60 MMHG | HEIGHT: 66 IN | HEART RATE: 61 BPM | TEMPERATURE: 96.5 F

## 2021-04-06 DIAGNOSIS — K44.9 HIATAL HERNIA: ICD-10-CM

## 2021-04-06 DIAGNOSIS — Z12.11 COLON CANCER SCREENING: ICD-10-CM

## 2021-04-06 DIAGNOSIS — R10.13 EPIGASTRIC PAIN: ICD-10-CM

## 2021-04-06 PROCEDURE — 45380 COLONOSCOPY AND BIOPSY: CPT | Performed by: INTERNAL MEDICINE

## 2021-04-06 PROCEDURE — 88305 TISSUE EXAM BY PATHOLOGIST: CPT | Performed by: PATHOLOGY

## 2021-04-06 PROCEDURE — 43239 EGD BIOPSY SINGLE/MULTIPLE: CPT | Performed by: INTERNAL MEDICINE

## 2021-04-06 RX ORDER — PROPOFOL 10 MG/ML
INJECTION, EMULSION INTRAVENOUS AS NEEDED
Status: DISCONTINUED | OUTPATIENT
Start: 2021-04-06 | End: 2021-04-06

## 2021-04-06 RX ORDER — SODIUM CHLORIDE, SODIUM LACTATE, POTASSIUM CHLORIDE, CALCIUM CHLORIDE 600; 310; 30; 20 MG/100ML; MG/100ML; MG/100ML; MG/100ML
INJECTION, SOLUTION INTRAVENOUS CONTINUOUS PRN
Status: DISCONTINUED | OUTPATIENT
Start: 2021-04-06 | End: 2021-04-06

## 2021-04-06 RX ORDER — LIDOCAINE HYDROCHLORIDE 10 MG/ML
INJECTION, SOLUTION EPIDURAL; INFILTRATION; INTRACAUDAL; PERINEURAL AS NEEDED
Status: DISCONTINUED | OUTPATIENT
Start: 2021-04-06 | End: 2021-04-06

## 2021-04-06 RX ORDER — ONDANSETRON 2 MG/ML
4 INJECTION INTRAMUSCULAR; INTRAVENOUS ONCE AS NEEDED
Status: CANCELLED | OUTPATIENT
Start: 2021-04-06

## 2021-04-06 RX ORDER — PROPOFOL 10 MG/ML
INJECTION, EMULSION INTRAVENOUS CONTINUOUS PRN
Status: DISCONTINUED | OUTPATIENT
Start: 2021-04-06 | End: 2021-04-06

## 2021-04-06 RX ADMIN — SODIUM CHLORIDE, SODIUM LACTATE, POTASSIUM CHLORIDE, AND CALCIUM CHLORIDE: .6; .31; .03; .02 INJECTION, SOLUTION INTRAVENOUS at 09:13

## 2021-04-06 RX ADMIN — PROPOFOL 100 MG: 10 INJECTION, EMULSION INTRAVENOUS at 09:19

## 2021-04-06 RX ADMIN — PROPOFOL 20 MG: 10 INJECTION, EMULSION INTRAVENOUS at 09:20

## 2021-04-06 RX ADMIN — LIDOCAINE HYDROCHLORIDE 50 MG: 10 INJECTION, SOLUTION EPIDURAL; INFILTRATION; INTRACAUDAL; PERINEURAL at 09:19

## 2021-04-06 RX ADMIN — PROPOFOL 130 MCG/KG/MIN: 10 INJECTION, EMULSION INTRAVENOUS at 09:19

## 2021-04-06 NOTE — ANESTHESIA PREPROCEDURE EVALUATION
Procedure:  COLONOSCOPY  EGD    Relevant Problems   ANESTHESIA (within normal limits)      CARDIO (within normal limits)      ENDO (within normal limits)      GI/HEPATIC   (+) Hiatal hernia      MUSCULOSKELETAL   (+) Lumbar spondylosis   (+) Rheumatoid arthritis of multiple sites with negative rheumatoid factor (HCC)      NEURO/PSYCH   (+) Recurrent major depressive disorder, in full remission ([facility] [address] )      PULMONARY (within normal limits)        Physical Exam    Airway    Mallampati score: I  TM Distance: >3 FB  Neck ROM: full     Dental   No notable dental hx     Cardiovascular  Rhythm: regular, Rate: normal,     Pulmonary  Breath sounds clear to auscultation,     Other Findings        Anesthesia Plan  ASA Score- 2     Anesthesia Type- IV sedation with anesthesia with ASA Monitors  Additional Monitors:   Airway Plan:           Plan Factors-Exercise tolerance (METS): >4 METS  Chart reviewed  EKG reviewed  Existing labs reviewed  Patient summary reviewed  Patient is not a current smoker  Induction- intravenous  Postoperative Plan-     Informed Consent- Anesthetic plan and risks discussed with patient  I personally reviewed this patient with the CRNA  Discussed and agreed on the Anesthesia Plan with the CRNA  Eron Sánchez

## 2021-04-06 NOTE — H&P
History and Physical - [facility]  Hermelinda Song 61 y o  female MRN: [id number]    HPI: Hira Mack is a 61y o  year old female who presents with epigastric pain and colon cancer screening  Review of Systems    Historical Information   Past Medical History:   Diagnosis Date    Allergic rhinitis     Anxiety     Colon polyp     Depression     Epigastric pain     from hiatal hernia    Hiatal hernia     Insomnia     Iron deficiency anemia     Lumbar spondylosis 9/25/2020    Osteoporosis     RA [facility]rheumatoid arthritis) ([facility] )     Rheumatoid arthritis ([facility] )      Past Surgical History:   Procedure Laterality Date    ABDOMINAL SURGERY      "tummy tuck"    AUGMENTATION MAMMAPLASTY Bilateral 2010    BREAST BIOPSY Right 2008    benign    COLONOSCOPY      KNEE ARTHROSCOPY Bilateral     LASIK      SINUS SURGERY       Social History   Social History     Substance and Sexual Activity   Alcohol Use Yes    Alcohol/week: 2 0 standard drinks    Types: 2 Glasses of wine per week    Frequency: Monthly or less    Drinks per session: 1 or 2    Binge frequency: Never     Social History     Substance and Sexual Activity   Drug Use Not Currently     Social History     Tobacco Use   Smoking Status Never Smoker   Smokeless Tobacco Never Used     Family History   Problem Relation Age of Onset    Hypertension Mother     Cancer Mother         breast    Stroke Mother     Crohn's disease Mother     Hyperlipidemia Mother     Heart attack Mother     Breast cancer Mother 79    Heart disease Mother         MI    Hypertension Father     Stroke Father     Hyperlipidemia Father     Heart attack Father         ?     Other Sister         breast watch    Hyperlipidemia Brother        Meds/Allergies     (Not in a hospital admission)      Allergies   Allergen Reactions    Plaquenil [Hydroxychloroquine] Rash       Objective     /68   Pulse 73   Temp (!) 96 5 °F (35 8 °C) (Temporal)   Resp 16   Ht 5' 6" (1 676 m)   Wt 68 9 kg (152 lb)   SpO2 98%   BMI 24 53 kg/m²       PHYSICAL EXAM    Gen: NAD  CV: RRR  CHEST: Clear  ABD: soft, NT/ND  EXT: no edema  Neuro: AAO      ASSESSMENT/PLAN:  This is a 61y o  year old female here for epigastric pain and colon cancer screening       PLAN:   Procedure: egd/colonoscopy

## 2021-04-13 DIAGNOSIS — F51.01 PRIMARY INSOMNIA: ICD-10-CM

## 2021-04-13 RX ORDER — ZOLPIDEM TARTRATE 10 MG/1
10 TABLET ORAL
Qty: 30 TABLET | Refills: 0 | Status: SHIPPED | OUTPATIENT
Start: 2021-04-13 | End: 2021-05-19

## 2021-04-14 ENCOUNTER — TELEPHONE (OUTPATIENT)
Dept: GASTROENTEROLOGY | Facility: AMBULARY SURGERY CENTER | Age: 61
End: 2021-04-14

## 2021-04-14 NOTE — TELEPHONE ENCOUNTER
----- Message from Naresh De La Cruz MD sent at 4/12/2021  9:21 AM EDT -----  Please inform patient biopsies from stomach were negative for H pylori, biopsies from esophagus were negative for  Roa's esophagus which is great news  Continue pantoprazole  Small polyp removed was a benign hyperplastic polyp recommend repeat colonoscopy in 10 years  Please have the patient follow up in 1 year's time, sooner if needed, call with any questions or concerns  Continue with current medications, call us for refills if needed

## 2021-04-16 ENCOUNTER — IMMUNIZATIONS (OUTPATIENT)
Dept: FAMILY MEDICINE CLINIC | Facility: HOSPITAL | Age: 61
End: 2021-04-16

## 2021-04-16 DIAGNOSIS — Z23 ENCOUNTER FOR IMMUNIZATION: Primary | ICD-10-CM

## 2021-04-16 PROCEDURE — 91300 SARS-COV-2 / COVID-19 MRNA VACCINE (PFIZER-BIONTECH) 30 MCG: CPT

## 2021-04-16 PROCEDURE — 0002A SARS-COV-2 / COVID-19 MRNA VACCINE (PFIZER-BIONTECH) 30 MCG: CPT

## 2021-05-19 DIAGNOSIS — F51.01 PRIMARY INSOMNIA: ICD-10-CM

## 2021-05-19 RX ORDER — ZOLPIDEM TARTRATE 10 MG/1
TABLET ORAL
Qty: 30 TABLET | Refills: 0 | Status: SHIPPED | OUTPATIENT
Start: 2021-05-19 | End: 2021-06-23

## 2021-06-10 DIAGNOSIS — M06.9 RHEUMATOID ARTHRITIS INVOLVING MULTIPLE SITES (HCC): ICD-10-CM

## 2021-06-10 RX ORDER — ETANERCEPT 50 MG/ML
SOLUTION SUBCUTANEOUS
Qty: 4 ML | Refills: 5 | Status: SHIPPED | OUTPATIENT
Start: 2021-06-10 | End: 2021-12-21

## 2021-06-17 ENCOUNTER — PATIENT MESSAGE (OUTPATIENT)
Dept: PAIN MEDICINE | Facility: CLINIC | Age: 61
End: 2021-06-17

## 2021-06-24 ENCOUNTER — TELEPHONE (OUTPATIENT)
Dept: PAIN MEDICINE | Facility: CLINIC | Age: 61
End: 2021-06-24

## 2021-06-24 NOTE — TELEPHONE ENCOUNTER
From: Hermelinda Song  To: Palmira Wing,   Sent: 6/17/2021 3:47 PM EDT  Subject: Non-Urgent Medical Question    I think I need to see Dr Lida Prasad in order to get shots for my left side  I am having a lot of pain on my left side  I was there in October and had shots for my right side  I don't have the intense pain on the right side, but my left side hurts and is impacting my ability to work, and walk  I would like to do something before I have to stop working like I had to last year for a while  Can we just set up an appointment for the steroid shots or do you need me to come in? I am having pain down my left side, muscle spasms in my left leg and tingling down through to my foot    Hermelinda Celaya

## 2021-06-24 NOTE — TELEPHONE ENCOUNTER
Regarding: FW: Non-Urgent Medical Question  Contact: [phone]  Per other task:    Cyndi Gosselin, DO     JW    12:32 PM  Note        Please schedule office visit for re-evaluation since the patient's pain is changing      ----- Message -----  From: Cyndi Gosselin, DO  Sent: 6/24/2021  12:32 PM EDT  To: Dylan Rose, Vita  Subject: Non-Urgent Medical Question                      ----- Message from Cyndi Gosselin, DO sent at 6/24/2021 12:32 PM EDT -----       ----- Message from Hermelinda Song to Cyndi Gosselin, DO sent at 6/17/2021  3:47 PM -----   I think I need to see Dr Enzo Valentino in order to get shots for my left side  I am having a lot of pain on my left side  I was there in October and had shots for my right side  I don't have the intense pain on the right side, but my left side hurts and is impacting my ability to work, and walk  I would like to do something before I have to stop working like I had to last year for a while  Can we just set up an appointment for the steroid shots or do you need me to come in? I am having pain down my left side, muscle spasms in my left leg and tingling down through to my foot    Hermelinda Celaya

## 2021-06-29 ENCOUNTER — OFFICE VISIT (OUTPATIENT)
Dept: PAIN MEDICINE | Facility: CLINIC | Age: 61
End: 2021-06-29
Payer: COMMERCIAL

## 2021-06-29 VITALS
HEIGHT: 66 IN | WEIGHT: 152 LBS | HEART RATE: 72 BPM | SYSTOLIC BLOOD PRESSURE: 143 MMHG | BODY MASS INDEX: 24.43 KG/M2 | DIASTOLIC BLOOD PRESSURE: 84 MMHG

## 2021-06-29 DIAGNOSIS — M54.16 LUMBAR RADICULOPATHY: Primary | ICD-10-CM

## 2021-06-29 DIAGNOSIS — M51.26 LUMBAR DISC HERNIATION: ICD-10-CM

## 2021-06-29 DIAGNOSIS — M54.16 RADICULOPATHY, LUMBAR REGION: ICD-10-CM

## 2021-06-29 DIAGNOSIS — M47.816 LUMBAR SPONDYLOSIS: ICD-10-CM

## 2021-06-29 PROCEDURE — 3008F BODY MASS INDEX DOCD: CPT | Performed by: NURSE PRACTITIONER

## 2021-06-29 PROCEDURE — 1036F TOBACCO NON-USER: CPT | Performed by: NURSE PRACTITIONER

## 2021-06-29 PROCEDURE — 99214 OFFICE O/P EST MOD 30 MIN: CPT | Performed by: NURSE PRACTITIONER

## 2021-06-29 RX ORDER — GABAPENTIN 300 MG/1
CAPSULE ORAL
Qty: 90 CAPSULE | Refills: 1 | Status: SHIPPED | OUTPATIENT
Start: 2021-06-29 | End: 2021-08-17 | Stop reason: SDUPTHER

## 2021-06-29 NOTE — PROGRESS NOTES
Assessment:  1  Lumbar radiculopathy    2  Radiculopathy, lumbar region    3  Lumbar spondylosis    4  Lumbar disc herniation        Plan:  1  I will schedule the patient for a left L4 and L5 TFESI to address the inflammatory component the patient's pain  Complete risks and benefits including bleeding, infection, tissue reaction, nerve injury and allergic reaction were discussed  The patient was agreeable and verbalized an understanding  2  I will restart the patient on gabapentin 300 mg q h s  and titrate up to 300 mg t i d  for neuropathic complaints  Patient was educated on medications most common side effects which include dizziness, drowsiness, and swelling of the hands and feet  Patient was instructed to call the office with any adverse medication side effects  The patient is also aware that if they would like to come off of the medication, they need to let us know as suddenly stopping the medication puts them at risk to have a seizure  3  The patient will continue with her home exercise program as taught to her by physical therapy  4  I will avoid NSAIDs secondary to GI upset  5  The patient may continue Tylenol p r n  should not exceed more than 3000 mg in 24 hours   6  I will follow up with the patient after her procedure or sooner if needed     [employer] software was used to dictate this note  It may contain errors with dictating incorrect words or incorrect spelling  Please contact the provider directly with any questions  History of Present Illness: The patient is a 61 y o  female  With a history of rheumatoid arthritis last seen on 5/4/21 who presents for a follow up office visit in regards to chronic lumbosacral back pain that radiates down the anterolateral aspect of the left lower extremity to the foot secondary to  Lumbar degenerative disc disease, lumbar spondylosis, lumbar stenosis, lumbar radiculopathy and chronic pain syndrome    The patient denies bowel or bladder incontinence or saddle anesthesia  She will intermittently have right lower extremity symptoms which should not too bothersome at this time  She has had a right L5 and S1 TFESI in the past with significant relief  She was prescribed gabapentin which she is no longer taking  She did find moderate relief with this medication at 300 mg t i d  She is unable to tolerate NSAIDs secondary to GI upset finds Tylenol minimally effective  She has participated in physical therapy in the past with some relief and continues to do these exercises on a daily basis at home  MRI of the lumbar spine reveals disc herniations at L2-3 and L4-5 eccentric to the left and disc herniation L5-S1 eccentric to the right  Patient also noted to have facet arthropathy  The patient rates her pain 7/10 on the numeric pain rating scale  She states her pain is intermittent in nature and bothersome the morning  She characterizes the pain as sharp and pins and needles    I have personally reviewed and/or updated the patient's past medical history, past surgical history, family history, social history, current medications, allergies, and vital signs today  Review of Systems:    Review of Systems   Respiratory: Negative for shortness of breath  Cardiovascular: Negative for chest pain  Gastrointestinal: Negative for constipation, diarrhea, nausea and vomiting  Musculoskeletal: Positive for gait problem  Negative for arthralgias, joint swelling and myalgias  Skin: Negative for rash  Neurological: Negative for dizziness, seizures and weakness  All other systems reviewed and are negative          Past Medical History:   Diagnosis Date    Allergic rhinitis     Anxiety     Colon polyp     Depression     Epigastric pain     from hiatal hernia    Hiatal hernia     Insomnia     Iron deficiency anemia     Lumbar spondylosis 9/25/2020    Osteoporosis     RA (rheumatoid arthritis) (HCC)     Rheumatoid arthritis (HCC)        Past Surgical History:   Procedure Laterality Date    ABDOMINAL SURGERY      "tummy tuck"    AUGMENTATION MAMMAPLASTY Bilateral 2010    BREAST BIOPSY Right 2008    benign    COLONOSCOPY      KNEE ARTHROSCOPY Bilateral     LASIK      SINUS SURGERY         Family History   Problem Relation Age of Onset    Hypertension Mother     Cancer Mother         breast    Stroke Mother     Crohn's disease Mother     Hyperlipidemia Mother     Heart attack Mother     Breast cancer Mother 79    Heart disease Mother         MI    Hypertension Father     Stroke Father     Hyperlipidemia Father     Heart attack Father         ?  Other Sister         breast watch    Hyperlipidemia Brother        Social History     Occupational History    Not on file   Tobacco Use    Smoking status: Never Smoker    Smokeless tobacco: Never Used   Vaping Use    Vaping Use: Never used   Substance and Sexual Activity    Alcohol use:  Yes     Alcohol/week: 2 0 standard drinks     Types: 2 Glasses of wine per week    Drug use: Not Currently    Sexual activity: Not on file         Current Outpatient Medications:     bisacodyl (DULCOLAX) 5 mg EC tablet, Take 10 mg by mouth once, Disp: , Rfl:     buPROPion (WELLBUTRIN XL) 300 mg 24 hr tablet, TAKE 1 TABLET EVERY MORNING (Patient taking differently: 300 mg every other day ), Disp: 90 tablet, Rfl: 3    Calcium Carbonate-Vit D-Min (CALCIUM 1200 PO), Take by mouth every morning, Disp: , Rfl:     Enbrel SureClick 50 MG/ML injection, INJECT 1 PEN UNDER THE SKIN EVERY 7 DAYS , Disp: 4 mL, Rfl: 5    Ferrous Sulfate (IRON PO), Take by mouth every morning Last dose 3/29/21, Disp: , Rfl:     folic acid (FOLVITE) 1 mg tablet, Take 1 mg by mouth daily , Disp: , Rfl:     gabapentin (NEURONTIN) 300 mg capsule, Take 1 PO HS x 5 days, then 1 PO BID x 5 days, then 1 PO TID, Disp: 90 capsule, Rfl: 1    levocetirizine (XYZAL) 5 MG tablet, Take 5 mg by mouth daily as needed for allergies, Disp: , Rfl:   methotrexate 2 5 mg tablet, Take 6 tablets (15 mg total) by mouth once a week (Patient taking differently: Take 12 5 mg by mouth once a week On Thursday), Disp: 72 tablet, Rfl: 1    multivitamin (THERAGRAN) TABS, Take 1 tablet by mouth daily Last dose 3/29/21, Disp: , Rfl:     omeprazole (PriLOSEC) 40 MG capsule, Take 1 capsule (40 mg total) by mouth daily (Patient taking differently: Take 40 mg by mouth every morning ), Disp: 30 capsule, Rfl: 3    Polyethylene Glycol 3350 (MIRALAX PO), Take by mouth once, Disp: , Rfl:     sertraline (ZOLOFT) 100 mg tablet, TAKE 1 AND 1/2 TABLETS     (150MG TOTAL) DAILY (Patient taking differently: 150 mg every morning ), Disp: 135 tablet, Rfl: 3    Triamcinolone Acetonide (NASACORT ALLERGY 24HR) 55 MCG/ACT AERO, 2 sprays each nostril daily (Patient taking differently: daily at bedtime 2 sprays each nostril daily), Disp: 16 5 g, Rfl: 3    VITAMIN D PO, Take 10,000 Units by mouth every morning, Disp: , Rfl:     zolpidem (AMBIEN) 10 mg tablet, TAKE 1 TABLET BY MOUTH DAILY AT BEDTIME AS NEEDED FOR SLEEP, Disp: 90 tablet, Rfl: 0    Allergies   Allergen Reactions    Plaquenil [Hydroxychloroquine] Rash       Physical Exam:    /84   Pulse 72   Ht 5' 6" (1 676 m)   Wt 68 9 kg (152 lb)   BMI 24 53 kg/m²     Constitutional:normal, well developed, well nourished, alert, in no distress and non-toxic and no overt pain behavior  Eyes:anicteric  HEENT:grossly intact  Neck:supple, symmetric, trachea midline and no masses   Pulmonary:even and unlabored  Cardiovascular:No edema or pitting edema present  Skin:Normal without rashes or lesions and well hydrated  Psychiatric:Mood and affect appropriate  Neurologic:Cranial Nerves II-XII grossly intact  Musculoskeletal:normal gait  Left SI joint tender to palpation  Bilateral lower extremity strength 5/5 in all muscle groups  Positive Wilber's test on the left and negative on the right    Positive straight leg raise on the left and negative on the right        Imaging  FL spine and pain procedure    (Results Pending)         Orders Placed This Encounter   Procedures    FL spine and pain procedure

## 2021-06-29 NOTE — PATIENT INSTRUCTIONS
Gabapentin (By mouth)   Gabapentin (hamilton-a-PEN-tin)  Treats seizures and pain caused by shingles  Brand Name(s): FusePaq Fanatrex, Gralise, Neurontin   There may be other brand names for this medicine  When This Medicine Should Not Be Used: This medicine is not right for everyone  Do not use it if you had an allergic reaction to gabapentin  How to Use This Medicine:   Capsule, Liquid, Tablet  · Take your medicine as directed  Your dose may need to be changed several times to find what works best for you  If you have epilepsy, do not allow more than 12 hours to pass between doses  · Capsule: Swallow the capsule whole with plenty of water  Do not open, crush, or chew it  · Gralise® tablet: Swallow the tablet whole   Do not crush, break, or chew it  · Neurontin® tablet: If you break a tablet into 2 pieces, use the second half as your next dose  Do not use the half-tablet if the whole tablet has been cut or broken after 28 days  · Oral liquid: Measure the oral liquid medicine with a marked measuring spoon, oral syringe, or medicine cup  · This medicine should come with a Medication Guide  Ask your pharmacist for a copy if you do not have one  · Missed dose: Take a dose as soon as you remember  If it is almost time for your next dose, wait until then and take a regular dose  Do not take extra medicine to make up for a missed dose  · Store the medicine in a closed container at room temperature, away from heat, moisture, and direct light  Store the Neurontin® oral liquid in the refrigerator  Do not freeze  Drugs and Foods to Avoid:   Ask your doctor or pharmacist before using any other medicine, including over-the-counter medicines, vitamins, and herbal products  · Some medicines can affect how gabapentin works  Tell your doctor if you also using hydrocodone or morphine  · If you take an antacid, wait at least 2 hours before you take gabapentin    · Do not drink alcohol while you are using this medicine  · Tell your doctor if you use anything else that makes you sleepy  Some examples are allergy medicine, narcotic pain medicine, and alcohol  Tell your doctor if you are also using lorazepam, oxycodone, or zolpidem  Warnings While Using This Medicine:   · Tell your doctor if you are pregnant or breastfeeding, or if you have kidney problems (including patients receiving dialysis) or lung problems  Tell your doctor if you have a history of depression or mental health problems  · This medicine may cause the following problems:  ? Drug reaction with eosinophilia and systemic symptoms (DRESS) or multiorgan hypersensitivity, which may damage the liver, kidney, blood, heart, or muscles  ? Changes in mood or behavior, including suicidal thoughts or behavior  ? Respiratory depression (serious breathing problem that can be life-threatening), when used with narcotic pain medicines  · Do not stop using this medicine suddenly  Your doctor will need to slowly decrease your dose before you stop it completely  · This medicine may make you dizzy or drowsy  Do not drive or do anything else that could be dangerous until you know how this medicine affects you  · Tell any doctor or dentist who treats you that you are using this medicine  This medicine may affect certain medical test results  · Your doctor will check your progress and the effects of this medicine at regular visits  Keep all appointments  · Keep all medicine out of the reach of children  Never share your medicine with anyone    Possible Side Effects While Using This Medicine:   Call your doctor right away if you notice any of these side effects:  · Allergic reaction: Itching or hives, swelling in your face or hands, swelling or tingling in your mouth or throat, chest tightness, trouble breathing  · Behavior problems, aggression, restlessness, trouble concentrating, moodiness (especially in children)  · Blistering, peeling, red skin rash  · Blue lips, fingernails, or skin, chest pain, fast heartbeat, trouble breathing  · Change in how much or how often you urinate, bloody or cloudy urine  · Dark urine or pale stools, nausea, vomiting, loss of appetite, stomach pain, yellow skin or eyes  · Fever, chills, cough, sore throat, body aches  · Problems with coordination, shakiness, unsteadiness, unusual eye movement  · Rapid weight gain, swelling in your hands, ankles, or feet  · Rash, swollen or tender glands in the neck, armpit, or groin  · Unusual moods or behaviors, thoughts of hurting yourself, feeling depressed  If you notice these less serious side effects, talk with your doctor:   · Dizziness, drowsiness, sleepiness, tiredness  If you notice other side effects that you think are caused by this medicine, tell your doctor  Call your doctor for medical advice about side effects  You may report side effects to FDA at 6-782-FDA-1980  © Copyright [employer] 2021 Information is for End User's use only and may not be sold, redistributed or otherwise used for commercial purposes  The above information is an  only  It is not intended as medical advice for individual conditions or treatments  Talk to your doctor, nurse or pharmacist before following any medical regimen to see if it is safe and effective for you  Epidural Steroid Injection   AMBULATORY CARE:   What you need to know about an epidural steroid injection (TONYA):  An TONYA is a procedure to inject steroid medicine into the epidural space  The epidural space is between your spinal cord and vertebrae  Steroids reduce inflammation and fluid buildup in your spine that may be causing pain  You may be given pain medicine along with the steroids  How to prepare for an TONYA:  Your healthcare provider will talk to you about how to prepare for your procedure  He or she will tell you what medicines to take or not take on the day of your procedure   You may need to stop taking blood thinners or other medicines several days before your procedure  You may need to adjust any diabetes medicine you take on the day of your procedure  Steroid medicine can increase your blood sugar level  Arrange for someone to drive you home when you are discharged  What will happen during an TONYA:   · You will be given medicine to numb the procedure area  You will be awake for the procedure, but you will not feel pain  You may also be given medicine to help you relax  Contrast liquid will be used to help your healthcare provider see the area better  Tell the healthcare provider if you have ever had an allergic reaction to contrast liquid  · Your healthcare provider may place the needle into your neck area, middle of your back, or tailbone area  He may inject the medicine next to the nerves that are causing your pain  He may instead inject the medicine into a larger area of the epidural space  This helps the medicine spread to more nerves  Your healthcare provider will use a fluoroscope to help guide the needle to the right place  A fluoroscope is a type of x-ray  After the procedure, a bandage will be placed over the injection site to prevent infection  What will happen after an TONYA:  You will have a bandage over the injection site to prevent infection  Your healthcare provider will tell you when you can bathe and any activity guidelines  You will be able to go home  Risks of an TONYA:  You may have temporary or permanent nerve damage or paralysis  You may have bleeding or develop a serious infection, such as meningitis (swelling of the brain coverings)  An abscess may also develop  An abscess is a pus-filled area under the skin  You may need surgery to fix the abscess  You may have a seizure, anxiety, or trouble sleeping  If you are a man, you may have temporary erectile dysfunction (not able to have an erection)  Call your local emergency number (911 in the [address] [place] [address],[address]) if:   · You have a seizure      · You have trouble moving your legs  Seek care immediately if:   · Blood soaks through your bandage  · You have a fever or chills, severe back pain, and the procedure area is sensitive to the touch  · You cannot control when you urinate or have a bowel movement  Call your doctor if:   · You have weakness or numbness in your legs  · Your wound is red, swollen, or draining pus  · You have nausea or are vomiting  · Your face or neck is red and you feel warm  · You have more pain than you had before the procedure  · You have swelling in your hands or feet  · You have questions or concerns about your condition or care  Care for your wound as directed: You may remove the bandage before you go to bed the day of your procedure  You may take a shower, but do not take a bath for at least 24 hours  Self-care:   · Do not drive,  use machines, or do strenuous activity for 24 hours after your procedure or as directed  · Continue other treatments  as directed  Steroid injections alone will not control your pain  The injections are meant to be used with other treatments, such as physical therapy  Follow up with your doctor as directed:  Write down your questions so you remember to ask them during your visits  © Copyright [address] Information is for End User's use only and may not be sold, redistributed or otherwise used for commercial purposes  All illustrations and images included in CareNotes® are the copyrighted property of [employer]  or [facility] Viji Wise   The above information is an  only  It is not intended as medical advice for individual conditions or treatments  Talk to your doctor, nurse or pharmacist before following any medical regimen to see if it is safe and effective for you

## 2021-07-01 LAB
ALBUMIN SERPL-MCNC: 4.4 G/DL (ref 3.8–4.9)
ALBUMIN/GLOB SERPL: 1.9 {RATIO} (ref 1.2–2.2)
ALP SERPL-CCNC: 118 IU/L (ref 48–121)
ALT SERPL-CCNC: 23 IU/L (ref 0–32)
AST SERPL-CCNC: 32 IU/L (ref 0–40)
BASOPHILS # BLD AUTO: 0 X10E3/UL (ref 0–0.2)
BASOPHILS NFR BLD AUTO: 1 %
BILIRUB SERPL-MCNC: 0.3 MG/DL (ref 0–1.2)
BUN SERPL-MCNC: 18 MG/DL (ref 8–27)
BUN/CREAT SERPL: 20 (ref 12–28)
CALCIUM SERPL-MCNC: 9.6 MG/DL (ref 8.7–10.3)
CHLORIDE SERPL-SCNC: 107 MMOL/L (ref 96–106)
CO2 SERPL-SCNC: 20 MMOL/L (ref 20–29)
CREAT SERPL-MCNC: 0.88 MG/DL (ref 0.57–1)
EOSINOPHIL # BLD AUTO: 0.1 X10E3/UL (ref 0–0.4)
EOSINOPHIL NFR BLD AUTO: 1 %
ERYTHROCYTE [DISTWIDTH] IN BLOOD BY AUTOMATED COUNT: 11.9 % (ref 11.7–15.4)
GLOBULIN SER-MCNC: 2.3 G/DL (ref 1.5–4.5)
GLUCOSE SERPL-MCNC: 105 MG/DL (ref 65–99)
HCT VFR BLD AUTO: 37 % (ref 34–46.6)
HGB BLD-MCNC: 12.8 G/DL (ref 11.1–15.9)
IMM GRANULOCYTES # BLD: 0 X10E3/UL (ref 0–0.1)
IMM GRANULOCYTES NFR BLD: 0 %
LYMPHOCYTES # BLD AUTO: 2 X10E3/UL (ref 0.7–3.1)
LYMPHOCYTES NFR BLD AUTO: 34 %
MCH RBC QN AUTO: 31.4 PG (ref 26.6–33)
MCHC RBC AUTO-ENTMCNC: 34.6 G/DL (ref 31.5–35.7)
MCV RBC AUTO: 91 FL (ref 79–97)
MONOCYTES # BLD AUTO: 0.6 X10E3/UL (ref 0.1–0.9)
MONOCYTES NFR BLD AUTO: 10 %
NEUTROPHILS # BLD AUTO: 3.1 X10E3/UL (ref 1.4–7)
NEUTROPHILS NFR BLD AUTO: 54 %
PLATELET # BLD AUTO: 275 X10E3/UL (ref 150–450)
POTASSIUM SERPL-SCNC: 4.2 MMOL/L (ref 3.5–5.2)
PROT SERPL-MCNC: 6.7 G/DL (ref 6–8.5)
RBC # BLD AUTO: 4.08 X10E6/UL (ref 3.77–5.28)
SARS-COV-2 IGG SERPL QL IA: POSITIVE
SARS-COV-2 IGM SERPL QL IA: NEGATIVE
SL AMB EGFR AFRICAN AMERICAN: 83 ML/MIN/1.73
SL AMB EGFR NON AFRICAN AMERICAN: 72 ML/MIN/1.73
SODIUM SERPL-SCNC: 141 MMOL/L (ref 134–144)
WBC # BLD AUTO: 5.7 X10E3/UL (ref 3.4–10.8)

## 2021-07-07 DIAGNOSIS — R10.13 EPIGASTRIC PAIN: ICD-10-CM

## 2021-07-07 RX ORDER — OMEPRAZOLE 40 MG/1
CAPSULE, DELAYED RELEASE ORAL
Qty: 90 CAPSULE | Refills: 1 | Status: SHIPPED | OUTPATIENT
Start: 2021-07-07

## 2021-07-19 ENCOUNTER — HOSPITAL ENCOUNTER (OUTPATIENT)
Dept: RADIOLOGY | Facility: CLINIC | Age: 61
Discharge: HOME | End: 2021-07-19
Attending: ANESTHESIOLOGY | Admitting: ANESTHESIOLOGY
Payer: COMMERCIAL

## 2021-07-19 VITALS
SYSTOLIC BLOOD PRESSURE: 117 MMHG | HEART RATE: 65 BPM | DIASTOLIC BLOOD PRESSURE: 80 MMHG | RESPIRATION RATE: 20 BRPM | TEMPERATURE: 98.1 F | OXYGEN SATURATION: 98 %

## 2021-07-19 DIAGNOSIS — M54.16 LUMBAR RADICULOPATHY: ICD-10-CM

## 2021-07-19 PROCEDURE — 64484 NJX AA&/STRD TFRM EPI L/S EA: CPT | Performed by: ANESTHESIOLOGY

## 2021-07-19 PROCEDURE — 64483 NJX AA&/STRD TFRM EPI L/S 1: CPT | Performed by: ANESTHESIOLOGY

## 2021-07-19 RX ORDER — PAPAVERINE HCL 150 MG
20 CAPSULE, EXTENDED RELEASE ORAL ONCE
Status: COMPLETED | OUTPATIENT
Start: 2021-07-19 | End: 2021-07-19

## 2021-07-19 RX ADMIN — IOHEXOL 2 ML: 300 INJECTION, SOLUTION INTRAVENOUS at 14:32

## 2021-07-19 RX ADMIN — LIDOCAINE HYDROCHLORIDE 2 ML: 20 INJECTION, SOLUTION EPIDURAL; INFILTRATION; INTRACAUDAL; PERINEURAL at 14:33

## 2021-07-19 RX ADMIN — DEXAMETHASONE SODIUM PHOSPHATE 15 MG: 10 INJECTION, SOLUTION INTRAMUSCULAR; INTRAVENOUS at 14:33

## 2021-07-19 NOTE — H&P
History of Present Illness: The patient is a 64 y o  female who presents with complaints of  Low back and leg pain      Patient Active Problem List   Diagnosis    Rheumatoid arthritis of multiple sites with negative rheumatoid factor (HCC)    Primary insomnia    [facility]    Radiculopathy, lumbar region    Lumbar disc herniation    Lumbar spondylosis    Hiatal hernia    Colon cancer screening       Past Medical History:   Diagnosis Date    Allergic rhinitis     Anxiety     Colon polyp     Depression     Epigastric pain     from hiatal hernia    Hiatal hernia     Insomnia     Iron deficiency anemia     Lumbar spondylosis 9/25/2020    Osteoporosis     [facility]     Rheumatoid arthritis (HCC)        Past Surgical History:   Procedure Laterality Date    ABDOMINAL SURGERY      "tummy tuck"    AUGMENTATION MAMMAPLASTY Bilateral 2010    BREAST BIOPSY Right 2008    benign    COLONOSCOPY      KNEE ARTHROSCOPY Bilateral     LASIK      SINUS SURGERY           Current Outpatient Medications:     bisacodyl (DULCOLAX) 5 mg EC tablet, Take 10 mg by mouth once, Disp: , Rfl:     buPROPion (WELLBUTRIN XL) 300 mg 24 hr tablet, TAKE 1 TABLET EVERY MORNING (Patient taking differently: 300 mg every other day ), Disp: 90 tablet, Rfl: 3    Calcium Carbonate-Vit D-Min (CALCIUM 1200 PO), Take by mouth every morning, Disp: , Rfl:     Enbrel SureClick 50 MG/ML injection, INJECT 1 PEN UNDER THE SKIN EVERY 7 DAYS , Disp: 4 mL, Rfl: 5    Ferrous Sulfate (IRON PO), Take by mouth every morning Last dose 3/29/21, Disp: , Rfl:     folic acid (FOLVITE) 1 mg tablet, Take 1 mg by mouth daily , Disp: , Rfl:     gabapentin (NEURONTIN) 300 mg capsule, Take 1 PO HS x 5 days, then 1 PO BID x 5 days, then 1 PO TID, Disp: 90 capsule, Rfl: 1    levocetirizine (XYZAL) 5 MG tablet, Take 5 mg by mouth daily as needed for allergies, Disp: , Rfl:    methotrexate 2 5 mg tablet, Take 6 tablets (15 mg total) by mouth once a week (Patient taking differently: Take 12 5 mg by mouth once a week On Thursday), Disp: 72 tablet, Rfl: 1    multivitamin (THERAGRAN) TABS, Take 1 tablet by mouth daily Last dose 3/29/21, Disp: , Rfl:     omeprazole (PriLOSEC) 40 MG capsule, TAKE 1 CAPSULE BY MOUTH EVERY DAY, Disp: 90 capsule, Rfl: 1    Polyethylene Glycol 3350 (MIRALAX PO), Take by mouth once, Disp: , Rfl:     sertraline (ZOLOFT) 100 mg tablet, TAKE 1 AND 1/2 TABLETS     (150MG TOTAL) DAILY (Patient taking differently: 150 mg every morning ), Disp: 135 tablet, Rfl: 3    Triamcinolone Acetonide (NASACORT ALLERGY 24HR) 55 MCG/ACT AERO, 2 sprays each nostril daily (Patient taking differently: daily at bedtime 2 sprays each nostril daily), Disp: 16 5 g, Rfl: 3    VITAMIN D PO, Take 10,000 Units by mouth every morning, Disp: , Rfl:     zolpidem (AMBIEN) 10 mg tablet, TAKE 1 TABLET BY MOUTH DAILY AT BEDTIME AS NEEDED FOR SLEEP, Disp: 90 tablet, Rfl: 0    Current Facility-Administered Medications:     dexamethasone (PF) (DECADRON) injection 20 mg, 20 mg, Epidural, Once, Ryan Ramon, DO    iohexol (OMNIPAQUE) 300 mg/mL injection 50 mL, 50 mL, Epidural, Once, Ryan Ramon, DO    lidocaine (PF) (XYLOCAINE-MPF) 2 % injection 2 mL, 2 mL, Epidural, Once, Ryan Ramon, DO    Allergies   Allergen Reactions    Plaquenil [Hydroxychloroquine] Rash       Physical Exam:   Vitals:    07/19/21 1405   BP: 114/75   Pulse: 68   Resp: 20   Temp: 98 1 °F (36 7 °C)   SpO2: 98%     General: Awake, Alert, Oriented x 3, Mood and affect appropriate  Respiratory: Respirations even and unlabored  Cardiovascular: Peripheral pulses intact; no edema  Musculoskeletal Exam:   Left lumbar paraspinals tender to palpation  ASA Score: 3    Patient/Chart Verification  Patient ID Verified: Verbal  ID Band Applied: No  Consents Confirmed: Procedural  H&P( within 30 days) Verified:  To be obtained in the Pre-Procedure area  Interval H&P(within 24 hr) Complete (required for Outpatients and Surgery Admit only): To be obtained in the Pre-Procedure area  Allergies Reviewed: Yes  Anticoag/NSAID held?: No  Currently on antibiotics?: No  Pre-op Lab/Test Results Available: N/A  Pregnancy Lab Collected: N/A comment    Assessment:   1   Lumbar radiculopathy        Plan: Left L4 and L5 TFESI

## 2021-07-19 NOTE — DISCHARGE INSTRUCTIONS
Epidural Steroid Injection   WHAT YOU NEED TO KNOW:   An epidural steroid injection (TONYA) is a procedure to inject steroid medicine into the epidural space  The epidural space is between your spinal cord and vertebrae  Steroids reduce inflammation and fluid buildup in your spine that may be causing pain  You may be given pain medicine along with the steroids  ACTIVITY  · Do not drive or operate machinery today  · No strenuous activity today - bending, lifting, etc   · You may resume normal activites starting tomorrow - start slowly and as tolerated  · You may shower today, but no tub baths or hot tubs  · You may have numbness for several hours from the local anesthetic  Please use caution and common sense, especially with weight-bearing activities  CARE OF THE INJECTION SITE  · If you have soreness or pain, apply ice to the area today (20 minutes on/20 minutes off)  · Starting tomorrow, you may use warm, moist heat or ice if needed  · You may have an increase or change in your discomfort for 36-48 hours after your treatment  · Apply ice and continue with any pain medication you have been prescribed  · Notify [facility] if you have any of the following: redness, drainage, swelling, headache, stiff neck or fever above 100°F     SPECIAL INSTRUCTIONS  · Our office will contact you in approximately 7 days for a progress report  MEDICATIONS  · Continue to take all routine medications  · Our office may have instructed you to hold some medications  As no general anesthesia was used in today's procedure, you should not experience any side effects related to anesthesia  If you have a problem specifically related to your procedure, please call our office at [phone]  Problems not related to your procedure should be directed to your primary care physician

## 2021-07-26 ENCOUNTER — OFFICE VISIT (OUTPATIENT)
Dept: FAMILY MEDICINE CLINIC | Facility: CLINIC | Age: 61
End: 2021-07-26
Payer: COMMERCIAL

## 2021-07-26 ENCOUNTER — TELEPHONE (OUTPATIENT)
Dept: PAIN MEDICINE | Facility: CLINIC | Age: 61
End: 2021-07-26

## 2021-07-26 VITALS
SYSTOLIC BLOOD PRESSURE: 120 MMHG | BODY MASS INDEX: 28.02 KG/M2 | RESPIRATION RATE: 15 BRPM | HEIGHT: 65 IN | OXYGEN SATURATION: 98 % | DIASTOLIC BLOOD PRESSURE: 80 MMHG | TEMPERATURE: 97.9 F | HEART RATE: 68 BPM | WEIGHT: 168.2 LBS

## 2021-07-26 DIAGNOSIS — Z12.31 VISIT FOR SCREENING MAMMOGRAM: ICD-10-CM

## 2021-07-26 DIAGNOSIS — M06.09 RHEUMATOID ARTHRITIS OF MULTIPLE SITES WITH NEGATIVE RHEUMATOID FACTOR (HCC): ICD-10-CM

## 2021-07-26 DIAGNOSIS — M51.26 LUMBAR DISC HERNIATION: ICD-10-CM

## 2021-07-26 DIAGNOSIS — F33.42 RECURRENT MAJOR DEPRESSIVE DISORDER, IN FULL REMISSION (HCC): ICD-10-CM

## 2021-07-26 DIAGNOSIS — Z00.00 ANNUAL PHYSICAL EXAM: Primary | ICD-10-CM

## 2021-07-26 PROCEDURE — 3008F BODY MASS INDEX DOCD: CPT | Performed by: FAMILY MEDICINE

## 2021-07-26 PROCEDURE — 99396 PREV VISIT EST AGE 40-64: CPT | Performed by: FAMILY MEDICINE

## 2021-07-26 PROCEDURE — 1036F TOBACCO NON-USER: CPT | Performed by: FAMILY MEDICINE

## 2021-07-26 RX ORDER — SERTRALINE HYDROCHLORIDE 100 MG/1
100 TABLET, FILM COATED ORAL DAILY
Qty: 90 TABLET
Start: 2021-07-26 | End: 2022-02-16 | Stop reason: SDUPTHER

## 2021-07-26 NOTE — PROGRESS NOTES
[address]    NAME: Hermelinda Song  AGE: 64 y o  SEX: female  : 1960     DATE: 2021     Assessment and Plan:     Problem List Items Addressed This Visit        Musculoskeletal and Integument    Rheumatoid arthritis of multiple sites with negative rheumatoid factor ([facility] )     Sees rheum  On enbrel   Stopped methotrexate 2021 since CRP is improving per rheumatologist          Lumbar disc herniation       Other    Recurrent major depressive disorder, in full remission ([facility] )    Relevant Medications    sertraline (ZOLOFT) 100 mg tablet      Other Visit Diagnoses     Annual physical exam    -  Primary    Visit for screening mammogram        Relevant Orders    Mammo screening bilateral w cad          Immunizations and preventive care screenings were discussed with patient today  Appropriate education was printed on patient's after visit summary  Counseling:  Alcohol/drug use: discussed moderation in alcohol intake, the recommendations for healthy alcohol use, and avoidance of illicit drug use  Dental Health: discussed importance of regular tooth brushing, flossing, and dental visits  Injury prevention: discussed safety/seat belts, safety helmets, smoke detectors, carbon dioxide detectors, and smoking near bedding or upholstery  Sexual health: discussed sexually transmitted diseases, partner selection, use of condoms, avoidance of unintended pregnancy, and contraceptive alternatives  · Exercise: the importance of regular exercise/physical activity was discussed  Recommend exercise 3-5 times per week for at least 30 minutes  BMI Counseling: Body mass index is 27 99 kg/m²  The BMI is above normal  Nutrition recommendations include decreasing portion sizes and encouraging healthy choices of fruits and vegetables  Exercise recommendations include moderate physical activity 150 minutes/week   No pharmacotherapy was ordered  No follow-ups on file  Chief Complaint:     Chief Complaint   Patient presents with    Physical Exam     Patient is here today for an annual exam       History of Present Illness:     Adult Annual Physical   Patient here for a comprehensive physical exam  The patient reports no problems  Hard time losing weight   Snacking on sweets   Breakfast: granola and fruits   Lunch: snacks ,  Dinner : w  Diet and Physical Activity  · Diet/Nutrition: well balanced diet  · Exercise: walking  Depression Screening  PHQ-9 Depression Screening    PHQ-9:   Frequency of the following problems over the past two weeks:           General Health  · Sleep: sleeps well and gets 7-8 hours of sleep on average  · Hearing: normal - bilateral   · Vision: goes for regular eye exams, most recent eye exam <1 year ago and wears glasses  · Dental: regular dental visits and brushes teeth twice daily  /GYN Health  · Patient is: postmenopausal  · Last menstrual period: 11 years ago   · Contraceptive method: none  Review of Systems:     Review of Systems   Constitutional: Negative  HENT: Negative  Eyes: Negative  Respiratory: Negative  Cardiovascular: Negative  Gastrointestinal: Negative  Endocrine: Negative  Genitourinary: Negative  Musculoskeletal: Negative  Skin: Negative  Allergic/Immunologic: Negative  Neurological: Negative  Hematological: Negative  Psychiatric/Behavioral: Negative         Past Medical History:     Past Medical History:   Diagnosis Date    Allergic rhinitis     Anxiety     Colon polyp     Depression     Epigastric pain     from hiatal hernia    Hiatal hernia     Insomnia     Iron deficiency anemia     Lumbar spondylosis 9/25/2020    Osteoporosis     RA (rheumatoid arthritis) (HCC)     Rheumatoid arthritis (HCC)       Past Surgical History:     Past Surgical History:   Procedure Laterality Date    ABDOMINAL SURGERY      "tummy tuck"  AUGMENTATION MAMMAPLASTY Bilateral 2010    BREAST BIOPSY Right 2008    benign    COLONOSCOPY      KNEE ARTHROSCOPY Bilateral     LASIK      SINUS SURGERY        Social History:     Social History     Socioeconomic History    Marital status: /Civil Union     Spouse name: None    Number of children: None    Years of education: None    Highest education level: None   Occupational History    None   Tobacco Use    Smoking status: Never Smoker    Smokeless tobacco: Never Used   Vaping Use    Vaping Use: Never used   Substance and Sexual Activity    Alcohol use: Yes     Alcohol/week: 2 0 standard drinks     Types: 2 Glasses of wine per week    Drug use: Not Currently    Sexual activity: None   Other Topics Concern    None   Social History Narrative    None     Social Determinants of Health     Financial Resource Strain:     Difficulty of Paying Living Expenses:    Food Insecurity:     Worried About Running Out of Food in the Last Year:     Ran Out of Food in the Last Year:    Transportation Needs:     Lack of Transportation (Medical):      Lack of Transportation (Non-Medical):    Physical Activity:     Days of Exercise per Week:     Minutes of Exercise per Session:    Stress:     Feeling of Stress :    Social Connections:     Frequency of Communication with Friends and Family:     Frequency of Social Gatherings with Friends and Family:     Attends Synagogue Services:     Active Member of Clubs or Organizations:     Attends Club or Organization Meetings:     Marital Status:    Intimate Partner Violence:     Fear of Current or Ex-Partner:     Emotionally Abused:     Physically Abused:     Sexually Abused:       Family History:     Family History   Problem Relation Age of Onset    Hypertension Mother     Cancer Mother         breast    Stroke Mother     Crohn's disease Mother     Hyperlipidemia Mother     Heart attack Mother     Breast cancer Mother 79    Heart disease Mother         MI    Hypertension Father     Stroke Father     Hyperlipidemia Father     Heart attack Father         ?  Other Sister         breast watch    Hyperlipidemia Brother       Current Medications:     Current Outpatient Medications   Medication Sig Dispense Refill    Calcium Carbonate-Vit D-Min (CALCIUM 1200 PO) Take by mouth every morning      Enbrel SureClick 50 MG/ML injection INJECT 1 PEN UNDER THE SKIN EVERY 7 DAYS  4 mL 5    Ferrous Sulfate (IRON PO) Take by mouth every morning Last dose 5/32/97      folic acid (FOLVITE) 1 mg tablet Take 1 mg by mouth daily       gabapentin (NEURONTIN) 300 mg capsule Take 1 PO HS x 5 days, then 1 PO BID x 5 days, then 1 PO TID 90 capsule 1    levocetirizine (XYZAL) 5 MG tablet Take 5 mg by mouth daily as needed for allergies      multivitamin (THERAGRAN) TABS Take 1 tablet by mouth daily Last dose 3/29/21      sertraline (ZOLOFT) 100 mg tablet Take 1 tablet (100 mg total) by mouth daily 90 tablet     Triamcinolone Acetonide (NASACORT ALLERGY 24HR) 55 MCG/ACT AERO 2 sprays each nostril daily (Patient taking differently: daily at bedtime 2 sprays each nostril daily) 16 5 g 3    VITAMIN D PO Take 10,000 Units by mouth every morning      zolpidem (AMBIEN) 10 mg tablet TAKE 1 TABLET BY MOUTH DAILY AT BEDTIME AS NEEDED FOR SLEEP 90 tablet 0    omeprazole (PriLOSEC) 40 MG capsule TAKE 1 CAPSULE BY MOUTH EVERY DAY (Patient not taking: Reported on 7/26/2021) 90 capsule 1     No current facility-administered medications for this visit  Allergies: Allergies   Allergen Reactions    Plaquenil [Hydroxychloroquine] Rash      Physical Exam:     /80 (BP Location: Left arm, Patient Position: Sitting, Cuff Size: Adult)   Pulse 68   Temp 97 9 °F (36 6 °C) (Tympanic)   Resp 15   Ht 5' 5" (1 651 m)   Wt 76 3 kg (168 lb 3 2 oz)   SpO2 98%   BMI 27 99 kg/m²     Physical Exam  Vitals and nursing note reviewed     Constitutional:       General: She is not in acute distress  Appearance: She is well-developed  HENT:      Head: Normocephalic and atraumatic  Right Ear: Tympanic membrane normal       Left Ear: Tympanic membrane normal       Nose: Nose normal       Mouth/Throat:      Mouth: Mucous membranes are moist    Eyes:      Conjunctiva/sclera: Conjunctivae normal    Cardiovascular:      Rate and Rhythm: Normal rate and regular rhythm  Heart sounds: No murmur heard  Pulmonary:      Effort: Pulmonary effort is normal  No respiratory distress  Breath sounds: Normal breath sounds  Abdominal:      Palpations: Abdomen is soft  Tenderness: There is no abdominal tenderness  Musculoskeletal:      Cervical back: Neck supple  Skin:     General: Skin is warm and dry  Capillary Refill: Capillary refill takes less than 2 seconds  Neurological:      General: No focal deficit present  Mental Status: She is alert and oriented to person, place, and time     Psychiatric:         Mood and Affect: Mood normal          Behavior: Behavior normal           Gilberto Orantes MD  [id number] [facility]

## 2021-07-26 NOTE — PATIENT INSTRUCTIONS

## 2021-08-10 NOTE — TELEPHONE ENCOUNTER
RN attempted to contact pt  Detailed VMMLOM (ok per SHANITA) advising of same    Provided with CB# and OH

## 2021-08-17 ENCOUNTER — OFFICE VISIT (OUTPATIENT)
Dept: PAIN MEDICINE | Facility: CLINIC | Age: 61
End: 2021-08-17
Payer: COMMERCIAL

## 2021-08-17 VITALS
BODY MASS INDEX: 27.99 KG/M2 | SYSTOLIC BLOOD PRESSURE: 130 MMHG | HEIGHT: 65 IN | HEART RATE: 69 BPM | DIASTOLIC BLOOD PRESSURE: 70 MMHG

## 2021-08-17 DIAGNOSIS — M54.16 LUMBAR RADICULOPATHY: ICD-10-CM

## 2021-08-17 DIAGNOSIS — G89.4 CHRONIC PAIN SYNDROME: Primary | ICD-10-CM

## 2021-08-17 DIAGNOSIS — M51.26 LUMBAR DISC HERNIATION: ICD-10-CM

## 2021-08-17 DIAGNOSIS — M47.816 LUMBAR SPONDYLOSIS: ICD-10-CM

## 2021-08-17 PROCEDURE — 1036F TOBACCO NON-USER: CPT | Performed by: NURSE PRACTITIONER

## 2021-08-17 PROCEDURE — 99214 OFFICE O/P EST MOD 30 MIN: CPT | Performed by: NURSE PRACTITIONER

## 2021-08-17 RX ORDER — GABAPENTIN 300 MG/1
600 CAPSULE ORAL 3 TIMES DAILY
Qty: 180 CAPSULE | Refills: 1 | Status: SHIPPED | OUTPATIENT
Start: 2021-08-17 | End: 2021-10-18 | Stop reason: SDUPTHER

## 2021-08-17 NOTE — PATIENT INSTRUCTIONS
Gabapentin 300mg (1 capsule)  Take 1 capsule in the morning, 1 capsule in the afternoon and 2 capsules at bedtime x 5 days  Then 2 capsules in the morning, 1 capsule in the afternoon and 2 capsules at bedtime x 5 days  Then increase to 2 capsules three times a day and stay on this dose until next OV

## 2021-08-17 NOTE — PROGRESS NOTES
Assessment:  1  Chronic pain syndrome    2  Lumbar radiculopathy    3  Lumbar disc herniation    4  Lumbar spondylosis        Plan:  1  I offered to schedule the patient for repeat left L4 and L5 TFESI, however she declines at time   2  I will increase gabapentin to 600 mg 3 times a day for ongoing neuropathic complaints in the left lower extremity  The patient was provided with instructions on how to increase appropriately during today's office visit  I advised the patient that if they experience any side effects or issues with the changes in their medication regiment, they should give our office a call to discuss  I also advised the patient not to drive or operate machinery until they see how the changes in the medication regimen affects them  The patient was agreeable and verbalized an understanding  3  I will avoid NSAIDs secondary to GI upset   4  The patient will continue with her home exercise program  5  The patient may continue Tylenol p r n  should not exceed more than 3000 mg in 24 hours   6  The patient will follow-up in 4 weeks or sooner if needed     M*Modal software was used to dictate this note  It may contain errors with dictating incorrect words or incorrect spelling  Please contact the provider directly with any questions  History of Present Illness: The patient is a 64 y o  female with a history of rheumatoid arthritis last seen on 6/29/21 who presents for a follow up office visit in regards to chronic low back pain that radiates into the lateral aspect of the left lower extremity to the foot secondary to  Lumbar degenerative disc disease, lumbar spondylosis, lumbar stenosis, lumbar radiculopathy and chronic pain syndrome  The patient  Denies bowel or bladder incontinence or saddle anesthesia  The patient's right lower extremity symptoms have been resolved since previous right L5 and S1 TFESI in 2018   She is taking gabapentin 300 mg 3 times a day without any significant improvement of her pain  She is also status post left L4 and L5 TFESI with Dr Carson Rich on July 19, 2021 with about 30% improvement of her pain from this procedure  She currently rates her pain a 7/10 on the numeric pain rating scale  She states her pain is intermittent in nature and follows no particular pattern throughout the day  She characterizes the pain as dull aching, sharp, numbness and pins and needles    I have personally reviewed and/or updated the patient's past medical history, past surgical history, family history, social history, current medications, allergies, and vital signs today  Review of Systems:    Review of Systems   Respiratory: Negative for shortness of breath  Cardiovascular: Negative for chest pain  Gastrointestinal: Negative for constipation, diarrhea, nausea and vomiting  Musculoskeletal: Positive for gait problem  Negative for arthralgias, joint swelling and myalgias  Skin: Negative for rash  Neurological: Negative for dizziness, seizures and weakness  All other systems reviewed and are negative          Past Medical History:   Diagnosis Date    Allergic rhinitis     Anxiety     Colon polyp     Depression     Epigastric pain     from hiatal hernia    Hiatal hernia     Insomnia     Iron deficiency anemia     Lumbar spondylosis 9/25/2020    Osteoporosis     RA (rheumatoid arthritis) (HCC)     Rheumatoid arthritis (HCC)        Past Surgical History:   Procedure Laterality Date    ABDOMINAL SURGERY      "tummy tuck"    AUGMENTATION MAMMAPLASTY Bilateral 2010    BREAST BIOPSY Right 2008    benign    COLONOSCOPY      KNEE ARTHROSCOPY Bilateral     LASIK      SINUS SURGERY         Family History   Problem Relation Age of Onset    Hypertension Mother     Cancer Mother         breast    Stroke Mother     Crohn's disease Mother     Hyperlipidemia Mother     Heart attack Mother     Breast cancer Mother 79    Heart disease Mother         MI    Hypertension Father  Stroke Father     Hyperlipidemia Father     Heart attack Father         ?  Other Sister         breast watch    Hyperlipidemia Brother        Social History     Occupational History    Not on file   Tobacco Use    Smoking status: Never Smoker    Smokeless tobacco: Never Used   Vaping Use    Vaping Use: Never used   Substance and Sexual Activity    Alcohol use:  Yes     Alcohol/week: 2 0 standard drinks     Types: 2 Glasses of wine per week    Drug use: Not Currently    Sexual activity: Not on file         Current Outpatient Medications:     Calcium Carbonate-Vit D-Min (CALCIUM 1200 PO), Take by mouth every morning, Disp: , Rfl:     Enbrel SureClick 50 MG/ML injection, INJECT 1 PEN UNDER THE SKIN EVERY 7 DAYS , Disp: 4 mL, Rfl: 5    Ferrous Sulfate (IRON PO), Take by mouth every morning Last dose 3/29/21, Disp: , Rfl:     folic acid (FOLVITE) 1 mg tablet, Take 1 mg by mouth daily , Disp: , Rfl:     gabapentin (NEURONTIN) 300 mg capsule, Take 2 capsules (600 mg total) by mouth 3 (three) times a day, Disp: 180 capsule, Rfl: 1    Insulin Pen Needle (NovoFine) 32G X 6 MM MISC, Use daily, Disp: 90 each, Rfl: 3    levocetirizine (XYZAL) 5 MG tablet, Take 5 mg by mouth daily as needed for allergies, Disp: , Rfl:     liraglutide (SAXENDA) injection, Take 0 6 mg daily and go up 0 6 mg every week until reaches 3 mg, Disp: 3 mL, Rfl: 0    multivitamin (THERAGRAN) TABS, Take 1 tablet by mouth daily Last dose 3/29/21, Disp: , Rfl:     omeprazole (PriLOSEC) 40 MG capsule, TAKE 1 CAPSULE BY MOUTH EVERY DAY (Patient not taking: Reported on 7/26/2021), Disp: 90 capsule, Rfl: 1    sertraline (ZOLOFT) 100 mg tablet, Take 1 tablet (100 mg total) by mouth daily, Disp: 90 tablet, Rfl:     Triamcinolone Acetonide (NASACORT ALLERGY 24HR) 55 MCG/ACT AERO, 2 sprays each nostril daily (Patient taking differently: daily at bedtime 2 sprays each nostril daily), Disp: 16 5 g, Rfl: 3    VITAMIN D PO, Take 10,000 Units by mouth every morning, Disp: , Rfl:     zolpidem (AMBIEN) 10 mg tablet, TAKE 1 TABLET BY MOUTH DAILY AT BEDTIME AS NEEDED FOR SLEEP, Disp: 90 tablet, Rfl: 0    Allergies   Allergen Reactions    Plaquenil [Hydroxychloroquine] Rash       Physical Exam:    /70   Pulse 69   Ht 5' 5" (1 651 m)   BMI 27 99 kg/m²     Constitutional:normal, well developed, well nourished, alert, in no distress and non-toxic and no overt pain behavior  Eyes:anicteric  HEENT:grossly intact  Neck:supple, symmetric, trachea midline and no masses   Pulmonary:even and unlabored  Cardiovascular:No edema or pitting edema present  Skin:Normal without rashes or lesions and well hydrated  Psychiatric:Mood and affect appropriate  Neurologic:Cranial Nerves II-XII grossly intact  Musculoskeletal:Slightly antalgic gait but steady without the use of assistive devices  Equivocal seated straight leg raise on the left and negative on the right      Imaging  No orders to display         No orders of the defined types were placed in this encounter

## 2021-09-08 NOTE — PROGRESS NOTES
Assessment and Plan:   Ms Juventino Siddiqi a 64year-old female with history significant for rheumatoid arthritis, osteopenia and lumbar degenerative arthritis, who presents for follow-up   She is currently on subcutaneous Enbrel 50 mg weekly and methotrexate 5 tablets once weekly        # Seronegative rheumatoid arthritis  - Hermelinda presents today for follow-up of rheumatoid arthritis, and has been well controlled on a regimen of subcutaneous Enbrel 50 mg weekly and oral methotrexate 5 tablets weekly with folic acid 1 mg daily  She will continue with this regimen unchanged  She is due for high risk medication lab monitoring today and again in 3-4 months   - Of note she did try to discontinue the methotrexate and noticed worsening hand stiffness and swelling, so I advised her to remain on the current dose of methotrexate at 12 5 mg once weekly      # Lumbar DJD  - Continue follow up with Pain Management as needed   She is currently on gabapentin 600 mg 3 times daily which helps to some degree      # Osteopenia  - We previously reviewed her DEXA scan in February 2020 which is consistent with osteopenia, but shows a slightly increased risk of major osteoporotic fractures based on the FRAX tool   I advised her that as her DEXA scan overall appears to be stable without a history of fragility fractures, we do have the option to continue monitoring for now with a repeat DEXA scan in the next 1-2 years (plan for 2/2022)   On the other hand I also discussed with her the rationale behind initiating medications such as antiresorptives (bisphosphonate therapy)   We briefly discussed alendronate as a first-line option and I reviewed the side effects with her including but not limited to, risk for arthralgias, GI upset, osteonecrosis of the jaw or atypical fractures   She would like to hold off on any specific osteoporosis medications for now and discuss further after the next DEXA scan is done    - I advised her in the interim to continue daily calcium and vitamin-D supplements  Plan:  Diagnoses and all orders for this visit:    Seronegative rheumatoid arthritis ([facility] [address] )    Long-term use of immunosuppressant medication    Methotrexate, long term, current use  -     CBC and differential; Standing  -     Comprehensive metabolic panel; Standing  -     CBC and differential  -     Comprehensive metabolic panel  -     CBC and differential; Future  -     Comprehensive metabolic panel; Future    Osteoarthritis of lumbar spine, unspecified spinal osteoarthritis complication status    Osteopenia, unspecified location    At high risk for fracture    Other orders  -     Cancel: C-reactive protein; Future  -     Cancel: Sedimentation rate, automated; Future      Activities as tolerated  Exercise: try to maintain a low impact exercise regimen as much as possible  Walk for 30 minutes a day for at least 3 days a week  Continue other medications as prescribed by PCP and other specialists  RTC in 8 months          HPI    INITIAL VISIT NOTE:  Ms Wong Pradhan a 20-year-old female with history significant for rheumatoid arthritis, osteopenia and lumbar degenerative arthritis, who presents for further management of rheumatoid arthritis and establishing with [facility]  Jeannine Christianson is currently on subcutaneous Enbrel 50 mg weekly and methotrexate 8 tablets weekly  Jeannine Christianson is referred by Dr Lacho Trivedi     Patient reports she was diagnosed with rheumatoid arthritis at the age of 27 when she initially presented with left knee pain and swelling   She states since then it has gradually progressed to involve multiple joints   She has tried multiple medications including hydroxychloroquine which caused a skin rash, gold injections, sulfasalazine and possibly other injectables although she cannot recall the names  Jeannine Christianson has been well maintained on a combination of subcutaneous Enbrel 50 mg weekly and methotrexate 8 tablets weekly with folic acid 1 mg daily for the past 18-20 years  Sanchez Almazan has not required to change her medications   She states overall her joint pains have been well controlled although on occasion she will notice an achiness sensation involving her hands, wrists and elbows   Intermittently she will notice swelling affecting her hands and feet   She also experiences morning stiffness primarily involving her hands and feet, and states the feet stiffness resolves right away but the hand stiffness persists for less than 30 minutes   She is currently not taking any steroids or over-the-counter pain medications   She refrains from NSAID use due to a history of esophagitis  Sanchez Almazan mentions previously she had been on chronic courses of steroids but has not required this in the past few years   Within the last year she has been dealing with increasing pain affecting her lower back radiating into her bilateral legs associated with a sensation of weakness  Sanchez Almazan was seen by Orthopedics in [place] and apparently had imaging done which showed osteoarthritic changes affecting her lumbar spine   She did complete a course of physical therapy prior to relocating to this area  Sanchez Almazan is not interested in seeing the [facility] at this time  Sanchez Almazan manages her pain with Tylenol as needed      Overall she has been tolerating the Enbrel and methotrexate well without any concerns for side effects   She denies fevers, chills, unintentional weight loss, mouth ulcers, skin rash or GI issues   No other complaints noted at this time         9/9/2019:  Patient presents for a follow-up today  Sanchez Almazan is currently on subcutaneous Enbrel 50 mg weekly and oral methotrexate 8 tablets weekly with folic acid 1 mg daily      We reviewed her labs done following the prior office visit which revealed an unremarkable CBC, CMP, ESR, CRP, chronic hepatitis panel, QuantiFERON TB gold, vitamin-D, rheumatoid factor and anti CCP antibody   X-rays of her bilateral hands and feet did not show any inflammatory changes, but showed very minimal degenerative changes      She reports overall being stable on her current regimen of the Enbrel and methotrexate   She has not had any flare-ups of joint pain or swelling   Only on occasion will she experience mild pain around her hips and knees   She does experience morning stiffness which affects her diffusely, but lasts only a few minutes   She has not had to take any over-the-counter pain medications for her joint pains   She has been tolerating both medications well without any concerns for side effects or recurrent infections   When she does experience an achy sensation in her bilateral legs she does take Tylenol if needed      She is unsure when her last bone density exam was, but thinks it may have been more than 2 years ago  Yasir Jones does take calcium and vitamin-D supplements daily for a history of osteopenia      She denies any other complaints at this time         1/10/2020:  Patient presents for follow-up of seronegative rheumatoid arthritis  Yasir Jones is currently on oral methotrexate at 7 tablets once weekly with folic acid 1 mg daily, and Enbrel 50 mg once weekly   We reviewed her labs done following the prior office visit which showed an unremarkable CBC and CMP      She reports overall with decreasing her methotrexate from 8-7 tablets weekly there was no flare-up noted in her joint pains   She is currently denying any joint pain or swelling   She experiences 1 minutes of morning stiffness affecting her hands and feet   She has not had to take any over-the-counter pain medications   She has overall been tolerating the methotrexate and Enbrel well without any concerns for side effects or infections      She is yet to schedule the DEXA scan   She does take calcium and vitamin-D supplements daily         5/18/2020:  Patient presents for a follow-up of seronegative rheumatoid arthritis  Yasir Jones is currently on oral methotrexate at 6 tablets once weekly with folic acid 1 mg daily, and Enbrel 50 mg once weekly  Clement Hector is due to get her high risk medication lab monitoring done   We reviewed her DEXA scan done in February 2020 which showed osteopenia, but her 10 year risk of major osteoporotic fracture as calculated by the FRAX score was slightly elevated at 23 8%      In terms of the rheumatoid arthritis, she reports that she is overall doing well at this time and denies any significant concerns for joint flare-ups including pain, swelling or stiffness   She is tolerating her regimen well without any concerns for side effects or infections   Over the past 2 months she has noticed left elbow pain which was initially sharp, but now she describes it as a dull constant pain   She states that activities such as lifting heavy items will worsen the pain and she feels slightly decreased strength in that arm   She does take Tylenol as needed which helps      For the osteopenia history, she does take daily calcium supplements but not vitamin-D   She denies a history of fragility fractures         9/21/2020:  Patient presents for a follow-up of seronegative rheumatoid arthritis  Clement Hector is currently on oral methotrexate at 6 tablets once weekly with folic acid 1 mg daily, and Enbrel 50 mg once weekly   I reviewed her labs from July which were normal      She reports in terms of the rheumatoid arthritis she has been doing very well and denies any joint pains or swelling   She does experience morning stiffness of her hands which takes a few minutes to improve   She has been tolerating the methotrexate and Enbrel well without any concerns for side effects or infections      Over the past few months she has been dealing with low back pain with radiation down her right leg for which she contacted me  Clement Hector was referred to Orthopedics and had an MRI of her lumbar spine done which showed multilevel disc herniations and bulges   She is going to be seeing pain management on Friday to discuss interventional procedures      For the osteopenia history she is on daily calcium and vitamin-D supplements  Nolvia Lang denies a history of fragility fractures         3/22/2021:  Patient presents for a follow-up of seronegative rheumatoid arthritis  Nolvia Lang is currently on oral methotrexate at 5-6 tablets once weekly with folic acid 1 mg daily, and Enbrel 50 mg once weekly   I reviewed her labs from December which were normal      She reports overall from a rheumatoid arthritis perspective she has been doing well  She did have an episode of pain affecting her left elbow which resolved, but it may still be sensitive to light touch  No other flare-ups of joint pains or swelling  She does experience morning stiffness which mostly affects her hands and resolves within a few minutes  She has not been taking any over-the-counter NSAIDs  She has tolerated the methotrexate and Enbrel well without any concerns for side effects or infections      She did follow with Orthopedics for the low back pain with right-sided radicular symptoms for which she was seen by pain management and had a steroid injection done which provided her with good relief  She reports similar symptoms now occurring down her left leg      For the osteopenia history she is on daily calcium and vitamin-D supplements   She denies a history of fragility fractures  9/9/2021:  Patient presents for a follow-up of seronegative rheumatoid arthritis  Nolvia Lang is currently on oral methotrexate at 5 tablets once weekly with folic acid 1 mg daily, and Enbrel 50 mg once weekly   She has not had recent high risk medication lab monitoring done  She reports overall from a rheumatoid arthritis perspective she has been stable  She did try to wean off the methotrexate and states that when she discontinued it she noticed worsening stiffness and swelling in her hands    In view of this she did restart the methotrexate at 5 tablets once weekly and states that the stiffness has improved as well as the swelling but she is still feeling like her knuckles (PIPs) are big  Otherwise no significant peripheral joint pains or swelling  She does experience morning stiffness in her ankles and feet that takes less than an hour to improve  She is not taking any over-the-counter pain medications  She has tolerated the methotrexate and Enbrel well without any concerns for side effects or infections  For the osteopenia history she is on daily calcium and vitamin-D supplements   She denies a history of fragility fractures  The following portions of the patient's history were reviewed and updated as appropriate: allergies, current medications, past family history, past medical history, past social history, past surgical history and problem list       Review of Systems  Constitutional: Negative for weight change, fevers, chills, night sweats, fatigue  ENT/Mouth: Negative for hearing changes, ear pain, nasal congestion, sinus pain, hoarseness, sore throat, rhinorrhea, swallowing difficulty  Eyes: Negative for pain, redness, discharge, vision changes  Cardiovascular: Negative for chest pain, SOB, palpitations  Respiratory: Negative for cough, sputum, wheezing, dyspnea  Gastrointestinal: Negative for nausea, vomiting, diarrhea, constipation, pain, heartburn  Genitourinary: Negative for dysuria, urinary frequency, hematuria  Musculoskeletal: As per HPI  Skin: Negative for skin rash, color changes  Neuro: Negative for weakness, numbness, tingling, loss of consciousness  Psych: Negative for anxiety, depression  Heme/Lymph: Negative for easy bruising, bleeding, lymphadenopathy          Past Medical History:   Diagnosis Date    Allergic rhinitis     Anxiety     Colon polyp     Depression     Epigastric pain     from hiatal hernia    Hiatal hernia     Insomnia     Iron deficiency anemia     Lumbar spondylosis 9/25/2020    Osteoporosis     [facility]  Rheumatoid arthritis ([facility] [address] )        Past Surgical History:   Procedure Laterality Date    ABDOMINAL SURGERY      "tummy tuck"    AUGMENTATION MAMMAPLASTY Bilateral 2010    BREAST BIOPSY Right 2008    benign    COLONOSCOPY      KNEE ARTHROSCOPY Bilateral     LASIK      SINUS SURGERY         Social History     Socioeconomic History    Marital status: /Civil Union     Spouse name: Not on file    Number of children: Not on file    Years of education: Not on file    Highest education level: Not on file   Occupational History    Not on file   Tobacco Use    Smoking status: Never Smoker    Smokeless tobacco: Never Used   Vaping Use    Vaping Use: Never used   Substance and Sexual Activity    Alcohol use: Yes     Alcohol/week: 2 0 standard drinks     Types: 2 Glasses of wine per week    Drug use: Not Currently    Sexual activity: Not on file   Other Topics Concern    Not on file   Social History Narrative    Not on file     Social Determinants of Health     Financial Resource Strain:     Difficulty of Paying Living Expenses:    Food Insecurity:     Worried About Running Out of Food in the Last Year:     [address] in the Last Year:    Transportation Needs:     Lack of Transportation (Medical):      Lack of Transportation (Non-Medical):    Physical Activity:     Days of Exercise per Week:     Minutes of Exercise per Session:    Stress:     Feeling of Stress :    Social Connections:     Frequency of Communication with Friends and Family:     Frequency of Social Gatherings with Friends and Family:     Attends [employer] Services:     Active Member of Clubs or Organizations:     Attends Club or Organization Meetings:     Marital Status:    Intimate Partner Violence:     Fear of Current or Ex-Partner:     Emotionally Abused:     Physically Abused:     Sexually Abused:        Family History   Problem Relation Age of Onset    Hypertension Mother     Cancer Mother         breast  Stroke Mother     Crohn's disease Mother     Hyperlipidemia Mother     Heart attack Mother     Breast cancer Mother 79    Heart disease Mother         MI    Hypertension Father     Stroke Father     Hyperlipidemia Father     Heart attack Father         ?     Other Sister         breast watch    Hyperlipidemia Brother        Allergies   Allergen Reactions    Plaquenil [Hydroxychloroquine] Rash       Current Outpatient Medications:     Calcium Carbonate-Vit D-Min (CALCIUM 1200 PO), Take by mouth every morning, Disp: , Rfl:     Enbrel SureClick 50 MG/ML injection, INJECT 1 PEN UNDER THE SKIN EVERY 7 DAYS , Disp: 4 mL, Rfl: 5    Ferrous Sulfate (IRON PO), Take by mouth every morning Last dose 3/29/21, Disp: , Rfl:     folic acid (FOLVITE) 1 mg tablet, Take 1 mg by mouth daily , Disp: , Rfl:     gabapentin (NEURONTIN) 300 mg capsule, Take 2 capsules (600 mg total) by mouth 3 (three) times a day, Disp: 180 capsule, Rfl: 1    Insulin Pen Needle (NovoFine) 32G X 6 MM MISC, Use daily, Disp: 90 each, Rfl: 3    levocetirizine (XYZAL) 5 MG tablet, Take 5 mg by mouth daily as needed for allergies, Disp: , Rfl:     liraglutide (Saxenda) injection, TAKE 0 6 MG DAILY AND GO UP 0 6 MG EVERY WEEK UNTIL REACHES 3 MG, Disp: 45 mL, Rfl: 3    multivitamin (THERAGRAN) TABS, Take 1 tablet by mouth daily Last dose 3/29/21, Disp: , Rfl:     omeprazole (PriLOSEC) 40 MG capsule, TAKE 1 CAPSULE BY MOUTH EVERY DAY (Patient not taking: Reported on 7/26/2021), Disp: 90 capsule, Rfl: 1    sertraline (ZOLOFT) 100 mg tablet, Take 1 tablet (100 mg total) by mouth daily, Disp: 90 tablet, Rfl:     Triamcinolone Acetonide (NASACORT ALLERGY 24HR) 55 MCG/ACT AERO, 2 sprays each nostril daily (Patient taking differently: daily at bedtime 2 sprays each nostril daily), Disp: 16 5 g, Rfl: 3    VITAMIN D PO, Take 10,000 Units by mouth every morning, Disp: , Rfl:     zolpidem (AMBIEN) 10 mg tablet, TAKE 1 TABLET BY MOUTH DAILY AT BEDTIME AS NEEDED FOR SLEEP, Disp: 90 tablet, Rfl: 0      Objective:    Vitals:    09/09/21 1453   BP: 113/77   Pulse: 80   Temp: 98 6 °F (37 °C)       Physical Exam  General: Well appearing, well nourished, in no distress  Oriented x 3, normal mood and affect  Ambulating without difficulty  Skin: Good turgor, no rash, unusual bruising or prominent lesions  Hair: Normal texture and distribution  Nails: Normal color, no deformities  HEENT:  Head: Normocephalic, atraumatic  Eyes: Conjunctiva clear, sclera non-icteric, EOM intact  Extremities: No amputations or deformities, cyanosis, edema  Musculoskeletal:  Hands - there is no tenderness or soft tissue swelling noted, but she does have bony enlargement as a result of osteoarthritis noted at her PIP joints  Neurologic: Alert and oriented  No focal neurological deficits appreciated  Psychiatric: Normal mood and affect  FELIPE Fuentes    Rheumatology

## 2021-09-09 ENCOUNTER — OFFICE VISIT (OUTPATIENT)
Dept: RHEUMATOLOGY | Facility: CLINIC | Age: 61
End: 2021-09-09
Payer: COMMERCIAL

## 2021-09-09 VITALS — TEMPERATURE: 98.6 F | DIASTOLIC BLOOD PRESSURE: 77 MMHG | HEART RATE: 80 BPM | SYSTOLIC BLOOD PRESSURE: 113 MMHG

## 2021-09-09 DIAGNOSIS — Z79.899 LONG-TERM USE OF IMMUNOSUPPRESSANT MEDICATION: ICD-10-CM

## 2021-09-09 DIAGNOSIS — M47.816 OSTEOARTHRITIS OF LUMBAR SPINE, UNSPECIFIED SPINAL OSTEOARTHRITIS COMPLICATION STATUS: ICD-10-CM

## 2021-09-09 DIAGNOSIS — M85.80 OSTEOPENIA, UNSPECIFIED LOCATION: ICD-10-CM

## 2021-09-09 DIAGNOSIS — Z79.899 METHOTREXATE, LONG TERM, CURRENT USE: ICD-10-CM

## 2021-09-09 DIAGNOSIS — Z91.89 AT HIGH RISK FOR FRACTURE: ICD-10-CM

## 2021-09-09 DIAGNOSIS — M06.00 SERONEGATIVE RHEUMATOID ARTHRITIS (HCC): Primary | ICD-10-CM

## 2021-09-09 PROCEDURE — 99215 OFFICE O/P EST HI 40 MIN: CPT | Performed by: INTERNAL MEDICINE

## 2021-09-09 PROCEDURE — 1036F TOBACCO NON-USER: CPT | Performed by: INTERNAL MEDICINE

## 2021-09-14 ENCOUNTER — HOSPITAL ENCOUNTER (OUTPATIENT)
Dept: RADIOLOGY | Facility: HOSPITAL | Age: 61
Discharge: HOME | End: 2021-09-14
Payer: COMMERCIAL

## 2021-09-14 VITALS — WEIGHT: 160 LBS | HEIGHT: 65 IN | BODY MASS INDEX: 26.66 KG/M2

## 2021-09-14 DIAGNOSIS — Z12.31 ENCOUNTER FOR SCREENING MAMMOGRAM FOR MALIGNANT NEOPLASM OF BREAST: ICD-10-CM

## 2021-09-14 PROCEDURE — 77063 BREAST TOMOSYNTHESIS BI: CPT

## 2021-09-14 PROCEDURE — 77067 SCR MAMMO BI INCL CAD: CPT

## 2021-09-19 DIAGNOSIS — F51.01 PRIMARY INSOMNIA: ICD-10-CM

## 2021-09-20 RX ORDER — ZOLPIDEM TARTRATE 10 MG/1
TABLET ORAL
Qty: 90 TABLET | Refills: 0 | Status: SHIPPED | OUTPATIENT
Start: 2021-09-20 | End: 2021-12-22

## 2021-09-27 ENCOUNTER — CLINICAL SUPPORT (OUTPATIENT)
Dept: FAMILY MEDICINE CLINIC | Facility: CLINIC | Age: 61
End: 2021-09-27
Payer: COMMERCIAL

## 2021-09-27 DIAGNOSIS — Z23 NEED FOR ZOSTER VACCINATION: ICD-10-CM

## 2021-09-27 DIAGNOSIS — Z23 NEED FOR INFLUENZA VACCINATION: Primary | ICD-10-CM

## 2021-09-27 PROCEDURE — 90682 RIV4 VACC RECOMBINANT DNA IM: CPT

## 2021-09-27 PROCEDURE — 90472 IMMUNIZATION ADMIN EACH ADD: CPT

## 2021-09-27 PROCEDURE — 90750 HZV VACC RECOMBINANT IM: CPT

## 2021-09-27 PROCEDURE — 90471 IMMUNIZATION ADMIN: CPT

## 2021-10-16 DIAGNOSIS — M54.16 LUMBAR RADICULOPATHY: ICD-10-CM

## 2021-10-18 ENCOUNTER — TELEPHONE (OUTPATIENT)
Dept: PAIN MEDICINE | Facility: MEDICAL CENTER | Age: 61
End: 2021-10-18

## 2021-10-18 DIAGNOSIS — M54.16 LUMBAR RADICULOPATHY: ICD-10-CM

## 2021-10-18 RX ORDER — GABAPENTIN 300 MG/1
CAPSULE ORAL
Qty: 180 CAPSULE | Refills: 1 | OUTPATIENT
Start: 2021-10-18

## 2021-10-18 RX ORDER — GABAPENTIN 300 MG/1
600 CAPSULE ORAL 3 TIMES DAILY
Qty: 180 CAPSULE | Refills: 1 | Status: SHIPPED | OUTPATIENT
Start: 2021-10-18 | End: 2022-01-26

## 2021-10-19 LAB
ALBUMIN SERPL-MCNC: 4.2 G/DL (ref 3.8–4.8)
ALBUMIN/GLOB SERPL: 1.8 {RATIO} (ref 1.2–2.2)
ALP SERPL-CCNC: 98 IU/L (ref 44–121)
ALT SERPL-CCNC: 17 IU/L (ref 0–32)
AST SERPL-CCNC: 27 IU/L (ref 0–40)
BASOPHILS # BLD AUTO: 0 X10E3/UL (ref 0–0.2)
BASOPHILS NFR BLD AUTO: 1 %
BILIRUB SERPL-MCNC: 0.3 MG/DL (ref 0–1.2)
BUN SERPL-MCNC: 18 MG/DL (ref 8–27)
BUN/CREAT SERPL: 21 (ref 12–28)
CALCIUM SERPL-MCNC: 9.8 MG/DL (ref 8.7–10.3)
CHLORIDE SERPL-SCNC: 107 MMOL/L (ref 96–106)
CO2 SERPL-SCNC: 21 MMOL/L (ref 20–29)
CREAT SERPL-MCNC: 0.87 MG/DL (ref 0.57–1)
EOSINOPHIL # BLD AUTO: 0.1 X10E3/UL (ref 0–0.4)
EOSINOPHIL NFR BLD AUTO: 1 %
ERYTHROCYTE [DISTWIDTH] IN BLOOD BY AUTOMATED COUNT: 13 % (ref 11.7–15.4)
GLOBULIN SER-MCNC: 2.3 G/DL (ref 1.5–4.5)
GLUCOSE SERPL-MCNC: 102 MG/DL (ref 65–99)
HCT VFR BLD AUTO: 35.1 % (ref 34–46.6)
HGB BLD-MCNC: 11.7 G/DL (ref 11.1–15.9)
IMM GRANULOCYTES # BLD: 0 X10E3/UL (ref 0–0.1)
IMM GRANULOCYTES NFR BLD: 0 %
LYMPHOCYTES # BLD AUTO: 2.1 X10E3/UL (ref 0.7–3.1)
LYMPHOCYTES NFR BLD AUTO: 33 %
MCH RBC QN AUTO: 31.2 PG (ref 26.6–33)
MCHC RBC AUTO-ENTMCNC: 33.3 G/DL (ref 31.5–35.7)
MCV RBC AUTO: 94 FL (ref 79–97)
MONOCYTES # BLD AUTO: 0.4 X10E3/UL (ref 0.1–0.9)
MONOCYTES NFR BLD AUTO: 7 %
NEUTROPHILS # BLD AUTO: 3.6 X10E3/UL (ref 1.4–7)
NEUTROPHILS NFR BLD AUTO: 58 %
PLATELET # BLD AUTO: 286 X10E3/UL (ref 150–450)
POTASSIUM SERPL-SCNC: 3.8 MMOL/L (ref 3.5–5.2)
PROT SERPL-MCNC: 6.5 G/DL (ref 6–8.5)
RBC # BLD AUTO: 3.75 X10E6/UL (ref 3.77–5.28)
SL AMB EGFR AFRICAN AMERICAN: 83 ML/MIN/1.73
SL AMB EGFR NON AFRICAN AMERICAN: 72 ML/MIN/1.73
SODIUM SERPL-SCNC: 142 MMOL/L (ref 134–144)
WBC # BLD AUTO: 6.1 X10E3/UL (ref 3.4–10.8)

## 2021-11-13 ENCOUNTER — IMMUNIZATIONS (OUTPATIENT)
Dept: FAMILY MEDICINE CLINIC | Facility: HOSPITAL | Age: 61
End: 2021-11-13

## 2021-11-13 DIAGNOSIS — Z23 ENCOUNTER FOR IMMUNIZATION: Primary | ICD-10-CM

## 2021-11-13 PROCEDURE — 91300 COVID-19 PFIZER VACC 0.3 ML: CPT | Performed by: NURSE PRACTITIONER

## 2021-11-13 PROCEDURE — 0001A COVID-19 PFIZER VACC 0.3 ML: CPT | Performed by: NURSE PRACTITIONER

## 2021-11-17 DIAGNOSIS — M06.00 SERONEGATIVE RHEUMATOID ARTHRITIS (HCC): ICD-10-CM

## 2021-11-17 DIAGNOSIS — Z79.899 LONG-TERM USE OF IMMUNOSUPPRESSANT MEDICATION: Primary | ICD-10-CM

## 2021-12-22 DIAGNOSIS — F51.01 PRIMARY INSOMNIA: ICD-10-CM

## 2021-12-22 RX ORDER — ZOLPIDEM TARTRATE 10 MG/1
TABLET ORAL
Qty: 90 TABLET | Refills: 0 | Status: SHIPPED | OUTPATIENT
Start: 2021-12-22 | End: 2022-03-22

## 2022-01-06 ENCOUNTER — TELEPHONE (OUTPATIENT)
Dept: OBGYN CLINIC | Facility: HOSPITAL | Age: 62
End: 2022-01-06

## 2022-01-06 DIAGNOSIS — M06.9 RHEUMATOID ARTHRITIS INVOLVING MULTIPLE SITES (HCC): ICD-10-CM

## 2022-01-06 RX ORDER — ETANERCEPT 50 MG/ML
50 SOLUTION SUBCUTANEOUS
Qty: 4 ML | Refills: 3 | Status: SHIPPED | OUTPATIENT
Start: 2022-01-06 | End: 2022-01-11 | Stop reason: SDUPTHER

## 2022-01-06 NOTE — TELEPHONE ENCOUNTER
Just sent in a refill to [facility] specialty, but then saw message states she  Also needs prior auth for this, please do re-auth, thanks

## 2022-01-06 NOTE — TELEPHONE ENCOUNTER
Patient needing prior auth for refill  Enbrel SureClick 50 MG/ML injection [[id number]]     Order Details  Dose, Route, Frequency: As Directed   Dispense Quantity: 4 mL Refills: 2          Sig: INJECT 1 PEN UNDER THE SKIN EVERY 7 DAYS          Start Date: 12/21/21 End Date: 06/19/22   Written Date: 12/21/21 Expiration Date: 12/21/22       Diagnosis Association: Rheumatoid arthritis involving multiple sites [facility])   Original Order:  Enbrel SureClick 50 MG/ML injection [[id number]]     Providers    Authorizing Provider:    Corine Garcia DO   [address] [address]   Phone:  [phone]   Fax:  [phone]   FARIBA #:  [id number]   NPI:  [id number]        Ordering User:  Corine Garcia, [facility], [address] [place] [id number]   Phone:  [phone]  Fax:  [phone]   FARIBA #:  --       Order Reconciliation Actions       Order Reconciliation Actions     Warnings History    No Interaction Warnings Shown

## 2022-01-07 NOTE — TELEPHONE ENCOUNTER
Patient is calling back on the status of the authorization, she is out and is asking how long the Jensen Willis will take until she can get the medication?      # [phone]

## 2022-01-11 RX ORDER — ETANERCEPT 50 MG/ML
50 SOLUTION SUBCUTANEOUS
Qty: 4 ML | Refills: 3 | Status: SHIPPED | OUTPATIENT
Start: 2022-01-11 | End: 2022-05-03

## 2022-01-11 NOTE — TELEPHONE ENCOUNTER
Enbrel has been approved from 1/8/22 through 1/8/2023 please send RX to [id number]   Thank you

## 2022-01-19 DIAGNOSIS — B34.9 VIRAL ILLNESS: Primary | ICD-10-CM

## 2022-01-26 ENCOUNTER — OFFICE VISIT (OUTPATIENT)
Dept: FAMILY MEDICINE CLINIC | Facility: CLINIC | Age: 62
End: 2022-01-26
Payer: COMMERCIAL

## 2022-01-26 VITALS
SYSTOLIC BLOOD PRESSURE: 138 MMHG | HEART RATE: 80 BPM | WEIGHT: 158 LBS | BODY MASS INDEX: 26.33 KG/M2 | RESPIRATION RATE: 16 BRPM | TEMPERATURE: 97.1 F | HEIGHT: 65 IN | OXYGEN SATURATION: 100 % | DIASTOLIC BLOOD PRESSURE: 70 MMHG

## 2022-01-26 DIAGNOSIS — M06.09 RHEUMATOID ARTHRITIS OF MULTIPLE SITES WITH NEGATIVE RHEUMATOID FACTOR (HCC): ICD-10-CM

## 2022-01-26 DIAGNOSIS — F51.01 PRIMARY INSOMNIA: ICD-10-CM

## 2022-01-26 DIAGNOSIS — E66.3 OVERWEIGHT (BMI 25.0-29.9): ICD-10-CM

## 2022-01-26 DIAGNOSIS — F33.42 RECURRENT MAJOR DEPRESSIVE DISORDER, IN FULL REMISSION (HCC): Primary | ICD-10-CM

## 2022-01-26 DIAGNOSIS — K44.9 HIATAL HERNIA: ICD-10-CM

## 2022-01-26 PROCEDURE — 99214 OFFICE O/P EST MOD 30 MIN: CPT | Performed by: FAMILY MEDICINE

## 2022-01-26 PROCEDURE — 3725F SCREEN DEPRESSION PERFORMED: CPT | Performed by: FAMILY MEDICINE

## 2022-01-26 PROCEDURE — 1036F TOBACCO NON-USER: CPT | Performed by: FAMILY MEDICINE

## 2022-01-26 PROCEDURE — 3008F BODY MASS INDEX DOCD: CPT | Performed by: FAMILY MEDICINE

## 2022-01-26 RX ORDER — LIRAGLUTIDE 6 MG/ML
INJECTION, SOLUTION SUBCUTANEOUS
Qty: 45 ML | Refills: 3
Start: 2022-01-26 | End: 2022-04-22 | Stop reason: SDUPTHER

## 2022-01-26 NOTE — PROGRESS NOTES
Assessment/Plan:    Recurrent major depressive disorder, in full remission ([facility] [address] )  Stable on zoloft     Primary insomnia  Doing well on ambien   Continue to monitor for now    Rheumatoid arthritis of multiple sites with negative rheumatoid factor (HCC)  Stable on Enbrel and Methotrexate  Care per dr True Franco    Hiatal hernia  Stable on omeprazole as needed   Avoiding triggers        Diagnoses and all orders for this visit:    Recurrent major depressive disorder, in full remission ([facility] [address] )    Primary insomnia    Rheumatoid arthritis of multiple sites with negative rheumatoid factor (HCC)  -     CBC and differential  -     Comprehensive metabolic panel  -     Lipid Panel with Direct LDL reflex  -     TSH, 3rd generation with Free T4 reflex    Hiatal hernia    Overweight (BMI 25 0-29 9)  -     liraglutide (Saxenda) injection; 3 mg daily  -     Lipid Panel with Direct LDL reflex          Subjective:      Patient ID: Tara Trotter is a 64 y o  female  HPI  Here for follow up  Dad is living with her with for 1 month now   Feeling well overall      The following portions of the patient's history were reviewed and updated as appropriate: allergies, current medications, past family history, past medical history, past social history, past surgical history and problem list     Review of Systems   Constitutional: Negative  HENT: Negative  Eyes: Negative  Respiratory: Negative  Cardiovascular: Negative  Gastrointestinal: Negative  Endocrine: Negative  Genitourinary: Negative  Musculoskeletal: Negative  Neurological: Negative  Hematological: Negative  Negative for adenopathy  Psychiatric/Behavioral: Negative            Objective:      /70 (BP Location: Left arm, Patient Position: Sitting, Cuff Size: Adult)   Pulse 80   Temp (!) 97 1 °F (36 2 °C) (Skin)   Resp 16   Ht 5' 5" (1 651 m)   Wt 71 7 kg (158 lb)   SpO2 100%   BMI 26 29 kg/m²          Physical Exam  Constitutional: Appearance: She is well-developed  HENT:      Head: Normocephalic and atraumatic  Nose: Nose normal       Mouth/Throat:      Mouth: Mucous membranes are moist    Eyes:      Pupils: Pupils are equal, round, and reactive to light  Neck:      Thyroid: No thyromegaly  Cardiovascular:      Rate and Rhythm: Normal rate and regular rhythm  Pulses: Normal pulses  Heart sounds: Normal heart sounds  No murmur heard  Pulmonary:      Effort: Pulmonary effort is normal       Breath sounds: Normal breath sounds  Abdominal:      General: Bowel sounds are normal       Palpations: Abdomen is soft  Musculoskeletal:         General: No deformity  Normal range of motion  Cervical back: Normal range of motion and neck supple  Skin:     General: Skin is warm  Capillary Refill: Capillary refill takes less than 2 seconds  Findings: No erythema or rash  Neurological:      General: No focal deficit present  Mental Status: She is alert and oriented to person, place, and time  Deep Tendon Reflexes: Reflexes are normal and symmetric  Psychiatric:         Behavior: Behavior normal        BMI Counseling: Body mass index is 26 29 kg/m²  The BMI is above normal  Nutrition recommendations include decreasing portion sizes and encouraging healthy choices of fruits and vegetables  Exercise recommendations include moderate physical activity 150 minutes/week  No pharmacotherapy was ordered  Rationale for BMI follow-up plan is due to patient being overweight or obese

## 2022-02-06 DIAGNOSIS — M06.00 SERONEGATIVE RHEUMATOID ARTHRITIS (HCC): ICD-10-CM

## 2022-02-06 RX ORDER — METHOTREXATE 2.5 MG/1
TABLET ORAL
Qty: 60 TABLET | Refills: 1 | Status: SHIPPED | OUTPATIENT
Start: 2022-02-06

## 2022-02-16 DIAGNOSIS — F33.42 RECURRENT MAJOR DEPRESSIVE DISORDER, IN FULL REMISSION (HCC): ICD-10-CM

## 2022-02-16 RX ORDER — SERTRALINE HYDROCHLORIDE 100 MG/1
100 TABLET, FILM COATED ORAL DAILY
Qty: 90 TABLET | Refills: 3 | Status: SHIPPED | OUTPATIENT
Start: 2022-02-16

## 2022-03-20 DIAGNOSIS — F51.01 PRIMARY INSOMNIA: ICD-10-CM

## 2022-03-22 RX ORDER — ZOLPIDEM TARTRATE 10 MG/1
TABLET ORAL
Qty: 90 TABLET | Refills: 0 | Status: SHIPPED | OUTPATIENT
Start: 2022-03-22 | End: 2022-06-16

## 2022-03-30 LAB
ALBUMIN SERPL-MCNC: 4.5 G/DL (ref 3.8–4.8)
ALBUMIN/GLOB SERPL: 1.9 {RATIO} (ref 1.2–2.2)
ALP SERPL-CCNC: 87 IU/L (ref 44–121)
ALT SERPL-CCNC: 18 IU/L (ref 0–32)
AST SERPL-CCNC: 26 IU/L (ref 0–40)
BASOPHILS # BLD AUTO: 0 X10E3/UL (ref 0–0.2)
BASOPHILS NFR BLD AUTO: 0 %
BILIRUB SERPL-MCNC: 0.3 MG/DL (ref 0–1.2)
BUN SERPL-MCNC: 13 MG/DL (ref 8–27)
BUN/CREAT SERPL: 14 (ref 12–28)
CALCIUM SERPL-MCNC: 9.9 MG/DL (ref 8.7–10.3)
CHLORIDE SERPL-SCNC: 104 MMOL/L (ref 96–106)
CHOLEST SERPL-MCNC: 221 MG/DL (ref 100–199)
CO2 SERPL-SCNC: 19 MMOL/L (ref 20–29)
CREAT SERPL-MCNC: 0.92 MG/DL (ref 0.57–1)
EGFR: 71 ML/MIN/1.73
EOSINOPHIL # BLD AUTO: 0 X10E3/UL (ref 0–0.4)
EOSINOPHIL NFR BLD AUTO: 1 %
ERYTHROCYTE [DISTWIDTH] IN BLOOD BY AUTOMATED COUNT: 12.9 % (ref 11.7–15.4)
GLOBULIN SER-MCNC: 2.4 G/DL (ref 1.5–4.5)
GLUCOSE SERPL-MCNC: 83 MG/DL (ref 65–99)
HCT VFR BLD AUTO: 36.6 % (ref 34–46.6)
HDLC SERPL-MCNC: 66 MG/DL
HGB BLD-MCNC: 12.7 G/DL (ref 11.1–15.9)
IMM GRANULOCYTES # BLD: 0 X10E3/UL (ref 0–0.1)
IMM GRANULOCYTES NFR BLD: 0 %
LDLC SERPL CALC-MCNC: 140 MG/DL (ref 0–99)
LDLC/HDLC SERPL: 2.1 RATIO (ref 0–3.2)
LYMPHOCYTES # BLD AUTO: 2.1 X10E3/UL (ref 0.7–3.1)
LYMPHOCYTES NFR BLD AUTO: 35 %
MCH RBC QN AUTO: 31.9 PG (ref 26.6–33)
MCHC RBC AUTO-ENTMCNC: 34.7 G/DL (ref 31.5–35.7)
MCV RBC AUTO: 92 FL (ref 79–97)
MONOCYTES # BLD AUTO: 0.5 X10E3/UL (ref 0.1–0.9)
MONOCYTES NFR BLD AUTO: 8 %
NEUTROPHILS # BLD AUTO: 3.4 X10E3/UL (ref 1.4–7)
NEUTROPHILS NFR BLD AUTO: 56 %
PLATELET # BLD AUTO: 296 X10E3/UL (ref 150–450)
POTASSIUM SERPL-SCNC: 4.3 MMOL/L (ref 3.5–5.2)
PROT SERPL-MCNC: 6.9 G/DL (ref 6–8.5)
RBC # BLD AUTO: 3.98 X10E6/UL (ref 3.77–5.28)
SL AMB VLDL CHOLESTEROL CALC: 15 MG/DL (ref 5–40)
SODIUM SERPL-SCNC: 140 MMOL/L (ref 134–144)
TRIGL SERPL-MCNC: 83 MG/DL (ref 0–149)
TSH SERPL DL<=0.005 MIU/L-ACNC: 3.13 UIU/ML (ref 0.45–4.5)
WBC # BLD AUTO: 6 X10E3/UL (ref 3.4–10.8)

## 2022-04-07 ENCOUNTER — TELEPHONE (OUTPATIENT)
Dept: RHEUMATOLOGY | Facility: CLINIC | Age: 62
End: 2022-04-07

## 2022-04-07 NOTE — TELEPHONE ENCOUNTER
Patient canceled her appt for today and rescheduled to May - please let her know I can do a telephone/virtual appointment with her today as she has canceled a few times now, thanks

## 2022-04-07 NOTE — TELEPHONE ENCOUNTER
Left detailed message offering to do a virtual visit today, I placed time on hold waiting for a call back

## 2022-04-22 DIAGNOSIS — E66.3 OVERWEIGHT (BMI 25.0-29.9): ICD-10-CM

## 2022-04-22 RX ORDER — LIRAGLUTIDE 6 MG/ML
INJECTION, SOLUTION SUBCUTANEOUS
Qty: 45 ML | Refills: 3 | Status: SHIPPED | OUTPATIENT
Start: 2022-04-22

## 2022-05-03 DIAGNOSIS — M06.9 RHEUMATOID ARTHRITIS INVOLVING MULTIPLE SITES (HCC): ICD-10-CM

## 2022-05-03 RX ORDER — ETANERCEPT 50 MG/ML
SOLUTION SUBCUTANEOUS
Qty: 4 ML | Refills: 5 | Status: SHIPPED | OUTPATIENT
Start: 2022-05-03

## 2022-05-11 NOTE — PROGRESS NOTES
Assessment and Plan:   Ms Melody Billy a 64year-old female with history significant for rheumatoid arthritis, osteopenia and lumbar degenerative arthritis, who presents for a follow-up   She is currently on subcutaneous Enbrel 50 mg weekly and methotrexate 4 tablets once weekly        # Seronegative rheumatoid arthritis  - Hermelinda presents today for follow-up of rheumatoid arthritis, and has been well controlled on a regimen of subcutaneous Enbrel 50 mg weekly and oral methotrexate 4 tablets weekly with folic acid 1 mg daily   She will continue with this regimen unchanged   She is due for high risk medication lab monitoring in 3-4 months  - She has been noticing pain at the right [facility] joint which is likely secondary to osteoarthritis  She will use the topical Voltaren gel up to 4 times a day as needed  If her symptoms persist we can consider a referral to physical therapy or perform an intra-articular cortisone injection      # Lumbar DJD  - Continue follow up with Pain Management as needed        # Osteopenia  - We previously reviewed her DEXA scan in February 2020 which is consistent with osteopenia, but shows a slightly increased risk of major osteoporotic fractures based on the FRAX tool   I advised her that as her DEXA scan overall appears to be stable without a history of fragility fractures, we do have the option to continue monitoring for now with a repeat DEXA scan in the next 1-2 years (plan for 2/2022)   On the other hand I also discussed with her the rationale behind initiating medications such as antiresorptives (bisphosphonate therapy)   We briefly discussed alendronate as a first-line option and I reviewed the side effects with her including but not limited to, risk for arthralgias, GI upset, osteonecrosis of the jaw or atypical fractures   She would like to hold off on any specific osteoporosis medications for now and discuss further after the next DEXA scan is done    - I advised her in the interim to continue daily calcium and vitamin-D supplements  Plan:  Diagnoses and all orders for this visit:    Seronegative rheumatoid arthritis ([facility] [address] )    Long-term use of immunosuppressant medication    Methotrexate, long term, current use  -     CBC and differential; Future  -     Comprehensive metabolic panel; Future  -     C-reactive protein; Future  -     Sedimentation rate, automated; Future    Osteoarthritis of lumbar spine, unspecified spinal osteoarthritis complication status    Primary osteoarthritis of first carpometacarpal joint of right hand    Osteopenia, unspecified location  -     DXA bone density spine hip and pelvis; Future    At high risk for fracture  -     DXA bone density spine hip and pelvis; Future      Activities as tolerated  Exercise: try to maintain a low impact exercise regimen as much as possible  Walk for 30 minutes a day for at least 3 days a week  Continue other medications as prescribed by PCP and other specialists  RTC in 6 months          HPI    INITIAL VISIT NOTE:  Ms Sabiha Kim a 44-year-old female with history significant for rheumatoid arthritis, osteopenia and lumbar degenerative arthritis, who presents for further management of rheumatoid arthritis and establishing with [facility]  Bri Burch is currently on subcutaneous Enbrel 50 mg weekly and methotrexate 8 tablets weekly  Bri Burch is referred by Dr Christian Bush     Patient reports she was diagnosed with rheumatoid arthritis at the age of 27 when she initially presented with left knee pain and swelling   She states since then it has gradually progressed to involve multiple joints   She has tried multiple medications including hydroxychloroquine which caused a skin rash, gold injections, sulfasalazine and possibly other injectables although she cannot recall the names  Bri Burch has been well maintained on a combination of subcutaneous Enbrel 50 mg weekly and methotrexate 8 tablets weekly with folic acid 1 mg daily for the past 18-20 years  Colt Desai has not required to change her medications   She states overall her joint pains have been well controlled although on occasion she will notice an achiness sensation involving her hands, wrists and elbows   Intermittently she will notice swelling affecting her hands and feet   She also experiences morning stiffness primarily involving her hands and feet, and states the feet stiffness resolves right away but the hand stiffness persists for less than 30 minutes   She is currently not taking any steroids or over-the-counter pain medications   She refrains from NSAID use due to a history of esophagitis  Colt Desai mentions previously she had been on chronic courses of steroids but has not required this in the past few years   Within the last year she has been dealing with increasing pain affecting her lower back radiating into her bilateral legs associated with a sensation of weakness  Colt Desai was seen by Orthopedics in [place] and apparently had imaging done which showed osteoarthritic changes affecting her lumbar spine   She did complete a course of physical therapy prior to relocating to this area  Colt Desai is not interested in seeing the [facility] at this time  Colt Desai manages her pain with Tylenol as needed      Overall she has been tolerating the Enbrel and methotrexate well without any concerns for side effects   She denies fevers, chills, unintentional weight loss, mouth ulcers, skin rash or GI issues   No other complaints noted at this time         9/9/2019:  Patient presents for a follow-up today  Colt Desai is currently on subcutaneous Enbrel 50 mg weekly and oral methotrexate 8 tablets weekly with folic acid 1 mg daily      We reviewed her labs done following the prior office visit which revealed an unremarkable CBC, CMP, ESR, CRP, chronic hepatitis panel, QuantiFERON TB gold, vitamin-D, rheumatoid factor and anti CCP antibody   X-rays of her bilateral hands and feet did not show any inflammatory changes, but showed very minimal degenerative changes      She reports overall being stable on her current regimen of the Enbrel and methotrexate   She has not had any flare-ups of joint pain or swelling   Only on occasion will she experience mild pain around her hips and knees   She does experience morning stiffness which affects her diffusely, but lasts only a few minutes   She has not had to take any over-the-counter pain medications for her joint pains   She has been tolerating both medications well without any concerns for side effects or recurrent infections   When she does experience an achy sensation in her bilateral legs she does take Tylenol if needed      She is unsure when her last bone density exam was, but thinks it may have been more than 2 years ago  Simon Rodriguez does take calcium and vitamin-D supplements daily for a history of osteopenia      She denies any other complaints at this time         1/10/2020:  Patient presents for follow-up of seronegative rheumatoid arthritis  Simon Rodriguez is currently on oral methotrexate at 7 tablets once weekly with folic acid 1 mg daily, and Enbrel 50 mg once weekly   We reviewed her labs done following the prior office visit which showed an unremarkable CBC and CMP      She reports overall with decreasing her methotrexate from 8-7 tablets weekly there was no flare-up noted in her joint pains   She is currently denying any joint pain or swelling   She experiences 1 minutes of morning stiffness affecting her hands and feet   She has not had to take any over-the-counter pain medications   She has overall been tolerating the methotrexate and Enbrel well without any concerns for side effects or infections      She is yet to schedule the DEXA scan   She does take calcium and vitamin-D supplements daily         5/18/2020:  Patient presents for a follow-up of seronegative rheumatoid arthritis  Simon Rodriguez is currently on oral methotrexate at 6 tablets once weekly with folic acid 1 mg daily, and Enbrel 50 mg once weekly  Coco Rios is due to get her high risk medication lab monitoring done   We reviewed her DEXA scan done in February 2020 which showed osteopenia, but her 10 year risk of major osteoporotic fracture as calculated by the FRAX score was slightly elevated at 23 8%      In terms of the rheumatoid arthritis, she reports that she is overall doing well at this time and denies any significant concerns for joint flare-ups including pain, swelling or stiffness   She is tolerating her regimen well without any concerns for side effects or infections   Over the past 2 months she has noticed left elbow pain which was initially sharp, but now she describes it as a dull constant pain   She states that activities such as lifting heavy items will worsen the pain and she feels slightly decreased strength in that arm   She does take Tylenol as needed which helps      For the osteopenia history, she does take daily calcium supplements but not vitamin-D   She denies a history of fragility fractures         9/21/2020:  Patient presents for a follow-up of seronegative rheumatoid arthritis  Coco Rios is currently on oral methotrexate at 6 tablets once weekly with folic acid 1 mg daily, and Enbrel 50 mg once weekly   I reviewed her labs from July which were normal      She reports in terms of the rheumatoid arthritis she has been doing very well and denies any joint pains or swelling   She does experience morning stiffness of her hands which takes a few minutes to improve   She has been tolerating the methotrexate and Enbrel well without any concerns for side effects or infections      Over the past few months she has been dealing with low back pain with radiation down her right leg for which she contacted me  Coco Rios was referred to Orthopedics and had an MRI of her lumbar spine done which showed multilevel disc herniations and bulges   She is going to be seeing pain management on Friday to discuss interventional procedures      For the osteopenia history she is on daily calcium and vitamin-D supplements   She denies a history of fragility fractures         3/22/2021:  Patient presents for a follow-up of seronegative rheumatoid arthritis   She is currently on oral methotrexate at 5-6 tablets once weekly with folic acid 1 mg daily, and Enbrel 50 mg once weekly   I reviewed her labs from December which were normal      She reports overall from a rheumatoid arthritis perspective she has been doing well   She did have an episode of pain affecting her left elbow which resolved, but it may still be sensitive to light touch   No other flare-ups of joint pains or swelling   She does experience morning stiffness which mostly affects her hands and resolves within a few minutes   She has not been taking any over-the-counter NSAIDs   She has tolerated the methotrexate and Enbrel well without any concerns for side effects or infections      She did follow with Orthopedics for the low back pain with right-sided radicular symptoms for which she was seen by pain management and had a steroid injection done which provided her with good relief   She reports similar symptoms now occurring down her left leg      For the osteopenia history she is on daily calcium and vitamin-D supplements  Ben Mulligan denies a history of fragility fractures         9/9/2021:  Patient presents for a follow-up of seronegative rheumatoid arthritis  Ben Mulligan is currently on oral methotrexate at 5 tablets once weekly with folic acid 1 mg daily, and Enbrel 50 mg once weekly   She has not had recent high risk medication lab monitoring done       She reports overall from a rheumatoid arthritis perspective she has been stable  She did try to wean off the methotrexate and states that when she discontinued it she noticed worsening stiffness and swelling in her hands    In view of this she did restart the methotrexate at 5 tablets once weekly and states that the stiffness has improved as well as the swelling but she is still feeling like her knuckles (PIPs) are big  Otherwise no significant peripheral joint pains or swelling  She does experience morning stiffness in her ankles and feet that takes less than an hour to improve  She is not taking any over-the-counter pain medications  She has tolerated the methotrexate and Enbrel well without any concerns for side effects or infections      For the osteopenia history she is on daily calcium and vitamin-D supplements   She denies a history of fragility fractures  5/12/2022:  Patient presents for a follow-up of seronegative rheumatoid arthritis  Boo Calderon is currently on oral methotrexate at 4 tablets once weekly with folic acid 1 mg daily and Enbrel 50 mg once weekly  I reviewed her CBC and CMP done in March which were unremarkable  She reports overall from a rheumatoid arthritis perspective she has been stable and not had any flare-ups in joint pains, swelling or stiffness  She is still dealing with the low back and left hip region pain but did not respond to injections administered by [facility]  Gabapentin was also ineffective so she discontinued it  She will follow-up with them as needed        She reports over the past month she has noticed intermittent pain at the base of her right thumb  She has been using Voltaren gel twice a day which does help her  For the osteopenia history she is on daily calcium and vitamin-D supplements   She denies a history of fragility fractures  The following portions of the patient's history were reviewed and updated as appropriate: allergies, current medications, past family history, past medical history, past social history, past surgical history and problem list       Review of Systems  Constitutional: Negative for weight change, fevers, chills, night sweats, fatigue    ENT/Mouth: Negative for hearing changes, ear pain, nasal congestion, sinus pain, hoarseness, sore throat, rhinorrhea, swallowing difficulty  Eyes: Negative for pain, redness, discharge, vision changes  Cardiovascular: Negative for chest pain, SOB, palpitations  Respiratory: Negative for cough, sputum, wheezing, dyspnea  Gastrointestinal: Negative for nausea, vomiting, diarrhea, constipation, pain, heartburn  Genitourinary: Negative for dysuria, urinary frequency, hematuria  Musculoskeletal: As per HPI  Skin: Negative for skin rash, color changes  Neuro: Negative for weakness, numbness, tingling, loss of consciousness  Psych: Negative for anxiety, depression  Heme/Lymph: Negative for easy bruising, bleeding, lymphadenopathy  Past Medical History:   Diagnosis Date    Allergic rhinitis     Anxiety     Colon polyp     Depression     Epigastric pain     from hiatal hernia    Hiatal hernia     Insomnia     Iron deficiency anemia     Lumbar spondylosis 9/25/2020    Osteoporosis     [facility]     Rheumatoid arthritis ([facility] )        Past Surgical History:   Procedure Laterality Date    ABDOMINAL SURGERY      "tummy tuck"    AUGMENTATION MAMMAPLASTY Bilateral 2010    BREAST BIOPSY Right 2008    benign    COLONOSCOPY      KNEE ARTHROSCOPY Bilateral     LASIK      SINUS SURGERY         Social History     Socioeconomic History    Marital status: /Civil Union     Spouse name: Not on file    Number of children: Not on file    Years of education: Not on file    Highest education level: Not on file   Occupational History    Not on file   Tobacco Use    Smoking status: Never Smoker    Smokeless tobacco: Never Used   Vaping Use    Vaping Use: Never used   Substance and Sexual Activity    Alcohol use:  Yes     Alcohol/week: 2 0 standard drinks     Types: 2 Glasses of wine per week    Drug use: Not Currently    Sexual activity: Not on file   Other Topics Concern    Not on file   Social History Narrative    Not on file     Social Determinants of Health     Financial Resource Strain: Not on file   Food Insecurity: Not on file   Transportation Needs: Not on file   Physical Activity: Not on file   Stress: Not on file   Social Connections: Not on file   Intimate Partner Violence: Not on file   Housing Stability: Not on file       Family History   Problem Relation Age of Onset    Hypertension Mother     Cancer Mother         breast    Stroke Mother     Crohn's disease Mother     Hyperlipidemia Mother     Heart attack Mother     Breast cancer Mother 79    Heart disease Mother         MI    Hypertension Father     Stroke Father     Hyperlipidemia Father     Heart attack Father         ?     Skin cancer Father     Hyperlipidemia Brother        Allergies   Allergen Reactions    Plaquenil [Hydroxychloroquine] Rash       Current Outpatient Medications:     Calcium Carbonate-Vit D-Min (CALCIUM 1200 PO), Take by mouth every morning, Disp: , Rfl:     Enbrel SureClick 50 MG/ML injection, INJECT 1 PEN UNDER THE SKIN EVERY 7 DAYS , Disp: 4 mL, Rfl: 5    Ferrous Sulfate (IRON PO), Take by mouth every morning Last dose 3/29/21, Disp: , Rfl:     folic acid (FOLVITE) 1 mg tablet, Take 1 mg by mouth daily , Disp: , Rfl:     Insulin Pen Needle (NovoFine) 32G X 6 MM MISC, Use daily, Disp: 90 each, Rfl: 3    levocetirizine (XYZAL) 5 MG tablet, Take 5 mg by mouth daily as needed for allergies, Disp: , Rfl:     liraglutide (Saxenda) injection, 3 mg daily, Disp: 45 mL, Rfl: 3    methotrexate 2 5 MG tablet, TAKE 5 TABLETS WEEKLY, TAKEALL 5 TABLETS ON THE SAME  DAY EVERY WEEK, Disp: 60 tablet, Rfl: 1    multivitamin (THERAGRAN) TABS, Take 1 tablet by mouth daily Last dose 3/29/21, Disp: , Rfl:     omeprazole (PriLOSEC) 40 MG capsule, TAKE 1 CAPSULE BY MOUTH EVERY DAY, Disp: 90 capsule, Rfl: 1    sertraline (ZOLOFT) 100 mg tablet, Take 1 tablet (100 mg total) by mouth daily, Disp: 90 tablet, Rfl: 3    Triamcinolone Acetonide (NASACORT ALLERGY 24HR) 55 MCG/ACT AERO, 2 sprays each nostril daily (Patient taking differently: daily at bedtime 2 sprays each nostril daily), Disp: 16 5 g, Rfl: 3    VITAMIN D PO, Take 10,000 Units by mouth every morning, Disp: , Rfl:     zolpidem (AMBIEN) 10 mg tablet, TAKE 1 TABLET BY MOUTH DAILY AT BEDTIME AS NEEDED FOR SLEEP , Disp: 90 tablet, Rfl: 0      Objective:    Vitals:    05/12/22 1425   BP: 118/77   Pulse: 83   Temp: 98 4 °F (36 9 °C)       Physical Exam  General: Well appearing, well nourished, in no distress  Oriented x 3, normal mood and affect  Ambulating without difficulty  Skin: Good turgor, no rash, unusual bruising or prominent lesions  Hair: Normal texture and distribution  Nails: Normal color, no deformities  HEENT:  Head: Normocephalic, atraumatic  Eyes: Conjunctiva clear, sclera non-icteric, EOM intact  Extremities: No amputations or deformities, cyanosis, edema  Musculoskeletal:  No swelling noted  Neurologic: Alert and oriented  No focal neurological deficits appreciated  Psychiatric: Normal mood and affect  FELIPE Khanna    Rheumatology

## 2022-05-12 ENCOUNTER — OFFICE VISIT (OUTPATIENT)
Dept: RHEUMATOLOGY | Facility: CLINIC | Age: 62
End: 2022-05-12
Payer: COMMERCIAL

## 2022-05-12 VITALS — HEART RATE: 83 BPM | DIASTOLIC BLOOD PRESSURE: 77 MMHG | TEMPERATURE: 98.4 F | SYSTOLIC BLOOD PRESSURE: 118 MMHG

## 2022-05-12 DIAGNOSIS — M47.816 OSTEOARTHRITIS OF LUMBAR SPINE, UNSPECIFIED SPINAL OSTEOARTHRITIS COMPLICATION STATUS: ICD-10-CM

## 2022-05-12 DIAGNOSIS — Z79.899 METHOTREXATE, LONG TERM, CURRENT USE: ICD-10-CM

## 2022-05-12 DIAGNOSIS — M06.00 SERONEGATIVE RHEUMATOID ARTHRITIS (HCC): Primary | ICD-10-CM

## 2022-05-12 DIAGNOSIS — M85.80 OSTEOPENIA, UNSPECIFIED LOCATION: ICD-10-CM

## 2022-05-12 DIAGNOSIS — M18.11 PRIMARY OSTEOARTHRITIS OF FIRST CARPOMETACARPAL JOINT OF RIGHT HAND: ICD-10-CM

## 2022-05-12 DIAGNOSIS — Z91.89 AT HIGH RISK FOR FRACTURE: ICD-10-CM

## 2022-05-12 DIAGNOSIS — Z79.899 LONG-TERM USE OF IMMUNOSUPPRESSANT MEDICATION: ICD-10-CM

## 2022-05-12 PROCEDURE — 99215 OFFICE O/P EST HI 40 MIN: CPT | Performed by: INTERNAL MEDICINE

## 2022-05-12 PROCEDURE — 1036F TOBACCO NON-USER: CPT | Performed by: INTERNAL MEDICINE

## 2022-06-15 DIAGNOSIS — F51.01 PRIMARY INSOMNIA: ICD-10-CM

## 2022-06-16 RX ORDER — ZOLPIDEM TARTRATE 10 MG/1
TABLET ORAL
Qty: 90 TABLET | Refills: 0 | Status: SHIPPED | OUTPATIENT
Start: 2022-06-16

## 2022-07-05 ENCOUNTER — TELEMEDICINE (OUTPATIENT)
Dept: FAMILY MEDICINE CLINIC | Facility: CLINIC | Age: 62
End: 2022-07-05
Payer: COMMERCIAL

## 2022-07-05 VITALS — WEIGHT: 158 LBS | BODY MASS INDEX: 26.33 KG/M2 | HEIGHT: 65 IN

## 2022-07-05 DIAGNOSIS — U07.1 COVID-19: Primary | ICD-10-CM

## 2022-07-05 DIAGNOSIS — F33.42 RECURRENT MAJOR DEPRESSIVE DISORDER, IN FULL REMISSION (HCC): ICD-10-CM

## 2022-07-05 DIAGNOSIS — M06.09 RHEUMATOID ARTHRITIS OF MULTIPLE SITES WITH NEGATIVE RHEUMATOID FACTOR (HCC): ICD-10-CM

## 2022-07-05 DIAGNOSIS — F51.01 PRIMARY INSOMNIA: ICD-10-CM

## 2022-07-05 LAB — SARS-COV-2 AG UPPER RESP QL IA: ABNORMAL

## 2022-07-05 PROCEDURE — 99214 OFFICE O/P EST MOD 30 MIN: CPT | Performed by: FAMILY MEDICINE

## 2022-07-05 PROCEDURE — 87811 SARS-COV-2 COVID19 W/OPTIC: CPT | Performed by: FAMILY MEDICINE

## 2022-07-05 NOTE — PROGRESS NOTES
Virtual Regular Visit    Verification of patient location:    Patient is located in the following state in which I hold an active license { amb virtual patient location:[address]}      Assessment/Plan:    Problem List Items Addressed This Visit    None              Reason for visit is   Chief Complaint   Patient presents with    COVID-19     Pt  Checked in for Virtual Covid Exam     Virtual Regular Visit        Encounter provider Natalie Gomez MD    Provider located at [address] [address]      Recent Visits  No visits were found meeting these conditions  Showing recent visits within past 7 days and meeting all other requirements  Today's Visits  Date Type Provider Dept   07/05/22 Telemedicine Junnie Landry, MD [facility]   Showing today's visits and meeting all other requirements  Future Appointments  No visits were found meeting these conditions  Showing future appointments within next 150 days and meeting all other requirements       The patient was identified by name and date of birth  Hermelinda Song was informed that this is a telemedicine visit and that the visit is being conducted through {AMB VIRTUAL VISIT XECHAB:89488}  {Telemedicine confidentiality :76726} {Telemedicine participants:76956}  She acknowledged consent and understanding of privacy and security of the video platform  The patient has agreed to participate and understands they can discontinue the visit at any time  Patient is aware this is a billable service  Subjective  Hermelinda Song is a 58 y o  female ***         HPI     Past Medical History:   Diagnosis Date    Allergic rhinitis     Anxiety     Colon polyp     Depression     Epigastric pain     from hiatal hernia    Hiatal hernia     Insomnia     Iron deficiency anemia     Lumbar spondylosis 9/25/2020    Osteoporosis     [facility]     Rheumatoid arthritis [facility])        Past Surgical History:   Procedure Laterality Date    ABDOMINAL SURGERY      "tummy tuck"    AUGMENTATION MAMMAPLASTY Bilateral 2010    BREAST BIOPSY Right 2008    benign    COLONOSCOPY      KNEE ARTHROSCOPY Bilateral     LASIK      SINUS SURGERY         Current Outpatient Medications   Medication Sig Dispense Refill    Calcium Carbonate-Vit D-Min (CALCIUM 1200 PO) Take by mouth every morning      Enbrel SureClick 50 MG/ML injection INJECT 1 PEN UNDER THE SKIN EVERY 7 DAYS  4 mL 5    Ferrous Sulfate (IRON PO) Take by mouth every morning Last dose 7/70/65      folic acid (FOLVITE) 1 mg tablet Take 1 mg by mouth daily       Insulin Pen Needle (NovoFine) 32G X 6 MM MISC Use daily 90 each 3    levocetirizine (XYZAL) 5 MG tablet Take 5 mg by mouth daily as needed for allergies      liraglutide (Saxenda) injection 3 mg daily 45 mL 3    methotrexate 2 5 MG tablet TAKE 5 TABLETS WEEKLY, TAKEALL 5 TABLETS ON THE SAME  DAY EVERY WEEK 60 tablet 1    multivitamin (THERAGRAN) TABS Take 1 tablet by mouth daily Last dose 3/29/21      omeprazole (PriLOSEC) 40 MG capsule TAKE 1 CAPSULE BY MOUTH EVERY DAY 90 capsule 1    sertraline (ZOLOFT) 100 mg tablet Take 1 tablet (100 mg total) by mouth daily 90 tablet 3    Triamcinolone Acetonide (NASACORT ALLERGY 24HR) 55 MCG/ACT AERO 2 sprays each nostril daily (Patient taking differently: daily at bedtime 2 sprays each nostril daily) 16 5 g 3    VITAMIN D PO Take 10,000 Units by mouth every morning      zolpidem (AMBIEN) 10 mg tablet TAKE 1 TABLET BY MOUTH DAILY AT BEDTIME AS NEEDED FOR SLEEP  90 tablet 0     No current facility-administered medications for this visit          Allergies   Allergen Reactions    Plaquenil [Hydroxychloroquine] Rash       Review of Systems    Video Exam    Vitals:    07/05/22 1140   Weight: 71 7 kg (158 lb)   Height: 5' 5" (1 651 m)       Physical Exam     {Time Spent:83836}    VIRTUAL VISIT DISCLAIMER      Hermelinda Duncan verbally agrees to participate in [employer]  Pt is aware that [employer] could be limited without vital signs or the ability to perform a full hands-on physical exam  Hermelinda Song understands she or the provider may request at any time to terminate the video visit and request the patient to seek care or treatment in person

## 2022-07-05 NOTE — PROGRESS NOTES
COVID-19 Outpatient Progress Note    Assessment/Plan:    Problem List Items Addressed This Visit        Musculoskeletal and Integument    Rheumatoid arthritis of multiple sites with negative rheumatoid factor (HCC)     Stable on current meds  Sees rheumatology              Other    Primary insomnia     Ezekiel Rodriguez is helping           Recurrent major depressive disorder, in full remission ([facility] [address] )     Stable on zoloft daily              Other Visit Diagnoses     COVID-19    -  Primary    Relevant Medications    nirmatrelvir & ritonavir (Paxlovid) tablet therapy pack    Other Relevant Orders    [facility] Covid home test flowsheet    Covid at Home Test Kit (Completed)         Disposition:     Patient has COVID-19 infection  Based off CDC guidelines, they were recommended to isolate for 5 days from the date of the positive test  If they remain asymptomatic, isolation may be ended followed by 5 days of wearing a mask when around othes to minimize risk of infecting others  If they have a fever, continue to stay home until fever resolves for at least 24 hours  I recommended continued isolation until at least 24 hours have passed since recovery defined as resolution of fever without the use of fever-reducing medications AND improvement in COVID symptoms AND 10 days have passed since onset of symptoms (or 10 days have passed since date of first positive viral diagnostic test for asymptomatic patients)  Discussed symptom directed medication options with patient  Discussed vitamin D, vitamin C, and/or zinc supplementation with patient  I have spent 15 minutes directly with the patient  Encounter provider Tucker Sauer MD    Provider located at [address] [id number]    Recent Visits  No visits were found meeting these conditions    Showing recent visits within past 7 days and meeting all other requirements  Today's Visits  Date Type Provider Dept   07/05/22 Telemedicine Ellen Seay MD [facility]   Showing today's visits and meeting all other requirements  Future Appointments  No visits were found meeting these conditions  Showing future appointments within next 150 days and meeting all other requirements     This virtual check-in was done via [facility] and patient was informed that this is a secure, HIPAA-compliant platform  She agrees to proceed  Patient agrees to participate in a virtual check in via telephone or video visit instead of presenting to the office to address urgent/immediate medical needs  Patient is aware this is a billable service  After connecting through [facility], the patient was identified by name and date of birth  Hermelinda Song was informed that this was a telemedicine visit and that the exam was being conducted confidentially over secure lines  Hermelinda Song acknowledged consent and understanding of privacy and security of the telemedicine visit  I informed the patient that I have reviewed her record in Epic and presented the opportunity for her to ask any questions regarding the visit today  The patient agreed to participate  Verification of patient location:  Patient is located in the following state in which I hold an active license: [place]    Subjective:   Hermelinda Song is a 58 y o  female who has been screened for COVID-19  Patient's symptoms include fatigue, nasal congestion, rhinorrhea, sore throat, cough and headache  Patient denies fever, chills, malaise, anosmia, loss of taste, shortness of breath, chest tightness, abdominal pain, nausea, vomiting, diarrhea and myalgias  - Date of symptom onset: 7/2/2022      COVID-19 vaccination status: Fully vaccinated with booster    Hermelinda has been staying home and has isolated themselves in her home   She is taking care to not share personal items and is cleaning all surfaces that are touched often, like counters, tabletops, and doorknobs using household cleaning sprays or wipes  She is wearing a mask when she leaves her room  Lab Results   Component Value Date    SARSCOV2 Negative 03/31/2021    [address] Positive (Patient Reported) (A) 07/05/2022     Past Medical History:   Diagnosis Date    Allergic rhinitis     Anxiety     Colon polyp     Depression     Epigastric pain     from hiatal hernia    Hiatal hernia     Insomnia     Iron deficiency anemia     Lumbar spondylosis 9/25/2020    Osteoporosis     RA (rheumatoid arthritis) ([facility] [address] )     Rheumatoid arthritis ([facility] [address] )      Past Surgical History:   Procedure Laterality Date    ABDOMINAL SURGERY      "tummy tuck"    AUGMENTATION MAMMAPLASTY Bilateral 2010    BREAST BIOPSY Right 2008    benign    COLONOSCOPY      KNEE ARTHROSCOPY Bilateral     LASIK      SINUS SURGERY       Current Outpatient Medications   Medication Sig Dispense Refill    nirmatrelvir & ritonavir (Paxlovid) tablet therapy pack Take 3 tablets by mouth 2 (two) times a day for 5 days Take 2 nirmatrelvir tablets + 1 ritonavir tablet together per dose 30 tablet 0    Calcium Carbonate-Vit D-Min (CALCIUM 1200 PO) Take by mouth every morning      Enbrel SureClick 50 MG/ML injection INJECT 1 PEN UNDER THE SKIN EVERY 7 DAYS   4 mL 5    Ferrous Sulfate (IRON PO) Take by mouth every morning Last dose 3/85/53      folic acid (FOLVITE) 1 mg tablet Take 1 mg by mouth daily       Insulin Pen Needle (NovoFine) 32G X 6 MM MISC Use daily 90 each 3    levocetirizine (XYZAL) 5 MG tablet Take 5 mg by mouth daily as needed for allergies      liraglutide (Saxenda) injection 3 mg daily 45 mL 3    methotrexate 2 5 MG tablet TAKE 5 TABLETS WEEKLY, TAKEALL 5 TABLETS ON THE SAME  DAY EVERY WEEK 60 tablet 1    multivitamin (THERAGRAN) TABS Take 1 tablet by mouth daily Last dose 3/29/21      omeprazole (PriLOSEC) 40 MG capsule TAKE 1 CAPSULE BY MOUTH EVERY DAY 90 capsule 1    sertraline (ZOLOFT) 100 mg tablet Take 1 tablet (100 mg total) by mouth daily 90 tablet 3    Triamcinolone Acetonide (NASACORT ALLERGY 24HR) 55 MCG/ACT AERO 2 sprays each nostril daily (Patient taking differently: daily at bedtime 2 sprays each nostril daily) 16 5 g 3    VITAMIN D PO Take 10,000 Units by mouth every morning      zolpidem (AMBIEN) 10 mg tablet TAKE 1 TABLET BY MOUTH DAILY AT BEDTIME AS NEEDED FOR SLEEP  90 tablet 0     No current facility-administered medications for this visit  Allergies   Allergen Reactions    Plaquenil [Hydroxychloroquine] Rash       Review of Systems   Constitutional: Positive for fatigue  Negative for chills and fever  HENT: Positive for congestion, rhinorrhea and sore throat  Respiratory: Positive for cough  Negative for chest tightness and shortness of breath  Gastrointestinal: Negative for abdominal pain, diarrhea, nausea and vomiting  Musculoskeletal: Negative for myalgias  Neurological: Positive for headaches  Objective:    Vitals:    07/05/22 1140   Weight: 71 7 kg (158 lb)   Height: 5' 5" (1 651 m)       Physical Exam  Constitutional:       Appearance: Normal appearance  Pulmonary:      Effort: Pulmonary effort is normal  No respiratory distress  Neurological:      Mental Status: She is alert  Psychiatric:         Mood and Affect: Mood normal          VIRTUAL VISIT DISCLAIMER    Hermelinda Song verbally agrees to participate in [employer]  Pt is aware that [employer] could be limited without vital signs or the ability to perform a full hands-on physical exam  Hermelinda Song understands she or the provider may request at any time to terminate the video visit and request the patient to seek care or treatment in person

## 2022-07-28 ENCOUNTER — HOSPITAL ENCOUNTER (OUTPATIENT)
Dept: RADIOLOGY | Facility: HOSPITAL | Age: 62
Discharge: HOME | End: 2022-07-28
Attending: INTERNAL MEDICINE
Payer: COMMERCIAL

## 2022-07-28 DIAGNOSIS — Z91.89 AT HIGH RISK FOR FRACTURE: ICD-10-CM

## 2022-07-28 DIAGNOSIS — M85.80 OSTEOPENIA, UNSPECIFIED LOCATION: ICD-10-CM

## 2022-07-28 PROCEDURE — 77080 DXA BONE DENSITY AXIAL: CPT

## 2022-07-29 ENCOUNTER — TELEPHONE (OUTPATIENT)
Dept: RHEUMATOLOGY | Facility: CLINIC | Age: 62
End: 2022-07-29

## 2022-07-29 DIAGNOSIS — M81.0 AGE-RELATED OSTEOPOROSIS WITHOUT CURRENT PATHOLOGICAL FRACTURE: Primary | ICD-10-CM

## 2022-07-29 RX ORDER — ALENDRONATE SODIUM 70 MG/1
70 TABLET ORAL
Qty: 12 TABLET | Refills: 3 | Status: SHIPPED | OUTPATIENT
Start: 2022-07-29

## 2022-07-29 NOTE — TELEPHONE ENCOUNTER
----- Message from Violetta Turk MD sent at 7/29/2022  9:19 AM EDT -----  Please let her know the DEXA scan shows a decline in the bone mineral density  Would she be OK starting the osteoporosis medication we discussed previously which is Fosamax that will be one tablet once a week?

## 2022-08-02 NOTE — PROGRESS NOTES
Assessment and Plan:   Ms Yves Zuluaga a 58year-old female with history significant for rheumatoid arthritis, osteoporosis and lumbar degenerative arthritis, who presents for a follow-up   She is currently on subcutaneous Enbrel 50 mg weekly and methotrexate 4 tablets once weekly       # Right CMC joint primary osteoarthritis  - She appears to be symptomatic as a result of CMC osteoarthritis  I offered her an intra-articular cortisone injection which she tolerated well and noticed an immediate improvement in her symptoms      # Seronegative rheumatoid arthritis  - She will continue subcutaneous Enbrel 50 mg weekly and oral methotrexate 4 tablets weekly with folic acid 1 mg daily   She will continue with this regimen unchanged  She will update high risk medication lab monitoring before the follow-up      # Lumbar DJD  - Continue follow up with Pain Management as needed        # Osteopenia  - I reviewed her DEXA scan which showed osteoporosis and in view of this we discussed starting her on bisphosphonate therapy with oral alendronate 70 mg once weekly which was done last week  She will also continue daily calcium and vitamin-D supplements as well as weight-bearing exercise  Small joint arthrocentesis: R thumb CMC  Harrison Protocol:  Consent: Verbal consent obtained  Risks and benefits: risks, benefits and alternatives were discussed  Consent given by: patient  Patient understanding: patient states understanding of the procedure being performed  Patient consent: the patient's understanding of the procedure matches consent given  Procedure consent: procedure consent matches procedure scheduled  Relevant documents: relevant documents present and verified  Test results: test results available and properly labeled  Site marked: the operative site was marked  Radiology Images displayed and confirmed   If images not available, report reviewed: imaging studies available  Patient identity confirmed: verbally with patient    Supporting Documentation  Indications: pain   Procedure Details  Location: thumb - R thumb CMC  Preparation: Patient was prepped and draped in the usual sterile fashion  Needle size: 27 G  Ultrasound guidance: no  Approach: dorsal    Patient tolerance: patient tolerated the procedure well with no immediate complications  Dressing:  Sterile dressing applied      Plan:  Diagnoses and all orders for this visit:    Seronegative rheumatoid arthritis ([address] )    Long-term use of immunosuppressant medication    Methotrexate, long term, current use    Primary osteoarthritis of first carpometacarpal joint of right hand  -     Small joint arthrocentesis  -     triamcinolone acetonide (KENALOG-40) 40 mg/mL injection 40 mg  -     bupivacaine (PF) (MARCAINE) 0 25 % injection 0 5 mL  -     Small joint arthrocentesis: R thumb CMC    Osteoarthritis of lumbar spine, unspecified spinal osteoarthritis complication status    Age-related osteoporosis without current pathological fracture    Long term use of bisphosphonates      Activities as tolerated  Exercise: try to maintain a low impact exercise regimen as much as possible  Walk for 30 minutes a day for at least 3 days a week  Continue other medications as prescribed by PCP and other specialists  RTC in November HPI    INITIAL VISIT NOTE:  Ms Merino Asper a 59-year-old female with history significant for rheumatoid arthritis, osteopenia and lumbar degenerative arthritis, who presents for further management of rheumatoid arthritis and establishing with [facility]  Harrisonmohsen Contreras is currently on subcutaneous Enbrel 50 mg weekly and methotrexate 8 tablets weekly  Hunter Contreras is referred by Dr Lu Andrew     Patient reports she was diagnosed with rheumatoid arthritis at the age of 27 when she initially presented with left knee pain and swelling   She states since then it has gradually progressed to involve multiple joints   She has tried multiple medications including hydroxychloroquine which caused a skin rash, gold injections, sulfasalazine and possibly other injectables although she cannot recall the names  Latoya James has been well maintained on a combination of subcutaneous Enbrel 50 mg weekly and methotrexate 8 tablets weekly with folic acid 1 mg daily for the past 18-20 years  Latoya James has not required to change her medications   She states overall her joint pains have been well controlled although on occasion she will notice an achiness sensation involving her hands, wrists and elbows   Intermittently she will notice swelling affecting her hands and feet   She also experiences morning stiffness primarily involving her hands and feet, and states the feet stiffness resolves right away but the hand stiffness persists for less than 30 minutes   She is currently not taking any steroids or over-the-counter pain medications   She refrains from NSAID use due to a history of esophagitis  Latoya James mentions previously she had been on chronic courses of steroids but has not required this in the past few years   Within the last year she has been dealing with increasing pain affecting her lower back radiating into her bilateral legs associated with a sensation of weakness  Latoya James was seen by Orthopedics in [place] and apparently had imaging done which showed osteoarthritic changes affecting her lumbar spine   She did complete a course of physical therapy prior to relocating to this area  Latoya James is not interested in seeing the [facility] at this time  Latoya James manages her pain with Tylenol as needed      Overall she has been tolerating the Enbrel and methotrexate well without any concerns for side effects   She denies fevers, chills, unintentional weight loss, mouth ulcers, skin rash or GI issues   No other complaints noted at this time         9/9/2019:  Patient presents for a follow-up today   She is currently on subcutaneous Enbrel 50 mg weekly and oral methotrexate 8 tablets weekly with folic acid 1 mg daily      We reviewed her labs done following the prior office visit which revealed an unremarkable CBC, CMP, ESR, CRP, chronic hepatitis panel, QuantiFERON TB gold, vitamin-D, rheumatoid factor and anti CCP antibody   X-rays of her bilateral hands and feet did not show any inflammatory changes, but showed very minimal degenerative changes      She reports overall being stable on her current regimen of the Enbrel and methotrexate   She has not had any flare-ups of joint pain or swelling   Only on occasion will she experience mild pain around her hips and knees   She does experience morning stiffness which affects her diffusely, but lasts only a few minutes   She has not had to take any over-the-counter pain medications for her joint pains   She has been tolerating both medications well without any concerns for side effects or recurrent infections   When she does experience an achy sensation in her bilateral legs she does take Tylenol if needed      She is unsure when her last bone density exam was, but thinks it may have been more than 2 years ago  Norris Kasper does take calcium and vitamin-D supplements daily for a history of osteopenia      She denies any other complaints at this time         1/10/2020:  Patient presents for follow-up of seronegative rheumatoid arthritis  Norris Kasper is currently on oral methotrexate at 7 tablets once weekly with folic acid 1 mg daily, and Enbrel 50 mg once weekly   We reviewed her labs done following the prior office visit which showed an unremarkable CBC and CMP      She reports overall with decreasing her methotrexate from 8-7 tablets weekly there was no flare-up noted in her joint pains   She is currently denying any joint pain or swelling   She experiences 1 minutes of morning stiffness affecting her hands and feet   She has not had to take any over-the-counter pain medications   She has overall been tolerating the methotrexate and Enbrel well without any concerns for side effects or infections      She is yet to schedule the DEXA scan   She does take calcium and vitamin-D supplements daily         5/18/2020:  Patient presents for a follow-up of seronegative rheumatoid arthritis  Maykel Fernandez is currently on oral methotrexate at 6 tablets once weekly with folic acid 1 mg daily, and Enbrel 50 mg once weekly  Maykel Fernandez is due to get her high risk medication lab monitoring done   We reviewed her DEXA scan done in February 2020 which showed osteopenia, but her 10 year risk of major osteoporotic fracture as calculated by the FRAX score was slightly elevated at 23 8%      In terms of the rheumatoid arthritis, she reports that she is overall doing well at this time and denies any significant concerns for joint flare-ups including pain, swelling or stiffness   She is tolerating her regimen well without any concerns for side effects or infections   Over the past 2 months she has noticed left elbow pain which was initially sharp, but now she describes it as a dull constant pain   She states that activities such as lifting heavy items will worsen the pain and she feels slightly decreased strength in that arm   She does take Tylenol as needed which helps      For the osteopenia history, she does take daily calcium supplements but not vitamin-D   She denies a history of fragility fractures         9/21/2020:  Patient presents for a follow-up of seronegative rheumatoid arthritis  Maykel Fernandez is currently on oral methotrexate at 6 tablets once weekly with folic acid 1 mg daily, and Enbrel 50 mg once weekly   I reviewed her labs from July which were normal      She reports in terms of the rheumatoid arthritis she has been doing very well and denies any joint pains or swelling   She does experience morning stiffness of her hands which takes a few minutes to improve   She has been tolerating the methotrexate and Enbrel well without any concerns for side effects or infections      Over the past few months she has been dealing with low back pain with radiation down her right leg for which she contacted me  Karina Ge was referred to Orthopedics and had an MRI of her lumbar spine done which showed multilevel disc herniations and bulges   She is going to be seeing pain management on Friday to discuss interventional procedures      For the osteopenia history she is on daily calcium and vitamin-D supplements  Karina Ge denies a history of fragility fractures         3/22/2021:  Patient presents for a follow-up of seronegative rheumatoid arthritis  Karina Ge is currently on oral methotrexate at 5-6 tablets once weekly with folic acid 1 mg daily, and Enbrel 50 mg once weekly   I reviewed her labs from December which were normal      She reports overall from a rheumatoid arthritis perspective she has been doing well  Karina Ge did have an episode of pain affecting her left elbow which resolved, but it may still be sensitive to light touch   No other flare-ups of joint pains or swelling   She does experience morning stiffness which mostly affects her hands and resolves within a few minutes   She has not been taking any over-the-counter NSAIDs   She has tolerated the methotrexate and Enbrel well without any concerns for side effects or infections      She did follow with Orthopedics for the low back pain with right-sided radicular symptoms for which she was seen by pain management and had a steroid injection done which provided her with good relief   She reports similar symptoms now occurring down her left leg      For the osteopenia history she is on daily calcium and vitamin-D supplements  Karina Ge denies a history of fragility fractures         9/9/2021:  Patient presents for a follow-up of seronegative rheumatoid arthritis  Karina Ge is currently on oral methotrexate at 5 tablets once weekly with folic acid 1 mg daily, and Enbrel 50 mg once weekly   She has not had recent high risk medication lab monitoring done       She reports overall from a rheumatoid arthritis perspective she has been stable  She did try to wean off the methotrexate and states that when she discontinued it she noticed worsening stiffness and swelling in her hands  In view of this she did restart the methotrexate at 5 tablets once weekly and states that the stiffness has improved as well as the swelling but she is still feeling like her knuckles (PIPs) are big  Otherwise no significant peripheral joint pains or swelling  She does experience morning stiffness in her ankles and feet that takes less than an hour to improve  She is not taking any over-the-counter pain medications  She has tolerated the methotrexate and Enbrel well without any concerns for side effects or infections      For the osteopenia history she is on daily calcium and vitamin-D supplements   She denies a history of fragility fractures  5/12/2022:  Patient presents for a follow-up of seronegative rheumatoid arthritis  Samreen Forbes is currently on oral methotrexate at 4 tablets once weekly with folic acid 1 mg daily and Enbrel 50 mg once weekly  I reviewed her CBC and CMP done in March which were unremarkable  She reports overall from a rheumatoid arthritis perspective she has been stable and not had any flare-ups in joint pains, swelling or stiffness  She is still dealing with the low back and left hip region pain but did not respond to injections administered by [facility]  Gabapentin was also ineffective so she discontinued it  She will follow-up with them as needed        She reports over the past month she has noticed intermittent pain at the base of her right thumb  She has been using Voltaren gel twice a day which does help her  For the osteopenia history she is on daily calcium and vitamin-D supplements   She denies a history of fragility fractures  8/3/2022:  Patient presents for an acute visit today due to right base of thumb pain she has been experiencing    She would like an injection  We did also review her recent DEXA scan which shows osteoporosis based on the lumbar spine with a 3% decrease in the bone mineral density of the lumbar spine and a 4% decrease in the total bone mineral density of the bilateral hips  Her 10 year risk of hip fracture is 3% with a 10 year risk of major osteoporotic fracture of 25%  In view of this we discussed starting her on weekly alendronate and she did so last week  The following portions of the patient's history were reviewed and updated as appropriate: allergies, current medications, past family history, past medical history, past social history, past surgical history and problem list       Review of Systems  Constitutional: Negative for weight change, fevers, chills, night sweats, fatigue  ENT/Mouth: Negative for hearing changes, ear pain, nasal congestion, sinus pain, hoarseness, sore throat, rhinorrhea, swallowing difficulty  Eyes: Negative for pain, redness, discharge, vision changes  Cardiovascular: Negative for chest pain, SOB, palpitations  Respiratory: Negative for cough, sputum, wheezing, dyspnea  Gastrointestinal: Negative for nausea, vomiting, diarrhea, constipation, pain, heartburn  Genitourinary: Negative for dysuria, urinary frequency, hematuria  Musculoskeletal: As per HPI  Skin: Negative for skin rash, color changes  Neuro: Negative for weakness, numbness, tingling, loss of consciousness  Psych: Negative for anxiety, depression  Heme/Lymph: Negative for easy bruising, bleeding, lymphadenopathy          Past Medical History:   Diagnosis Date    Allergic rhinitis     Anxiety     Colon polyp     Depression     Epigastric pain     from hiatal hernia    Hiatal hernia     Insomnia     Iron deficiency anemia     Lumbar spondylosis 9/25/2020    Osteoporosis     [facility]     [facility] )        Past Surgical History:   Procedure Laterality Date    ABDOMINAL SURGERY "tummy tuck"    AUGMENTATION MAMMAPLASTY Bilateral 2010    BREAST BIOPSY Right 2008    benign    COLONOSCOPY      KNEE ARTHROSCOPY Bilateral     LASIK      SINUS SURGERY         Social History     Socioeconomic History    Marital status: /Civil Union     Spouse name: Not on file    Number of children: Not on file    Years of education: Not on file    Highest education level: Not on file   Occupational History    Not on file   Tobacco Use    Smoking status: Never Smoker    Smokeless tobacco: Never Used   Vaping Use    Vaping Use: Never used   Substance and Sexual Activity    Alcohol use: Yes     Alcohol/week: 2 0 standard drinks     Types: 2 Glasses of wine per week    Drug use: Not Currently    Sexual activity: Not on file   Other Topics Concern    Not on file   Social History Narrative    Not on file     Social Determinants of Health     Financial Resource Strain: Not on file   Food Insecurity: Not on file   Transportation Needs: Not on file   Physical Activity: Not on file   Stress: Not on file   Social Connections: Not on file   Intimate Partner Violence: Not on file   Housing Stability: Not on file       Family History   Problem Relation Age of Onset    Hypertension Mother     Cancer Mother         breast    Stroke Mother     Crohn's disease Mother     Hyperlipidemia Mother     Heart attack Mother     Breast cancer Mother 79    Heart disease Mother         MI    Hypertension Father     Stroke Father     Hyperlipidemia Father     Heart attack Father         ?     Skin cancer Father     Hyperlipidemia Brother        Allergies   Allergen Reactions    Plaquenil [Hydroxychloroquine] Rash       Current Outpatient Medications:     alendronate (FOSAMAX) 70 mg tablet, Take 1 tablet (70 mg total) by mouth every 7 days, Disp: 12 tablet, Rfl: 3    Calcium Carbonate-Vit D-Min (CALCIUM 1200 PO), Take by mouth every morning, Disp: , Rfl:     Enbrel SureClick 50 MG/ML injection, INJECT 1 PEN UNDER THE SKIN EVERY 7 DAYS , Disp: 4 mL, Rfl: 5    Ferrous Sulfate (IRON PO), Take by mouth every morning Last dose 3/29/21, Disp: , Rfl:     folic acid (FOLVITE) 1 mg tablet, Take 1 mg by mouth daily , Disp: , Rfl:     Insulin Pen Needle (NovoFine) 32G X 6 MM MISC, Use daily, Disp: 90 each, Rfl: 3    levocetirizine (XYZAL) 5 MG tablet, Take 5 mg by mouth daily as needed for allergies, Disp: , Rfl:     liraglutide (Saxenda) injection, 3 mg daily, Disp: 45 mL, Rfl: 3    methotrexate 2 5 MG tablet, TAKE 5 TABLETS WEEKLY, TAKEALL 5 TABLETS ON THE SAME  DAY EVERY WEEK, Disp: 60 tablet, Rfl: 1    multivitamin (THERAGRAN) TABS, Take 1 tablet by mouth daily Last dose 3/29/21, Disp: , Rfl:     omeprazole (PriLOSEC) 40 MG capsule, TAKE 1 CAPSULE BY MOUTH EVERY DAY, Disp: 90 capsule, Rfl: 1    sertraline (ZOLOFT) 100 mg tablet, Take 1 tablet (100 mg total) by mouth daily, Disp: 90 tablet, Rfl: 3    Triamcinolone Acetonide (NASACORT ALLERGY 24HR) 55 MCG/ACT AERO, 2 sprays each nostril daily (Patient taking differently: daily at bedtime 2 sprays each nostril daily), Disp: 16 5 g, Rfl: 3    VITAMIN D PO, Take 10,000 Units by mouth every morning, Disp: , Rfl:     zolpidem (AMBIEN) 10 mg tablet, TAKE 1 TABLET BY MOUTH DAILY AT BEDTIME AS NEEDED FOR SLEEP , Disp: 90 tablet, Rfl: 0  No current facility-administered medications for this visit  Objective:    Vitals:    08/03/22 1057   BP: 124/82   Pulse: 86       Physical Exam  General: Well appearing, well nourished, in no distress  Oriented x 3, normal mood and affect  Ambulating without difficulty  Skin: Good turgor, no rash, unusual bruising or prominent lesions  Hair: Normal texture and distribution  Nails: Normal color, no deformities  HEENT:  Head: Normocephalic, atraumatic  Eyes: Conjunctiva clear, sclera non-icteric, EOM intact  Extremities: No amputations or deformities, cyanosis, edema    Musculoskeletal:    Right CMC squaring noted without significant tenderness, soft tissue swelling and with negative grind test   Neurologic: Alert and oriented  No focal neurological deficits appreciated  Psychiatric: Normal mood and affect  FELIPE Botello    Rheumatology

## 2022-08-03 ENCOUNTER — OFFICE VISIT (OUTPATIENT)
Dept: RHEUMATOLOGY | Facility: CLINIC | Age: 62
End: 2022-08-03
Payer: COMMERCIAL

## 2022-08-03 VITALS — SYSTOLIC BLOOD PRESSURE: 124 MMHG | DIASTOLIC BLOOD PRESSURE: 82 MMHG | HEART RATE: 86 BPM

## 2022-08-03 DIAGNOSIS — Z79.83 LONG TERM USE OF BISPHOSPHONATES: ICD-10-CM

## 2022-08-03 DIAGNOSIS — M06.00 SERONEGATIVE RHEUMATOID ARTHRITIS (HCC): Primary | ICD-10-CM

## 2022-08-03 DIAGNOSIS — M81.0 AGE-RELATED OSTEOPOROSIS WITHOUT CURRENT PATHOLOGICAL FRACTURE: ICD-10-CM

## 2022-08-03 DIAGNOSIS — Z79.631 METHOTREXATE, LONG TERM, CURRENT USE: ICD-10-CM

## 2022-08-03 DIAGNOSIS — M18.11 PRIMARY OSTEOARTHRITIS OF FIRST CARPOMETACARPAL JOINT OF RIGHT HAND: ICD-10-CM

## 2022-08-03 DIAGNOSIS — M47.816 OSTEOARTHRITIS OF LUMBAR SPINE, UNSPECIFIED SPINAL OSTEOARTHRITIS COMPLICATION STATUS: ICD-10-CM

## 2022-08-03 DIAGNOSIS — Z79.60 LONG-TERM USE OF IMMUNOSUPPRESSANT MEDICATION: ICD-10-CM

## 2022-08-03 PROCEDURE — 99214 OFFICE O/P EST MOD 30 MIN: CPT | Performed by: INTERNAL MEDICINE

## 2022-08-03 PROCEDURE — 20600 DRAIN/INJ JOINT/BURSA W/O US: CPT | Performed by: INTERNAL MEDICINE

## 2022-08-03 RX ORDER — TRIAMCINOLONE ACETONIDE 40 MG/ML
40 INJECTION, SUSPENSION INTRA-ARTICULAR; INTRAMUSCULAR ONCE
Status: COMPLETED | OUTPATIENT
Start: 2022-08-03 | End: 2022-08-03

## 2022-08-03 RX ORDER — BUPIVACAINE HYDROCHLORIDE 2.5 MG/ML
0.5 INJECTION, SOLUTION EPIDURAL; INFILTRATION; INTRACAUDAL ONCE
Status: COMPLETED | OUTPATIENT
Start: 2022-08-03 | End: 2022-08-03

## 2022-08-03 RX ADMIN — BUPIVACAINE HYDROCHLORIDE 0.5 ML: 2.5 INJECTION, SOLUTION EPIDURAL; INFILTRATION; INTRACAUDAL at 11:37

## 2022-08-03 RX ADMIN — TRIAMCINOLONE ACETONIDE 40 MG: 40 INJECTION, SUSPENSION INTRA-ARTICULAR; INTRAMUSCULAR at 11:37

## 2022-09-13 ENCOUNTER — OFFICE VISIT (OUTPATIENT)
Dept: FAMILY MEDICINE CLINIC | Facility: CLINIC | Age: 62
End: 2022-09-13
Payer: COMMERCIAL

## 2022-09-13 VITALS
HEIGHT: 65 IN | HEART RATE: 72 BPM | SYSTOLIC BLOOD PRESSURE: 120 MMHG | TEMPERATURE: 96.6 F | DIASTOLIC BLOOD PRESSURE: 74 MMHG | RESPIRATION RATE: 16 BRPM | BODY MASS INDEX: 23.79 KG/M2 | WEIGHT: 142.8 LBS | OXYGEN SATURATION: 99 %

## 2022-09-13 DIAGNOSIS — F33.42 RECURRENT MAJOR DEPRESSIVE DISORDER, IN FULL REMISSION (HCC): ICD-10-CM

## 2022-09-13 DIAGNOSIS — F51.01 PRIMARY INSOMNIA: ICD-10-CM

## 2022-09-13 DIAGNOSIS — Z23 ENCOUNTER FOR IMMUNIZATION: ICD-10-CM

## 2022-09-13 DIAGNOSIS — M06.09 RHEUMATOID ARTHRITIS OF MULTIPLE SITES WITH NEGATIVE RHEUMATOID FACTOR (HCC): ICD-10-CM

## 2022-09-13 DIAGNOSIS — Z00.00 ANNUAL PHYSICAL EXAM: Primary | ICD-10-CM

## 2022-09-13 PROCEDURE — 90682 RIV4 VACC RECOMBINANT DNA IM: CPT

## 2022-09-13 PROCEDURE — 99396 PREV VISIT EST AGE 40-64: CPT | Performed by: FAMILY MEDICINE

## 2022-09-13 PROCEDURE — 90471 IMMUNIZATION ADMIN: CPT

## 2022-09-13 PROCEDURE — 3725F SCREEN DEPRESSION PERFORMED: CPT | Performed by: FAMILY MEDICINE

## 2022-09-13 NOTE — PATIENT INSTRUCTIONS

## 2022-09-13 NOTE — PROGRESS NOTES
[address]    NAME: Hermelinda Song  AGE: 58 y o  SEX: female  : 1960     DATE: 2022     Assessment and Plan:     Problem List Items Addressed This Visit        Musculoskeletal and Integument    Rheumatoid arthritis of multiple sites with negative rheumatoid factor ([facility] [address] )     Off of oral meds  enbrel injection once a week             Other    Primary insomnia     ambien as directed          Recurrent major depressive disorder, in full remission ([facility] [address] )     Sertraline as directed             Other Visit Diagnoses     Annual physical exam    -  Primary    Encounter for immunization        Relevant Orders    influenza vaccine, quadrivalent, recombinant, PF, 0 5 mL, for patients 18 yr+ (FLUBLOK) (Completed)          Immunizations and preventive care screenings were discussed with patient today  Appropriate education was printed on patient's after visit summary  Counseling:  Alcohol/drug use: discussed moderation in alcohol intake, the recommendations for healthy alcohol use, and avoidance of illicit drug use  Dental Health: discussed importance of regular tooth brushing, flossing, and dental visits  Injury prevention: discussed safety/seat belts, safety helmets, smoke detectors, carbon dioxide detectors, and smoking near bedding or upholstery  Sexual health: discussed sexually transmitted diseases, partner selection, use of condoms, avoidance of unintended pregnancy, and contraceptive alternatives  Exercise: the importance of regular exercise/physical activity was discussed  Recommend exercise 3-5 times per week for at least 30 minutes  No follow-ups on file       Chief Complaint:     Chief Complaint   Patient presents with    Physical Exam     Patient is here today for an annual exam       History of Present Illness:     Adult Annual Physical   Patient here for a comprehensive physical exam  The patient reports no problems  Diet and Physical Activity  Diet/Nutrition: consuming 3-5 servings of fruits/vegetables daily  Exercise: moderate cardiovascular exercise  Depression Screening  PHQ-2/9 Depression Screening    Little interest or pleasure in doing things: 0 - not at all  Feeling down, depressed, or hopeless: 0 - not at all  Trouble falling or staying asleep, or sleeping too much: 3 - nearly every day  Feeling tired or having little energy: 1 - several days  Poor appetite or overeatin - several days  Feeling bad about yourself - or that you are a failure or have let yourself or your family down: 0 - not at all  Trouble concentrating on things, such as reading the newspaper or watching television: 0 - not at all  Moving or speaking so slowly that other people could have noticed  Or the opposite - being so fidgety or restless that you have been moving around a lot more than usual: 0 - not at all  Thoughts that you would be better off dead, or of hurting yourself in some way: 0 - not at all  PHQ-9 Score: 5   PHQ-9 Interpretation: Mild depression        General Health  Sleep: sleeps well and gets 7-8 hours of sleep on average  Hearing: normal - bilateral   Vision: goes for regular eye exams  Dental: regular dental visits and brushes teeth twice daily  /GYN Health  Patient is: postmenopausal  Last menstrual period: over 20 years ago  Contraceptive method: none  Review of Systems:     Review of Systems   Constitutional: Negative  HENT: Negative  Eyes: Negative  Respiratory: Negative  Cardiovascular: Negative  Gastrointestinal: Negative  Endocrine: Negative  Genitourinary: Negative  Musculoskeletal: Negative  Skin: Negative  Allergic/Immunologic: Negative  Neurological: Negative  Hematological: Negative  Psychiatric/Behavioral: Negative         Past Medical History:     Past Medical History:   Diagnosis Date    Allergic rhinitis     Anxiety     Colon polyp     Depression     Epigastric pain     from hiatal hernia    Hiatal hernia     Insomnia     Iron deficiency anemia     Lumbar spondylosis 9/25/2020    Osteoporosis     RA (rheumatoid arthritis) (HCC)     Rheumatoid arthritis (HCC)       Past Surgical History:     Past Surgical History:   Procedure Laterality Date    ABDOMINAL SURGERY      "tummy tuck"    AUGMENTATION MAMMAPLASTY Bilateral 2010    BREAST BIOPSY Right 2008    benign    COLONOSCOPY      KNEE ARTHROSCOPY Bilateral     LASIK      SINUS SURGERY        Social History:     Social History     Socioeconomic History    Marital status: /Civil Union     Spouse name: None    Number of children: None    Years of education: None    Highest education level: None   Occupational History    None   Tobacco Use    Smoking status: Never Smoker    Smokeless tobacco: Never Used   Vaping Use    Vaping Use: Never used   Substance and Sexual Activity    Alcohol use: Not Currently     Comment: occ    Drug use: Not Currently    Sexual activity: None   Other Topics Concern    None   Social History Narrative    None     Social Determinants of Health     Financial Resource Strain: Not on file   Food Insecurity: Not on file   Transportation Needs: Not on file   Physical Activity: Not on file   Stress: Not on file   Social Connections: Not on file   Intimate Partner Violence: Not on file   Housing Stability: Not on file      Family History:     Family History   Problem Relation Age of Onset    Hypertension Mother     Cancer Mother         breast    Stroke Mother     Crohn's disease Mother     Hyperlipidemia Mother     Heart attack Mother     Breast cancer Mother 79    Heart disease Mother         MI    Hypertension Father     Stroke Father     Hyperlipidemia Father     Heart attack Father         ?     Skin cancer Father     Hyperlipidemia Brother       Current Medications:     Current Outpatient Medications   Medication Sig Dispense Refill    alendronate (FOSAMAX) 70 mg tablet Take 1 tablet (70 mg total) by mouth every 7 days 12 tablet 3    Calcium Carbonate-Vit D-Min (CALCIUM 1200 PO) Take by mouth every morning      Enbrel SureClick 50 MG/ML injection INJECT 1 PEN UNDER THE SKIN EVERY 7 DAYS  4 mL 5    Ferrous Sulfate (IRON PO) Take by mouth every other day Last dose 0/20/98      folic acid (FOLVITE) 1 mg tablet Take 1 mg by mouth daily       Insulin Pen Needle (NovoFine) 32G X 6 MM MISC Use daily 90 each 3    levocetirizine (XYZAL) 5 MG tablet Take 5 mg by mouth daily as needed for allergies      liraglutide (Saxenda) injection 3 mg daily 45 mL 3    methotrexate 2 5 MG tablet TAKE 5 TABLETS WEEKLY, TAKEALL 5 TABLETS ON THE SAME  DAY EVERY WEEK (Patient taking differently: TAKE 1 TABLET WEEKLY,) 60 tablet 1    multivitamin (THERAGRAN) TABS Take 1 tablet by mouth daily Last dose 3/29/21      sertraline (ZOLOFT) 100 mg tablet Take 1 tablet (100 mg total) by mouth daily 90 tablet 3    VITAMIN D PO Take 10,000 Units by mouth every morning      zolpidem (AMBIEN) 10 mg tablet TAKE 1 TABLET BY MOUTH DAILY AT BEDTIME AS NEEDED FOR SLEEP  90 tablet 0    omeprazole (PriLOSEC) 40 MG capsule TAKE 1 CAPSULE BY MOUTH EVERY DAY (Patient not taking: Reported on 9/13/2022) 90 capsule 1    Triamcinolone Acetonide (NASACORT ALLERGY 24HR) 55 MCG/ACT AERO 2 sprays each nostril daily (Patient not taking: Reported on 9/13/2022) 16 5 g 3     No current facility-administered medications for this visit  Allergies: Allergies   Allergen Reactions    Plaquenil [Hydroxychloroquine] Rash      Physical Exam:     /74 (BP Location: Left arm, Patient Position: Sitting, Cuff Size: Adult)   Pulse 72   Temp (!) 96 6 °F (35 9 °C) (Skin)   Resp 16   Ht 5' 4 84" (1 647 m)   Wt 64 8 kg (142 lb 12 8 oz)   SpO2 99%   BMI 23 88 kg/m²     Physical Exam  Vitals and nursing note reviewed  Constitutional:       Appearance: Normal appearance  She is well-developed  HENT:      Head: Normocephalic and atraumatic  Right Ear: Tympanic membrane normal       Left Ear: Tympanic membrane normal       Nose: Nose normal       Mouth/Throat:      Mouth: Mucous membranes are moist    Eyes:      Pupils: Pupils are equal, round, and reactive to light  Cardiovascular:      Rate and Rhythm: Normal rate and regular rhythm  Pulses: Normal pulses  Heart sounds: Normal heart sounds  Pulmonary:      Effort: Pulmonary effort is normal  No respiratory distress  Breath sounds: Normal breath sounds  No wheezing  Abdominal:      General: Bowel sounds are normal       Palpations: Abdomen is soft  Musculoskeletal:         General: No deformity  Normal range of motion  Cervical back: Normal range of motion and neck supple  Skin:     General: Skin is warm  Capillary Refill: Capillary refill takes less than 2 seconds  Neurological:      General: No focal deficit present  Mental Status: She is alert and oriented to person, place, and time     Psychiatric:         Mood and Affect: Mood normal          Behavior: Behavior normal           Elvira Bertrand MD  [address] [facility]

## 2022-09-18 DIAGNOSIS — F51.01 PRIMARY INSOMNIA: ICD-10-CM

## 2022-09-19 RX ORDER — ZOLPIDEM TARTRATE 10 MG/1
TABLET ORAL
Qty: 90 TABLET | Refills: 0 | Status: SHIPPED | OUTPATIENT
Start: 2022-09-19

## 2022-09-20 DIAGNOSIS — Z12.31 VISIT FOR SCREENING MAMMOGRAM: Primary | ICD-10-CM

## 2022-10-12 PROBLEM — Z12.11 COLON CANCER SCREENING: Status: RESOLVED | Noted: 2021-03-18 | Resolved: 2022-10-12

## 2022-10-28 DIAGNOSIS — E66.3 OVERWEIGHT (BMI 25.0-29.9): ICD-10-CM

## 2022-10-28 RX ORDER — PEN NEEDLE, DIABETIC 32 GX 1/4"
NEEDLE, DISPOSABLE MISCELLANEOUS
Qty: 100 EACH | Refills: 3 | Status: SHIPPED | OUTPATIENT
Start: 2022-10-28

## 2022-11-09 ENCOUNTER — HOSPITAL ENCOUNTER (OUTPATIENT)
Dept: RADIOLOGY | Facility: HOSPITAL | Age: 62
Discharge: HOME | End: 2022-11-09

## 2022-11-09 VITALS — HEIGHT: 65 IN | WEIGHT: 142 LBS | BODY MASS INDEX: 23.66 KG/M2

## 2022-11-09 DIAGNOSIS — Z12.31 VISIT FOR SCREENING MAMMOGRAM: ICD-10-CM

## 2022-11-14 ENCOUNTER — OFFICE VISIT (OUTPATIENT)
Dept: RHEUMATOLOGY | Facility: CLINIC | Age: 62
End: 2022-11-14

## 2022-11-14 VITALS
WEIGHT: 142 LBS | SYSTOLIC BLOOD PRESSURE: 108 MMHG | DIASTOLIC BLOOD PRESSURE: 71 MMHG | HEART RATE: 76 BPM | TEMPERATURE: 98.4 F | BODY MASS INDEX: 23.63 KG/M2

## 2022-11-14 DIAGNOSIS — M47.816 OSTEOARTHRITIS OF LUMBAR SPINE, UNSPECIFIED SPINAL OSTEOARTHRITIS COMPLICATION STATUS: ICD-10-CM

## 2022-11-14 DIAGNOSIS — Z79.83 LONG TERM USE OF BISPHOSPHONATES: ICD-10-CM

## 2022-11-14 DIAGNOSIS — Z79.60 LONG-TERM USE OF IMMUNOSUPPRESSANT MEDICATION: ICD-10-CM

## 2022-11-14 DIAGNOSIS — M81.0 AGE-RELATED OSTEOPOROSIS WITHOUT CURRENT PATHOLOGICAL FRACTURE: ICD-10-CM

## 2022-11-14 DIAGNOSIS — M18.11 PRIMARY OSTEOARTHRITIS OF FIRST CARPOMETACARPAL JOINT OF RIGHT HAND: ICD-10-CM

## 2022-11-14 DIAGNOSIS — M06.00 SERONEGATIVE RHEUMATOID ARTHRITIS (HCC): Primary | ICD-10-CM

## 2022-11-14 RX ORDER — ETANERCEPT 50 MG/ML
50 SOLUTION SUBCUTANEOUS WEEKLY
Qty: 4 ML | Refills: 11 | Status: SHIPPED | OUTPATIENT
Start: 2022-11-14 | End: 2022-11-15 | Stop reason: SDUPTHER

## 2022-11-15 DIAGNOSIS — M06.00 SERONEGATIVE RHEUMATOID ARTHRITIS (HCC): ICD-10-CM

## 2022-11-15 RX ORDER — ETANERCEPT 50 MG/ML
SOLUTION SUBCUTANEOUS
Qty: 4 ML | Refills: 11 | Status: SHIPPED | OUTPATIENT
Start: 2022-11-15

## 2022-12-19 DIAGNOSIS — F33.42 RECURRENT MAJOR DEPRESSIVE DISORDER, IN FULL REMISSION (HCC): ICD-10-CM

## 2022-12-19 DIAGNOSIS — F51.01 PRIMARY INSOMNIA: ICD-10-CM

## 2022-12-19 RX ORDER — SERTRALINE HYDROCHLORIDE 100 MG/1
TABLET, FILM COATED ORAL
Qty: 90 TABLET | Refills: 3 | Status: SHIPPED | OUTPATIENT
Start: 2022-12-19

## 2022-12-20 RX ORDER — ZOLPIDEM TARTRATE 10 MG/1
TABLET ORAL
Qty: 90 TABLET | Refills: 0 | Status: SHIPPED | OUTPATIENT
Start: 2022-12-20

## 2023-02-09 ENCOUNTER — TELEPHONE (OUTPATIENT)
Dept: OBGYN CLINIC | Facility: HOSPITAL | Age: 63
End: 2023-02-09

## 2023-02-09 NOTE — TELEPHONE ENCOUNTER
Caller: Rupesh -[facility]     Doctor/Office: Chepe/Blayne    Call regarding :  Prior auth    Call was transferred to: Office- MA

## 2023-02-09 NOTE — TELEPHONE ENCOUNTER
Caller: Patricia    Doctor/Office: licha    CB#: [phone]      What needs to be faxed: Office visit note    ATTN to: [facility]    Fax#: [phone]      Documents were successfully e-faxed

## 2023-03-15 ENCOUNTER — CLINICAL SUPPORT (OUTPATIENT)
Dept: FAMILY MEDICINE CLINIC | Facility: CLINIC | Age: 63
End: 2023-03-15

## 2023-03-15 DIAGNOSIS — Z11.1 ENCOUNTER FOR PPD TEST: Primary | ICD-10-CM

## 2023-03-15 DIAGNOSIS — F51.01 PRIMARY INSOMNIA: ICD-10-CM

## 2023-03-15 NOTE — PROGRESS NOTES
Name: Devang Song      : 1960      MRN: [id number]  Encounter Provider: Nurse Hunter ALVARADO  Encounter Date: 3/15/2023   Encounter department: [place] [address]  Encounter for PPD test  -     TB Skin Test           Subjective      HPI  Review of Systems    Current Outpatient Medications on File Prior to Visit   Medication Sig   • alendronate (FOSAMAX) 70 mg tablet Take 1 tablet (70 mg total) by mouth every 7 days   • BD Pen Needle Micro U/F 32G X 6 MM MISC USE DAILY AS DIRECTED   • Calcium Carbonate-Vit D-Min (CALCIUM 1200 PO) Take by mouth every morning   • etanercept (Enbrel SureClick) 50 MG/ML injection Inject 1 mL (50 mg total) under the skin once a week   • Ferrous Sulfate (IRON PO) Take by mouth every other day Last dose 3/29/21   • levocetirizine (XYZAL) 5 MG tablet Take 5 mg by mouth daily as needed for allergies   • liraglutide (Saxenda) injection 3 mg daily   • multivitamin (THERAGRAN) TABS Take 1 tablet by mouth daily Last dose 3/29/21   • omeprazole (PriLOSEC) 40 MG capsule TAKE 1 CAPSULE BY MOUTH EVERY DAY (Patient not taking: Reported on 2022)   • sertraline (ZOLOFT) 100 mg tablet TAKE 1 TABLET BY MOUTH EVERY DAY   • Triamcinolone Acetonide (NASACORT ALLERGY 24HR) 55 MCG/ACT AERO 2 sprays each nostril daily (Patient not taking: Reported on 2022)   • VITAMIN D PO Take 10,000 Units by mouth every morning   • zolpidem (AMBIEN) 10 mg tablet TAKE 1 TABLET BY MOUTH DAILY AT BEDTIME AS NEEDED FOR SLEEP  Objective     There were no vitals taken for this visit      Nurse Hunter ALVARADO

## 2023-03-16 RX ORDER — ZOLPIDEM TARTRATE 10 MG/1
TABLET ORAL
Qty: 90 TABLET | Refills: 0 | Status: SHIPPED | OUTPATIENT
Start: 2023-03-16

## 2023-03-17 ENCOUNTER — CLINICAL SUPPORT (OUTPATIENT)
Dept: FAMILY MEDICINE CLINIC | Facility: CLINIC | Age: 63
End: 2023-03-17

## 2023-03-17 DIAGNOSIS — Z11.1 ENCOUNTER FOR PPD SKIN TEST READING: Primary | ICD-10-CM

## 2023-03-17 LAB
INDURATION: 0 MM
TB SKIN TEST: NEGATIVE

## 2023-03-17 NOTE — PROGRESS NOTES
Name: Jackey Bosworth CatherKnightly      : 1960      MRN: [id number]  Encounter Provider: Arlen Upton  Encounter Date: 3/17/2023   Encounter department: [place] [address]  Encounter for PPD skin test reading           Subjective          Current Outpatient Medications on File Prior to Visit   Medication Sig   • alendronate (FOSAMAX) 70 mg tablet Take 1 tablet (70 mg total) by mouth every 7 days   • BD Pen Needle Micro U/F 32G X 6 MM MISC USE DAILY AS DIRECTED   • Calcium Carbonate-Vit D-Min (CALCIUM 1200 PO) Take by mouth every morning   • etanercept (Enbrel SureClick) 50 MG/ML injection Inject 1 mL (50 mg total) under the skin once a week   • Ferrous Sulfate (IRON PO) Take by mouth every other day Last dose 3/29/21   • levocetirizine (XYZAL) 5 MG tablet Take 5 mg by mouth daily as needed for allergies   • liraglutide (Saxenda) injection 3 mg daily   • multivitamin (THERAGRAN) TABS Take 1 tablet by mouth daily Last dose 3/29/21   • omeprazole (PriLOSEC) 40 MG capsule TAKE 1 CAPSULE BY MOUTH EVERY DAY (Patient not taking: Reported on 2022)   • sertraline (ZOLOFT) 100 mg tablet TAKE 1 TABLET BY MOUTH EVERY DAY   • Triamcinolone Acetonide (NASACORT ALLERGY 24HR) 55 MCG/ACT AERO 2 sprays each nostril daily (Patient not taking: Reported on 2022)   • VITAMIN D PO Take 10,000 Units by mouth every morning   • zolpidem (AMBIEN) 10 mg tablet TAKE 1 TABLET BY MOUTH DAILY AT BEDTIME AS NEEDED FOR SLEEP         Objective   Arlen Upton

## 2023-04-06 ENCOUNTER — OFFICE VISIT (OUTPATIENT)
Dept: FAMILY MEDICINE CLINIC | Facility: CLINIC | Age: 63
End: 2023-04-06

## 2023-04-06 VITALS
HEART RATE: 76 BPM | OXYGEN SATURATION: 98 % | SYSTOLIC BLOOD PRESSURE: 112 MMHG | TEMPERATURE: 99.5 F | RESPIRATION RATE: 16 BRPM | DIASTOLIC BLOOD PRESSURE: 74 MMHG

## 2023-04-06 DIAGNOSIS — J01.00 ACUTE NON-RECURRENT MAXILLARY SINUSITIS: Primary | ICD-10-CM

## 2023-04-06 DIAGNOSIS — F33.42 RECURRENT MAJOR DEPRESSIVE DISORDER, IN FULL REMISSION (HCC): ICD-10-CM

## 2023-04-06 DIAGNOSIS — M81.0 AGE-RELATED OSTEOPOROSIS WITHOUT CURRENT PATHOLOGICAL FRACTURE: ICD-10-CM

## 2023-04-06 DIAGNOSIS — M06.00 SERONEGATIVE RHEUMATOID ARTHRITIS (HCC): ICD-10-CM

## 2023-04-06 DIAGNOSIS — M06.09 RHEUMATOID ARTHRITIS OF MULTIPLE SITES WITH NEGATIVE RHEUMATOID FACTOR (HCC): ICD-10-CM

## 2023-04-06 RX ORDER — AMOXICILLIN AND CLAVULANATE POTASSIUM 875; 125 MG/1; MG/1
1 TABLET, FILM COATED ORAL EVERY 12 HOURS SCHEDULED
Qty: 20 TABLET | Refills: 0 | Status: SHIPPED | OUTPATIENT
Start: 2023-04-06 | End: 2023-04-16

## 2023-04-06 NOTE — PROGRESS NOTES
Name: Da Hagen      : 1960      MRN: [id number]  Encounter Provider: Arin Hamilton MD  Encounter Date: 2023   Encounter department: [facility]     1  Acute non-recurrent maxillary sinusitis  -     amoxicillin-clavulanate (Augmentin) 875-125 mg per tablet; Take 1 tablet by mouth every 12 (twelve) hours for 10 days    2  Seronegative rheumatoid arthritis ([facility] [address] )    3  Recurrent major depressive disorder, in full remission ([facility] [address] )  Assessment & Plan:  zoloft as directed      4  Rheumatoid arthritis of multiple sites with negative rheumatoid factor (HCC)  Assessment & Plan:  enbrel once a week   Sees rheumatologist every 6 months       5  Age-related osteoporosis without current pathological fracture  Assessment & Plan:  Fosamax as directed   Started on              Subjective      URI   This is a new problem  The current episode started 1 to 4 weeks ago  The problem has been gradually worsening  There has been no fever  Associated symptoms include congestion, headaches, rhinorrhea and sinus pain  Pertinent negatives include no abdominal pain, chest pain, coughing, diarrhea, dysuria, ear pain, joint pain, joint swelling, nausea, neck pain, plugged ear sensation, rash, sneezing, sore throat, swollen glands, vomiting or wheezing  She has tried increased fluids and acetaminophen (nasocort, mucinex, cold medication  , flonase) for the symptoms  The treatment provided mild relief  Review of Systems   HENT: Positive for congestion, rhinorrhea and sinus pain  Negative for ear pain, sneezing and sore throat  Respiratory: Negative for cough and wheezing  Cardiovascular: Negative for chest pain  Gastrointestinal: Negative for abdominal pain, diarrhea, nausea and vomiting  Genitourinary: Negative for dysuria  Musculoskeletal: Negative for joint pain and neck pain  Skin: Negative for rash  Neurological: Positive for headaches         Current Outpatient Medications on File Prior to Visit   Medication Sig   • alendronate (FOSAMAX) 70 mg tablet Take 1 tablet (70 mg total) by mouth every 7 days   • BD Pen Needle Micro U/F 32G X 6 MM MISC USE DAILY AS DIRECTED   • Calcium Carbonate-Vit D-Min (CALCIUM 1200 PO) Take by mouth every morning   • etanercept (Enbrel SureClick) 50 MG/ML injection Inject 1 mL (50 mg total) under the skin once a week   • Ferrous Sulfate (IRON PO) Take by mouth every other day Last dose 3/29/21   • levocetirizine (XYZAL) 5 MG tablet Take 5 mg by mouth daily as needed for allergies   • liraglutide (Saxenda) injection 3 mg daily   • multivitamin (THERAGRAN) TABS Take 1 tablet by mouth daily Last dose 3/29/21   • sertraline (ZOLOFT) 100 mg tablet TAKE 1 TABLET BY MOUTH EVERY DAY   • Triamcinolone Acetonide (NASACORT ALLERGY 24HR) 55 MCG/ACT AERO 2 sprays each nostril daily   • VITAMIN D PO Take 10,000 Units by mouth every morning   • zolpidem (AMBIEN) 10 mg tablet TAKE 1 TABLET BY MOUTH DAILY AT BEDTIME AS NEEDED FOR SLEEP  • omeprazole (PriLOSEC) 40 MG capsule TAKE 1 CAPSULE BY MOUTH EVERY DAY (Patient not taking: Reported on 9/13/2022)       Objective     /74 (BP Location: Left arm, Patient Position: Sitting, Cuff Size: Adult)   Pulse 76   Temp 99 5 °F (37 5 °C) (Tympanic)   Resp 16   SpO2 98%     Physical Exam  Constitutional:       Appearance: Normal appearance  HENT:      Head: Normocephalic and atraumatic  Nose: Congestion present  No rhinorrhea  Eyes:      Pupils: Pupils are equal, round, and reactive to light  Cardiovascular:      Rate and Rhythm: Normal rate and regular rhythm  Pulmonary:      Effort: Pulmonary effort is normal       Breath sounds: Normal breath sounds  Skin:     Capillary Refill: Capillary refill takes less than 2 seconds  Neurological:      General: No focal deficit present  Mental Status: She is alert and oriented to person, place, and time     Psychiatric:         Mood and Affect: Mood normal          Behavior: Behavior normal        Lacretia MD Joy

## 2023-04-26 DIAGNOSIS — E66.3 OVERWEIGHT (BMI 25.0-29.9): ICD-10-CM

## 2023-04-26 RX ORDER — LIRAGLUTIDE 6 MG/ML
INJECTION, SOLUTION SUBCUTANEOUS
Qty: 45 ML | Refills: 3 | Status: SHIPPED | OUTPATIENT
Start: 2023-04-26

## 2023-05-09 ENCOUNTER — DOCUMENTATION (OUTPATIENT)
Dept: FAMILY MEDICINE CLINIC | Facility: CLINIC | Age: 63
End: 2023-05-09

## 2023-05-09 NOTE — PROGRESS NOTES
Prior authorization started 5/5/23  Key [id number]    Denied 5/8/2023    Called patient and left a message about the denieal

## 2023-05-14 NOTE — PROGRESS NOTES
Assessment and Plan:   Ms Cheyenne York a 58year-old female with history significant for seronegative rheumatoid arthritis, osteoporosis and lumbar degenerative arthritis who presents for a follow-up   She is currently on subcutaneous Enbrel 50 mg weekly      # Seronegative rheumatoid arthritis  - Hermelinda presents today for a follow-up of seronegative rheumatoid arthritis which has been very stable on subcutaneous Enbrel 50 mg once weekly  She will continue this unchanged and update high risk medication lab monitoring    - She is reporting a recent flare up of the right CMC osteoarthritis pain and a repeat injection was provided to her  If her pain recurs soon I recommend establishing with hand orthopedics      # Lumbar DJD  - Continue follow up with Pain Management as needed        # Osteoporosis  - She will continue oral alendronate 70 mg once weekly which was started in 8/2022  She will also continue daily calcium and vitamin-D supplements as well as weight-bearing exercises  A DEXA scan can be updated in 7/2024  Small joint arthrocentesis: R thumb CMC  [place] Protocol:  Consent: Verbal consent obtained    Risks and benefits: risks, benefits and alternatives were discussed  Consent given by: patient  Patient understanding: patient states understanding of the procedure being performed  Patient consent: the patient's understanding of the procedure matches consent given  Procedure consent: procedure consent matches procedure scheduled  Site marked: the operative site was marked  Supporting Documentation  Indications: pain   Procedure Details  Location: thumb - R thumb CMC  Preparation: Patient was prepped and draped in the usual sterile fashion  Needle size: 27 G  Ultrasound guidance: no  Approach: dorsal    Patient tolerance: patient tolerated the procedure well with no immediate complications  Dressing:  Sterile dressing applied    Patient did feel lightheaded, dizzy with diaphoresis after the injection  After resting for 5 minutes her symptoms resolved  Plan:  Diagnoses and all orders for this visit:    Seronegative rheumatoid arthritis ([address] )    Long-term use of immunosuppressant medication  -     CBC and differential; Future  -     Comprehensive metabolic panel; Future  -     C-reactive protein; Future  -     Sedimentation rate, automated; Future    Primary osteoarthritis of first carpometacarpal joint of right hand  -     Small joint arthrocentesis  -     triamcinolone acetonide (KENALOG-40) 40 mg/mL injection 40 mg  -     bupivacaine (PF) (MARCAINE) 0 25 % injection 0 5 mL  -     Small joint arthrocentesis: R thumb CMC    Osteoarthritis of lumbar spine, unspecified spinal osteoarthritis complication status    Age-related osteoporosis without current pathological fracture  -     alendronate (FOSAMAX) 70 mg tablet; Take 1 tablet (70 mg total) by mouth every 7 days    Long term use of bisphosphonates      Activities as tolerated  Exercise: try to maintain a low impact exercise regimen as much as possible  Walk for 30 minutes a day for at least 3 days a week  Continue other medications as prescribed by PCP and other specialists  RTC in 1 year          HPI    INITIAL VISIT NOTE:  Ms Twila Desir a 69-year-old female with history significant for rheumatoid arthritis, osteopenia and lumbar degenerative arthritis, who presents for further management of rheumatoid arthritis and establishing with [facility]  Shanda Beasley is currently on subcutaneous Enbrel 50 mg weekly and methotrexate 8 tablets weekly  Shanda Beasley is referred by Dr Essence Tomas     Patient reports she was diagnosed with rheumatoid arthritis at the age of 27 when she initially presented with left knee pain and swelling   She states since then it has gradually progressed to involve multiple joints   She has tried multiple medications including hydroxychloroquine which caused a skin rash, gold injections, sulfasalazine and possibly other injectables although she cannot recall the names  Laurita Davidson has been well maintained on a combination of subcutaneous Enbrel 50 mg weekly and methotrexate 8 tablets weekly with folic acid 1 mg daily for the past 18-20 years  Laurita Davidson has not required to change her medications   She states overall her joint pains have been well controlled although on occasion she will notice an achiness sensation involving her hands, wrists and elbows   Intermittently she will notice swelling affecting her hands and feet   She also experiences morning stiffness primarily involving her hands and feet, and states the feet stiffness resolves right away but the hand stiffness persists for less than 30 minutes   She is currently not taking any steroids or over-the-counter pain medications   She refrains from NSAID use due to a history of esophagitis  Laurita Davidson mentions previously she had been on chronic courses of steroids but has not required this in the past few years   Within the last year she has been dealing with increasing pain affecting her lower back radiating into her bilateral legs associated with a sensation of weakness  Laurita Davidson was seen by Orthopedics in [place] and apparently had imaging done which showed osteoarthritic changes affecting her lumbar spine   She did complete a course of physical therapy prior to relocating to this area  Laurita Davidson is not interested in seeing the [facility] at this time  Laurita Davidson manages her pain with Tylenol as needed      Overall she has been tolerating the Enbrel and methotrexate well without any concerns for side effects   She denies fevers, chills, unintentional weight loss, mouth ulcers, skin rash or GI issues   No other complaints noted at this time         9/9/2019:  Patient presents for a follow-up today  Laurita Davidson is currently on subcutaneous Enbrel 50 mg weekly and oral methotrexate 8 tablets weekly with folic acid 1 mg daily      We reviewed her labs done following the prior office visit which revealed an unremarkable CBC, CMP, ESR, CRP, chronic hepatitis panel, QuantiFERON TB gold, vitamin-D, rheumatoid factor and anti CCP antibody   X-rays of her bilateral hands and feet did not show any inflammatory changes, but showed very minimal degenerative changes      She reports overall being stable on her current regimen of the Enbrel and methotrexate   She has not had any flare-ups of joint pain or swelling   Only on occasion will she experience mild pain around her hips and knees   She does experience morning stiffness which affects her diffusely, but lasts only a few minutes   She has not had to take any over-the-counter pain medications for her joint pains   She has been tolerating both medications well without any concerns for side effects or recurrent infections   When she does experience an achy sensation in her bilateral legs she does take Tylenol if needed      She is unsure when her last bone density exam was, but thinks it may have been more than 2 years ago  Olive Muniz does take calcium and vitamin-D supplements daily for a history of osteopenia      She denies any other complaints at this time         1/10/2020:  Patient presents for follow-up of seronegative rheumatoid arthritis  Olive Muniz is currently on oral methotrexate at 7 tablets once weekly with folic acid 1 mg daily, and Enbrel 50 mg once weekly   We reviewed her labs done following the prior office visit which showed an unremarkable CBC and CMP      She reports overall with decreasing her methotrexate from 8-7 tablets weekly there was no flare-up noted in her joint pains   She is currently denying any joint pain or swelling   She experiences 1 minutes of morning stiffness affecting her hands and feet   She has not had to take any over-the-counter pain medications   She has overall been tolerating the methotrexate and Enbrel well without any concerns for side effects or infections      She is yet to schedule the DEXA scan   She does take calcium and vitamin-D supplements daily         5/18/2020:  Patient presents for a follow-up of seronegative rheumatoid arthritis  Venkat Granados is currently on oral methotrexate at 6 tablets once weekly with folic acid 1 mg daily, and Enbrel 50 mg once weekly  Venkat Granados is due to get her high risk medication lab monitoring done   We reviewed her DEXA scan done in February 2020 which showed osteopenia, but her 10 year risk of major osteoporotic fracture as calculated by the FRAX score was slightly elevated at 23 8%      In terms of the rheumatoid arthritis, she reports that she is overall doing well at this time and denies any significant concerns for joint flare-ups including pain, swelling or stiffness   She is tolerating her regimen well without any concerns for side effects or infections   Over the past 2 months she has noticed left elbow pain which was initially sharp, but now she describes it as a dull constant pain   She states that activities such as lifting heavy items will worsen the pain and she feels slightly decreased strength in that arm   She does take Tylenol as needed which helps      For the osteopenia history, she does take daily calcium supplements but not vitamin-D   She denies a history of fragility fractures         9/21/2020:  Patient presents for a follow-up of seronegative rheumatoid arthritis  Venkat Granados is currently on oral methotrexate at 6 tablets once weekly with folic acid 1 mg daily, and Enbrel 50 mg once weekly   I reviewed her labs from July which were normal      She reports in terms of the rheumatoid arthritis she has been doing very well and denies any joint pains or swelling   She does experience morning stiffness of her hands which takes a few minutes to improve   She has been tolerating the methotrexate and Enbrel well without any concerns for side effects or infections      Over the past few months she has been dealing with low back pain with radiation down her right leg for which she contacted me  Venkat Granados was referred to Orthopedics and had an MRI of her lumbar spine done which showed multilevel disc herniations and bulges   She is going to be seeing pain management on Friday to discuss interventional procedures      For the osteopenia history she is on daily calcium and vitamin-D supplements  Tomas Avina denies a history of fragility fractures         3/22/2021:  Patient presents for a follow-up of seronegative rheumatoid arthritis  Tomas Avina is currently on oral methotrexate at 5-6 tablets once weekly with folic acid 1 mg daily, and Enbrel 50 mg once weekly   I reviewed her labs from December which were normal      She reports overall from a rheumatoid arthritis perspective she has been doing well   She did have an episode of pain affecting her left elbow which resolved, but it may still be sensitive to light touch   No other flare-ups of joint pains or swelling   She does experience morning stiffness which mostly affects her hands and resolves within a few minutes   She has not been taking any over-the-counter NSAIDs   She has tolerated the methotrexate and Enbrel well without any concerns for side effects or infections      She did follow with Orthopedics for the low back pain with right-sided radicular symptoms for which she was seen by pain management and had a steroid injection done which provided her with good relief   She reports similar symptoms now occurring down her left leg      For the osteopenia history she is on daily calcium and vitamin-D supplements  Tomas [place] denies a history of fragility fractures         9/9/2021:  Patient presents for a follow-up of seronegative rheumatoid arthritis  Tomas Avina is currently on oral methotrexate at 5 tablets once weekly with folic acid 1 mg daily, and Enbrel 50 mg once weekly   She has not had recent high risk medication lab monitoring done       She reports overall from a rheumatoid arthritis perspective she has been stable    She did try to wean off the methotrexate and states that when she discontinued it she noticed worsening stiffness and swelling in her hands  In view of this she did restart the methotrexate at 5 tablets once weekly and states that the stiffness has improved as well as the swelling but she is still feeling like her knuckles (PIPs) are big  Otherwise no significant peripheral joint pains or swelling  She does experience morning stiffness in her ankles and feet that takes less than an hour to improve  She is not taking any over-the-counter pain medications  She has tolerated the methotrexate and Enbrel well without any concerns for side effects or infections      For the osteopenia history she is on daily calcium and vitamin-D supplements   She denies a history of fragility fractures  5/12/2022:  Patient presents for a follow-up of seronegative rheumatoid arthritis  Laurita Davidson is currently on oral methotrexate at 4 tablets once weekly with folic acid 1 mg daily and Enbrel 50 mg once weekly  I reviewed her CBC and CMP done in March which were unremarkable  She reports overall from a rheumatoid arthritis perspective she has been stable and not had any flare-ups in joint pains, swelling or stiffness  She is still dealing with the low back and left hip region pain but did not respond to injections administered by [facility]  Gabapentin was also ineffective so she discontinued it  She will follow-up with them as needed        She reports over the past month she has noticed intermittent pain at the base of her right thumb  She has been using Voltaren gel twice a day which does help her  For the osteopenia history she is on daily calcium and vitamin-D supplements   She denies a history of fragility fractures  8/3/2022:  Patient presents for an acute visit today due to right base of thumb pain she has been experiencing  She would like an injection      We did also review her recent DEXA scan which shows osteoporosis based on the lumbar spine with a 3% decrease in the bone mineral density of the lumbar spine and a 4% decrease in the total bone mineral density of the bilateral hips  Her 10 year risk of hip fracture is 3% with a 10 year risk of major osteoporotic fracture of 25%  In view of this we discussed starting her on weekly alendronate and she did so last week  11/14/2022:  Patient presents for a follow-up of seronegative rheumatoid arthritis  She is currently on subcutaneous Enbrel 50 mg once weekly  I reviewed her recent labs done in October which showed an unremarkable CBC, CMP, ESR and CRP  She reports she has overall been doing well without any flare-ups in joint pain, swelling or stiffness  Since the last office visit she gradually tapered down on the methotrexate and has been off the medication for the past 1 and half months  She has not noticed any change in her joint pains with doing so  She reports the right [facility] intra-articular cortisone injection she received in August significantly helped her       5/15/2023:  Patient presents for a follow-up of seronegative rheumatoid arthritis  She is currently on subcutaneous Enbrel 50 mg once weekly  She reports she has overall been doing well without any flare-ups in joint pain, swelling or stiffness, except for intermittent pain she is again experiencing at her right [facility] joint  She has been tolerating the Enbrel well without any concerns for side effects or infections  She is also taking alendronate 70 mg once weekly for osteoporosis  No interim fractures  The following portions of the patient's history were reviewed and updated as appropriate: allergies, current medications, past family history, past medical history, past social history, past surgical history and problem list       Review of Systems  Constitutional: Negative for weight change, fevers, chills, night sweats, fatigue    ENT/Mouth: Negative for hearing changes, ear pain, nasal congestion, sinus pain, "hoarseness, sore throat, rhinorrhea, swallowing difficulty  Eyes: Negative for pain, redness, discharge, vision changes  Cardiovascular: Negative for chest pain, SOB, palpitations  Respiratory: Negative for cough, sputum, wheezing, dyspnea  Gastrointestinal: Negative for nausea, vomiting, diarrhea, constipation, pain, heartburn  Genitourinary: Negative for dysuria, urinary frequency, hematuria  Musculoskeletal: As per HPI  Skin: Negative for skin rash, color changes  Neuro: Negative for weakness, numbness, tingling, loss of consciousness  Psych: Negative for anxiety, depression  Heme/Lymph: Negative for easy bruising, bleeding, lymphadenopathy          Past Medical History:   Diagnosis Date   • Allergic rhinitis    • Anxiety    • Colon polyp    • Depression    • Epigastric pain     from hiatal hernia   • Hiatal hernia    • Insomnia    • Iron deficiency anemia    • Lumbar spondylosis 9/25/2020   • Osteoporosis    • RA (rheumatoid arthritis) ([facility] )    • Rheumatoid arthritis ([facility] )        Past Surgical History:   Procedure Laterality Date   • ABDOMINAL SURGERY      \"tummy tuck\"   • AUGMENTATION MAMMAPLASTY Bilateral 2010   • BREAST BIOPSY Right 2008    benign   • COLONOSCOPY     • KNEE ARTHROSCOPY Bilateral    • LASIK     • SINUS SURGERY         Social History     Socioeconomic History   • Marital status: /Civil Union     Spouse name: Not on file   • Number of children: Not on file   • Years of education: Not on file   • Highest education level: Not on file   Occupational History   • Not on file   Tobacco Use   • Smoking status: Never   • Smokeless tobacco: Never   Vaping Use   • Vaping Use: Never used   Substance and Sexual Activity   • Alcohol use: Not Currently     Comment: occ   • Drug use: Not Currently   • Sexual activity: Not on file   Other Topics Concern   • Not on file   Social History Narrative   • Not on file     Social Determinants of Health     Financial Resource Strain: Not on file " Food Insecurity: Not on file   Transportation Needs: Not on file   Physical Activity: Not on file   Stress: Not on file   Social Connections: Not on file   Intimate Partner Violence: Not on file   Housing Stability: Not on file       Family History   Problem Relation Age of Onset   • Hypertension Mother    • Cancer Mother         breast   • Stroke Mother    • Crohn's disease Mother    • Hyperlipidemia Mother    • Heart attack Mother    • Breast cancer Mother 79   • Heart disease Mother         MI   • Hypertension Father    • Stroke Father    • Hyperlipidemia Father    • Heart attack Father         ?    • Skin cancer Father    • Hyperlipidemia Brother        Allergies   Allergen Reactions   • Plaquenil [Hydroxychloroquine] Rash       Current Outpatient Medications:   •  alendronate (FOSAMAX) 70 mg tablet, Take 1 tablet (70 mg total) by mouth every 7 days, Disp: 12 tablet, Rfl: 3  •  BD Pen Needle Micro U/F 32G X 6 MM MISC, USE DAILY AS DIRECTED, Disp: 100 each, Rfl: 3  •  Calcium Carbonate-Vit D-Min (CALCIUM 1200 PO), Take by mouth every morning, Disp: , Rfl:   •  etanercept (Enbrel SureClick) 50 MG/ML injection, Inject 1 mL (50 mg total) under the skin once a week, Disp: 4 mL, Rfl: 5  •  Ferrous Sulfate (IRON PO), Take by mouth every other day Last dose 3/29/21, Disp: , Rfl:   •  levocetirizine (XYZAL) 5 MG tablet, Take 5 mg by mouth daily as needed for allergies, Disp: , Rfl:   •  liraglutide (Saxenda) injection, INJECT 3MG DAIILY, Disp: 45 mL, Rfl: 3  •  multivitamin (THERAGRAN) TABS, Take 1 tablet by mouth daily Last dose 3/29/21, Disp: , Rfl:   •  omeprazole (PriLOSEC) 40 MG capsule, TAKE 1 CAPSULE BY MOUTH EVERY DAY (Patient not taking: Reported on 9/13/2022), Disp: 90 capsule, Rfl: 1  •  sertraline (ZOLOFT) 100 mg tablet, TAKE 1 TABLET BY MOUTH EVERY DAY, Disp: 90 tablet, Rfl: 3  •  Triamcinolone Acetonide (NASACORT ALLERGY 24HR) 55 MCG/ACT AERO, 2 sprays each nostril daily, Disp: 16 5 g, Rfl: 3  •  VITAMIN D PO, Take 10,000 Units by mouth every morning, Disp: , Rfl:   •  zolpidem (AMBIEN) 10 mg tablet, TAKE 1 TABLET BY MOUTH DAILY AT BEDTIME AS NEEDED FOR SLEEP , Disp: 90 tablet, Rfl: 0  No current facility-administered medications for this visit  Objective:    Vitals:    05/15/23 1422   BP: 130/82   Pulse: 71       Physical Exam  General: Well appearing, well nourished, in no distress  Oriented x 3, normal mood and affect  Ambulating without difficulty  Skin: Good turgor, no rash, unusual bruising or prominent lesions  Hair: Normal texture and distribution  Nails: Normal color, no deformities  HEENT:  Head: Normocephalic, atraumatic  Eyes: Conjunctiva clear, sclera non-icteric, EOM intact  Extremities: No amputations or deformities, cyanosis, edema  Musculoskeletal: Right CMC squaring noted with tenderness  Neurologic: Alert and oriented  No focal neurological deficits appreciated  Psychiatric: Normal mood and affect  FELIPE Barkley    Rheumatology

## 2023-05-15 ENCOUNTER — OFFICE VISIT (OUTPATIENT)
Dept: RHEUMATOLOGY | Facility: CLINIC | Age: 63
End: 2023-05-15

## 2023-05-15 ENCOUNTER — TELEPHONE (OUTPATIENT)
Dept: RHEUMATOLOGY | Facility: CLINIC | Age: 63
End: 2023-05-15

## 2023-05-15 VITALS — SYSTOLIC BLOOD PRESSURE: 130 MMHG | HEART RATE: 71 BPM | DIASTOLIC BLOOD PRESSURE: 82 MMHG

## 2023-05-15 DIAGNOSIS — M06.00 SERONEGATIVE RHEUMATOID ARTHRITIS (HCC): ICD-10-CM

## 2023-05-15 DIAGNOSIS — M47.816 OSTEOARTHRITIS OF LUMBAR SPINE, UNSPECIFIED SPINAL OSTEOARTHRITIS COMPLICATION STATUS: ICD-10-CM

## 2023-05-15 DIAGNOSIS — M81.0 AGE-RELATED OSTEOPOROSIS WITHOUT CURRENT PATHOLOGICAL FRACTURE: ICD-10-CM

## 2023-05-15 DIAGNOSIS — M06.00 SERONEGATIVE RHEUMATOID ARTHRITIS (HCC): Primary | ICD-10-CM

## 2023-05-15 DIAGNOSIS — Z79.60 LONG-TERM USE OF IMMUNOSUPPRESSANT MEDICATION: ICD-10-CM

## 2023-05-15 DIAGNOSIS — Z79.83 LONG TERM USE OF BISPHOSPHONATES: ICD-10-CM

## 2023-05-15 DIAGNOSIS — M18.11 PRIMARY OSTEOARTHRITIS OF FIRST CARPOMETACARPAL JOINT OF RIGHT HAND: ICD-10-CM

## 2023-05-15 RX ORDER — BUPIVACAINE HYDROCHLORIDE 2.5 MG/ML
0.5 INJECTION, SOLUTION EPIDURAL; INFILTRATION; INTRACAUDAL ONCE
Status: COMPLETED | OUTPATIENT
Start: 2023-05-15 | End: 2023-05-15

## 2023-05-15 RX ORDER — ALENDRONATE SODIUM 70 MG/1
70 TABLET ORAL
Qty: 12 TABLET | Refills: 3 | Status: SHIPPED | OUTPATIENT
Start: 2023-05-15

## 2023-05-15 RX ORDER — TRIAMCINOLONE ACETONIDE 40 MG/ML
40 INJECTION, SUSPENSION INTRA-ARTICULAR; INTRAMUSCULAR ONCE
Status: COMPLETED | OUTPATIENT
Start: 2023-05-15 | End: 2023-05-15

## 2023-05-15 RX ADMIN — TRIAMCINOLONE ACETONIDE 40 MG: 40 INJECTION, SUSPENSION INTRA-ARTICULAR; INTRAMUSCULAR at 15:34

## 2023-05-15 RX ADMIN — BUPIVACAINE HYDROCHLORIDE 0.5 ML: 2.5 INJECTION, SOLUTION EPIDURAL; INFILTRATION; INTRACAUDAL at 15:34

## 2023-05-16 ENCOUNTER — TELEPHONE (OUTPATIENT)
Dept: OBGYN CLINIC | Facility: MEDICAL CENTER | Age: 63
End: 2023-05-16

## 2023-05-16 NOTE — TELEPHONE ENCOUNTER
Caller: Rafael Dudley from [facility] [employer]    Doctor: Nita Tellez    Reason for call:     Rafael Dudley is faxing form to be complete questions and to obtain prior authorization for the Enbrel  Sure Click  Please watch for form to come through the fax      Call back#: n/a

## 2023-05-17 RX ORDER — MEDROXYPROGESTERONE ACETATE 150 MG/ML
50 INJECTION, SUSPENSION INTRAMUSCULAR WEEKLY
Qty: 4 ML | Refills: 5 | Status: SHIPPED | OUTPATIENT
Start: 2023-05-17

## 2023-05-19 ENCOUNTER — OFFICE VISIT (OUTPATIENT)
Dept: FAMILY MEDICINE CLINIC | Facility: CLINIC | Age: 63
End: 2023-05-19

## 2023-05-19 VITALS
HEART RATE: 98 BPM | SYSTOLIC BLOOD PRESSURE: 130 MMHG | RESPIRATION RATE: 16 BRPM | DIASTOLIC BLOOD PRESSURE: 86 MMHG | OXYGEN SATURATION: 98 % | TEMPERATURE: 97.5 F

## 2023-05-19 DIAGNOSIS — J02.9 SORE THROAT: ICD-10-CM

## 2023-05-19 DIAGNOSIS — J06.9 ACUTE URI: Primary | ICD-10-CM

## 2023-05-19 DIAGNOSIS — R05.1 ACUTE COUGH: ICD-10-CM

## 2023-05-19 LAB — S PYO AG THROAT QL: NEGATIVE

## 2023-05-19 RX ORDER — AMOXICILLIN 500 MG/1
500 CAPSULE ORAL EVERY 8 HOURS SCHEDULED
Qty: 30 CAPSULE | Refills: 0 | Status: SHIPPED | OUTPATIENT
Start: 2023-05-19 | End: 2023-05-29

## 2023-05-19 RX ORDER — LIDOCAINE HYDROCHLORIDE 20 MG/ML
10 SOLUTION OROPHARYNGEAL 3 TIMES DAILY PRN
Qty: 200 ML | Refills: 0 | Status: SHIPPED | OUTPATIENT
Start: 2023-05-19 | End: 2023-05-19

## 2023-05-19 NOTE — PROGRESS NOTES
Name: Alia Flanagan      : 1960      MRN: [id number]  Encounter Provider: Karla López MD  Encounter Date: 2023   Encounter department: [place] [address]  Acute URI  Comments:  hydration ,rest    2  Sore throat  -     POCT rapid strepA  -     amoxicillin (AMOXIL) 500 mg capsule; Take 1 capsule (500 mg total) by mouth every 8 (eight) hours for 10 days  -     al mag oxide-diphenhydramine-lidocaine viscous (MAGIC MOUTHWASH) 1:1:1 suspension; Swish and spit 10 mL every 4 (four) hours as needed for mouth pain or discomfort    3  Acute cough  -     COVID Only- Office Collect         Subjective      Sore Throat   This is a new problem  Episode onset: 3 days ago  There has been no fever  The pain is moderate  Pertinent negatives include no abdominal pain, congestion, coughing, diarrhea, drooling, ear discharge, ear pain, headaches, hoarse voice, plugged ear sensation, neck pain, shortness of breath, stridor, swollen glands, trouble swallowing or vomiting  She has had no exposure to strep or mono  Review of Systems   HENT: Positive for sore throat  Negative for congestion, drooling, ear discharge, ear pain, hoarse voice and trouble swallowing  Respiratory: Negative for cough, shortness of breath and stridor  Gastrointestinal: Negative for abdominal pain, diarrhea and vomiting  Musculoskeletal: Negative for neck pain  Neurological: Negative for headaches         Current Outpatient Medications on File Prior to Visit   Medication Sig   • alendronate (FOSAMAX) 70 mg tablet Take 1 tablet (70 mg total) by mouth every 7 days   • BD Pen Needle Micro U/F 32G X 6 MM MISC USE DAILY AS DIRECTED   • Calcium Carbonate-Vit D-Min (CALCIUM 1200 PO) Take by mouth every morning   • etanercept (Enbrel SureClick) 50 MG/ML injection Inject 1 mL (50 mg total) under the skin once a week   • Ferrous Sulfate (IRON PO) Take by mouth every other day Last dose 3/29/21   • levocetirizine (XYZAL) 5 MG tablet Take 5 mg by mouth daily as needed for allergies   • liraglutide (Saxenda) injection INJECT 3MG DAIILY   • multivitamin (THERAGRAN) TABS Take 1 tablet by mouth daily Last dose 3/29/21   • sertraline (ZOLOFT) 100 mg tablet TAKE 1 TABLET BY MOUTH EVERY DAY   • Triamcinolone Acetonide (NASACORT ALLERGY 24HR) 55 MCG/ACT AERO 2 sprays each nostril daily   • VITAMIN D PO Take 10,000 Units by mouth every morning   • zolpidem (AMBIEN) 10 mg tablet TAKE 1 TABLET BY MOUTH DAILY AT BEDTIME AS NEEDED FOR SLEEP  • omeprazole (PriLOSEC) 40 MG capsule TAKE 1 CAPSULE BY MOUTH EVERY DAY (Patient not taking: Reported on 9/13/2022)       Objective     /86 (BP Location: Left arm, Patient Position: Sitting, Cuff Size: Large)   Pulse 98   Temp 97 5 °F (36 4 °C) (Temporal)   Resp 16   SpO2 98%     Physical Exam  Constitutional:       Appearance: She is well-developed  HENT:      Head: Normocephalic and atraumatic  Right Ear: Tympanic membrane normal       Left Ear: Tympanic membrane normal       Mouth/Throat:      Mouth: Mucous membranes are moist  No oral lesions  Pharynx: Pharyngeal swelling and posterior oropharyngeal erythema present  No oropharyngeal exudate  Cardiovascular:      Rate and Rhythm: Normal rate and regular rhythm  Pulmonary:      Effort: Pulmonary effort is normal  No respiratory distress  Breath sounds: Normal breath sounds  Abdominal:      Palpations: Abdomen is soft  Lymphadenopathy:      Cervical: Cervical adenopathy present  Neurological:      General: No focal deficit present     Psychiatric:         Mood and Affect: Mood normal        Lester Garrett MD

## 2023-05-21 RX ORDER — LIDOCAINE HYDROCHLORIDE 20 MG/ML
10 SOLUTION OROPHARYNGEAL 3 TIMES DAILY PRN
Qty: 200 ML | Refills: 0 | OUTPATIENT
Start: 2023-05-21

## 2023-05-22 LAB
B-HEM STREP SPEC QL CULT: NEGATIVE
SARS-COV-2 RNA RESP QL NAA+PROBE: NEGATIVE

## 2023-05-24 ENCOUNTER — NEW REFERRAL (OUTPATIENT)
Dept: URBAN - METROPOLITAN AREA CLINIC 52 | Facility: CLINIC | Age: 63
End: 2023-05-24

## 2023-05-24 DIAGNOSIS — H25.13: ICD-10-CM

## 2023-05-24 DIAGNOSIS — H43.391: ICD-10-CM

## 2023-05-24 DIAGNOSIS — H43.812: ICD-10-CM

## 2023-05-24 DIAGNOSIS — H52.13: ICD-10-CM

## 2023-05-24 PROCEDURE — 92134 CPTRZ OPH DX IMG PST SGM RTA: CPT

## 2023-05-24 PROCEDURE — 99244 OFF/OP CNSLTJ NEW/EST MOD 40: CPT

## 2023-05-24 PROCEDURE — 92201 OPSCPY EXTND RTA DRAW UNI/BI: CPT

## 2023-05-24 ASSESSMENT — VISUAL ACUITY
OD_CC: 20/20-1
OS_CC: 20/20-2

## 2023-05-24 ASSESSMENT — TONOMETRY
OD_IOP_MMHG: 12
OS_IOP_MMHG: 11

## 2023-06-17 DIAGNOSIS — F51.01 PRIMARY INSOMNIA: ICD-10-CM

## 2023-06-19 RX ORDER — ZOLPIDEM TARTRATE 10 MG/1
TABLET ORAL
Qty: 90 TABLET | Refills: 0 | Status: SHIPPED | OUTPATIENT
Start: 2023-06-19 | End: 2023-06-21

## 2023-06-20 DIAGNOSIS — M81.0 AGE-RELATED OSTEOPOROSIS WITHOUT CURRENT PATHOLOGICAL FRACTURE: ICD-10-CM

## 2023-06-20 DIAGNOSIS — R92.2 DENSE BREASTS: Primary | ICD-10-CM

## 2023-06-20 DIAGNOSIS — Z80.3 FAMILY HISTORY OF BREAST CANCER: ICD-10-CM

## 2023-06-20 RX ORDER — ALENDRONATE SODIUM 70 MG/1
70 TABLET ORAL
Qty: 12 TABLET | Refills: 3 | Status: SHIPPED | OUTPATIENT
Start: 2023-06-20

## 2023-06-21 DIAGNOSIS — F51.01 PRIMARY INSOMNIA: ICD-10-CM

## 2023-06-21 RX ORDER — ZOLPIDEM TARTRATE 10 MG/1
TABLET ORAL
Qty: 90 TABLET | Refills: 0 | Status: SHIPPED | OUTPATIENT
Start: 2023-06-21

## 2023-07-17 ENCOUNTER — HOSPITAL ENCOUNTER (OUTPATIENT)
Dept: RADIOLOGY | Facility: HOSPITAL | Age: 63
Discharge: HOME | End: 2023-07-17
Payer: COMMERCIAL

## 2023-07-17 VITALS — WEIGHT: 142 LBS | HEIGHT: 65 IN | BODY MASS INDEX: 23.66 KG/M2

## 2023-07-17 DIAGNOSIS — Z80.3 FAMILY HISTORY OF BREAST CANCER: ICD-10-CM

## 2023-07-17 DIAGNOSIS — R92.2 DENSE BREASTS: ICD-10-CM

## 2023-07-17 PROCEDURE — G1004 CDSM NDSC: HCPCS

## 2023-07-17 PROCEDURE — C8937 CAD BREAST MRI: HCPCS

## 2023-07-17 PROCEDURE — A9585 GADOBUTROL INJECTION: HCPCS | Performed by: FAMILY MEDICINE

## 2023-07-17 PROCEDURE — C8908 MRI W/O FOL W/CONT, BREAST,: HCPCS

## 2023-07-17 RX ADMIN — GADOBUTROL 6 ML: 604.72 INJECTION INTRAVENOUS at 15:12

## 2023-07-21 ENCOUNTER — TELEPHONE (OUTPATIENT)
Dept: PAIN MEDICINE | Facility: MEDICAL CENTER | Age: 63
End: 2023-07-21

## 2023-07-21 NOTE — TELEPHONE ENCOUNTER
Caller: patient    Doctor: Aurelia Astudillo    Reason for call: patient seen dr Aurelia Astudillo last years and would like to see DR Chai Christina in [place], patient has a new insurance and has to go to [place] would like LIZZY to See Dr Chai Christina    Would like to schedule a procedure as well    Call back#:

## 2023-07-24 NOTE — TELEPHONE ENCOUNTER
Dr Star Rockwell or Dr Serge Wagner would be at [facility] location. Please advise on LIZZY. Dr Shay Watson is okay with transfer.

## 2023-08-01 DIAGNOSIS — E66.3 OVERWEIGHT (BMI 25.0-29.9): ICD-10-CM

## 2023-08-02 ENCOUNTER — TELEPHONE (OUTPATIENT)
Dept: PAIN MEDICINE | Facility: CLINIC | Age: 63
End: 2023-08-02

## 2023-08-02 DIAGNOSIS — E66.3 OVERWEIGHT (BMI 25.0-29.9): ICD-10-CM

## 2023-08-02 RX ORDER — LIRAGLUTIDE 6 MG/ML
INJECTION, SOLUTION SUBCUTANEOUS
Qty: 15 ML | Refills: 3 | Status: SHIPPED | OUTPATIENT
Start: 2023-08-02

## 2023-08-03 DIAGNOSIS — E66.3 OVERWEIGHT (BMI 25.0-29.9): ICD-10-CM

## 2023-08-03 RX ORDER — LIRAGLUTIDE 6 MG/ML
INJECTION, SOLUTION SUBCUTANEOUS
Refills: 3 | OUTPATIENT
Start: 2023-08-03

## 2023-08-04 DIAGNOSIS — E66.3 OVERWEIGHT (BMI 25.0-29.9): ICD-10-CM

## 2023-08-04 RX ORDER — LIRAGLUTIDE 6 MG/ML
INJECTION, SOLUTION SUBCUTANEOUS
Refills: 3 | OUTPATIENT
Start: 2023-08-04

## 2023-08-09 LAB
ALBUMIN SERPL-MCNC: 4.3 G/DL (ref 3.9–4.9)
ALBUMIN/GLOB SERPL: 1.7 {RATIO} (ref 1.2–2.2)
ALP SERPL-CCNC: 91 IU/L (ref 44–121)
ALT SERPL-CCNC: 16 IU/L (ref 0–32)
AST SERPL-CCNC: 27 IU/L (ref 0–40)
BASOPHILS # BLD AUTO: 0 X10E3/UL (ref 0–0.2)
BASOPHILS NFR BLD AUTO: 0 %
BILIRUB SERPL-MCNC: 0.3 MG/DL (ref 0–1.2)
BUN SERPL-MCNC: 13 MG/DL (ref 8–27)
BUN/CREAT SERPL: 15 (ref 12–28)
CALCIUM SERPL-MCNC: 10 MG/DL (ref 8.7–10.3)
CHLORIDE SERPL-SCNC: 106 MMOL/L (ref 96–106)
CO2 SERPL-SCNC: 21 MMOL/L (ref 20–29)
CREAT SERPL-MCNC: 0.85 MG/DL (ref 0.57–1)
CRP SERPL-MCNC: 3 MG/L (ref 0–10)
EGFR: 77 ML/MIN/1.73
EOSINOPHIL # BLD AUTO: 0 X10E3/UL (ref 0–0.4)
EOSINOPHIL NFR BLD AUTO: 0 %
ERYTHROCYTE [DISTWIDTH] IN BLOOD BY AUTOMATED COUNT: 12.2 % (ref 11.7–15.4)
ERYTHROCYTE [SEDIMENTATION RATE] IN BLOOD BY WESTERGREN METHOD: 30 MM/HR (ref 0–40)
GLOBULIN SER-MCNC: 2.5 G/DL (ref 1.5–4.5)
GLUCOSE SERPL-MCNC: 87 MG/DL (ref 70–99)
HCT VFR BLD AUTO: 36.4 % (ref 34–46.6)
HGB BLD-MCNC: 12.4 G/DL (ref 11.1–15.9)
IMM GRANULOCYTES # BLD: 0 X10E3/UL (ref 0–0.1)
IMM GRANULOCYTES NFR BLD: 0 %
LYMPHOCYTES # BLD AUTO: 2 X10E3/UL (ref 0.7–3.1)
LYMPHOCYTES NFR BLD AUTO: 37 %
MCH RBC QN AUTO: 31 PG (ref 26.6–33)
MCHC RBC AUTO-ENTMCNC: 34.1 G/DL (ref 31.5–35.7)
MCV RBC AUTO: 91 FL (ref 79–97)
MONOCYTES # BLD AUTO: 0.5 X10E3/UL (ref 0.1–0.9)
MONOCYTES NFR BLD AUTO: 9 %
NEUTROPHILS # BLD AUTO: 2.9 X10E3/UL (ref 1.4–7)
NEUTROPHILS NFR BLD AUTO: 54 %
PLATELET # BLD AUTO: 304 X10E3/UL (ref 150–450)
POTASSIUM SERPL-SCNC: 4.3 MMOL/L (ref 3.5–5.2)
PROT SERPL-MCNC: 6.8 G/DL (ref 6–8.5)
RBC # BLD AUTO: 4 X10E6/UL (ref 3.77–5.28)
SODIUM SERPL-SCNC: 141 MMOL/L (ref 134–144)
WBC # BLD AUTO: 5.4 X10E3/UL (ref 3.4–10.8)

## 2023-08-22 ENCOUNTER — APPOINTMENT (OUTPATIENT)
Dept: RADIOLOGY | Facility: CLINIC | Age: 63
End: 2023-08-22
Payer: COMMERCIAL

## 2023-08-22 ENCOUNTER — TELEPHONE (OUTPATIENT)
Dept: PAIN MEDICINE | Facility: CLINIC | Age: 63
End: 2023-08-22

## 2023-08-22 ENCOUNTER — OFFICE VISIT (OUTPATIENT)
Dept: PAIN MEDICINE | Facility: CLINIC | Age: 63
End: 2023-08-22
Payer: COMMERCIAL

## 2023-08-22 VITALS — DIASTOLIC BLOOD PRESSURE: 84 MMHG | HEART RATE: 69 BPM | SYSTOLIC BLOOD PRESSURE: 135 MMHG | TEMPERATURE: 98.2 F

## 2023-08-22 DIAGNOSIS — M47.816 LUMBAR SPONDYLOSIS: ICD-10-CM

## 2023-08-22 DIAGNOSIS — M51.26 LUMBAR DISC HERNIATION: ICD-10-CM

## 2023-08-22 DIAGNOSIS — M54.16 LUMBAR RADICULOPATHY: ICD-10-CM

## 2023-08-22 DIAGNOSIS — G89.4 CHRONIC PAIN SYNDROME: Primary | ICD-10-CM

## 2023-08-22 DIAGNOSIS — G89.4 CHRONIC PAIN SYNDROME: ICD-10-CM

## 2023-08-22 PROCEDURE — 72100 X-RAY EXAM L-S SPINE 2/3 VWS: CPT

## 2023-08-22 PROCEDURE — 99214 OFFICE O/P EST MOD 30 MIN: CPT | Performed by: PHYSICAL MEDICINE & REHABILITATION

## 2023-08-22 RX ORDER — BUDESONIDE 0.5 MG/2ML
INHALANT ORAL
COMMUNITY
Start: 2023-07-24

## 2023-08-22 NOTE — TELEPHONE ENCOUNTER
Scheduled patient for LESI 9/20/23  Patient denies RX blood thinners/ NSAIDS  Nothing to eat or drink 1 hour prior to procedure  Needs to arrange transportation  Proper clothing for procedure  No vaccines 2 weeks prior or after procedure  If ill or place on antibiotics, please call to reschedule

## 2023-08-22 NOTE — PROGRESS NOTES
Pain Medicine Follow-Up Note    Assessment:  1. Chronic pain syndrome    2. Lumbar radiculopathy    3. Lumbar disc herniation    4. Lumbar spondylosis        Plan:  Orders Placed This Encounter   Procedures   • X-ray lumbar spine 2 or 3 views     Standing Status:   Future     Number of Occurrences:   1     Standing Expiration Date:   8/22/2027     Scheduling Instructions:      Bring along any outside films relating to this procedure. New Medications Ordered This Visit   Medications   • budesonide (PULMICORT) 0.5 mg/2 mL nebulizer solution       Ms. Hall presents to [facility] for follow-up, evaluation and transfer of care regarding ongoing low back pain with radiating symptoms into bilateral lower extremities. She previously had transforaminal epidural steroid injections with Dr. Verona Engle and reports significant relief on the right and minimal improvements on the left leg. During today's evaluation she is demonstrating clinical and diagnostic evidence of continued lumbar radiculopathy with previous MRI demonstrating multilevel degenerative disc disease bulging disc at L5-S1 and multilevel foraminal narrowing notable at L3-L4, L4-L5 and 5-S1  At this time we will order updated imaging and plan for epidural steroid injection L5-S1 LESI under fluoroscopy guidance. We will also provide the patient with physician guided home exercises to be done 3 days/week for 4 to 6 weeks or longer if needed. History of Present Illness:    Hermelinda Hall is a 61 y.o. female who presents to [address] [place] [address] and [facility] for interval re-evaluation of the above stated pain complaints. The patient has a past medical and chronic pain history as outlined in the assessment section. Other than as stated above, the patient denies any interval changes in medications, medical condition, mental condition, symptoms, or allergies since the last office visit.          Review of Systems:    Review of Systems   Constitutional: Negative for unexpected weight change. HENT: Negative for ear pain. Eyes: Negative for visual disturbance. Respiratory: Negative for shortness of breath and wheezing. Gastrointestinal: Negative for abdominal pain. Musculoskeletal: Positive for back pain and gait problem. Pain that starts in the lower back and goes down both legs( not everyday) left is daily worse in the morning   Neurological: Positive for numbness and headaches. Negative for weakness. Psychiatric/Behavioral: Positive for dysphoric mood and sleep disturbance. Negative for decreased concentration. The patient is nervous/anxious. Past Medical History:   Diagnosis Date   • Allergic rhinitis    • Anxiety    • Colon polyp    • Depression    • Epigastric pain     from hiatal hernia   • Hiatal hernia    • Insomnia    • Iron deficiency anemia    • Lumbar spondylosis 09/25/2020   • Myopia    • Osteoporosis    • PVD (posterior vitreous detachment)    • RA (rheumatoid arthritis) ([address])    • Rheumatoid arthritis ([address])        Past Surgical History:   Procedure Laterality Date   • ABDOMINAL SURGERY      "tummy tuck"   • AUGMENTATION MAMMAPLASTY Bilateral 2010   • BREAST BIOPSY Right 2008    benign   • COLONOSCOPY     • KNEE ARTHROSCOPY Bilateral    • LASIK     • SINUS SURGERY         Family History   Problem Relation Age of Onset   • Hypertension Mother    • Cancer Mother         breast   • Stroke Mother    • Crohn's disease Mother    • Hyperlipidemia Mother    • Heart attack Mother    • Breast cancer Mother 79   • Heart disease Mother         MI   • Hypertension Father    • Stroke Father    • Hyperlipidemia Father    • Heart attack Father         ?    • Skin cancer Father    • Hyperlipidemia Brother        Social History     Occupational History   • Not on file   Tobacco Use   • Smoking status: Never   • Smokeless tobacco: Never   Vaping Use   • Vaping Use: Never used   Substance and Sexual Activity   • Alcohol use: Not Currently     Comment: occ   • Drug use: Not Currently   • Sexual activity: Not on file         Current Outpatient Medications:   •  alendronate (FOSAMAX) 70 mg tablet, Take 1 tablet (70 mg total) by mouth every 7 days, Disp: 12 tablet, Rfl: 3  •  BD Pen Needle Micro U/F 32G X 6 MM MISC, USE DAILY AS DIRECTED, Disp: 100 each, Rfl: 3  •  Calcium Carbonate-Vit D-Min (CALCIUM 1200 PO), Take by mouth every morning, Disp: , Rfl:   •  etanercept (Enbrel SureClick) 50 MG/ML injection, Inject 1 mL (50 mg total) under the skin once a week, Disp: 4 mL, Rfl: 5  •  Ferrous Sulfate (IRON PO), Take by mouth every other day Last dose 3/29/21, Disp: , Rfl:   •  levocetirizine (XYZAL) 5 MG tablet, Take 5 mg by mouth daily as needed for allergies, Disp: , Rfl:   •  multivitamin (THERAGRAN) TABS, Take 1 tablet by mouth daily Last dose 3/29/21, Disp: , Rfl:   •  Saxenda injection, INJECT 3MG DAIILY, Disp: 15 mL, Rfl: 3  •  sertraline (ZOLOFT) 100 mg tablet, TAKE 1 TABLET BY MOUTH EVERY DAY, Disp: 90 tablet, Rfl: 3  •  VITAMIN D PO, Take 10,000 Units by mouth every morning, Disp: , Rfl:   •  zolpidem (AMBIEN) 10 mg tablet, TAKE 1 TABLET BY MOUTH DAILY AT BEDTIME AS NEEDED FOR SLEEP., Disp: 90 tablet, Rfl: 0  •  al mag oxide-diphenhydramine-lidocaine viscous (MAGIC MOUTHWASH) 1:1:1 suspension, Swish and spit 10 mL every 4 (four) hours as needed for mouth pain or discomfort, Disp: 200 mL, Rfl: 0  •  budesonide (PULMICORT) 0.5 mg/2 mL nebulizer solution, , Disp: , Rfl:   •  omeprazole (PriLOSEC) 40 MG capsule, TAKE 1 CAPSULE BY MOUTH EVERY DAY (Patient not taking: Reported on 9/13/2022), Disp: 90 capsule, Rfl: 1  •  Triamcinolone Acetonide (NASACORT ALLERGY 24HR) 55 MCG/ACT AERO, 2 sprays each nostril daily (Patient not taking: Reported on 8/22/2023), Disp: 16.5 g, Rfl: 3    Allergies   Allergen Reactions   • Plaquenil [Hydroxychloroquine] Rash       Physical Exam:    /84   Pulse 69   Temp 98.2 °F (36.8 °C)     Constitutional:normal, well developed, well nourished, alert, in no distress and non-toxic and no overt pain behavior.   Eyes:anicteric  HEENT:grossly intact  Neck:supple, symmetric, trachea midline and no masses   Pulmonary:even and unlabored  Cardiovascular:No edema or pitting edema present  Skin:Normal without rashes or lesions and well hydrated  Psychiatric:Mood and affect appropriate  Neurologic:Cranial Nerves II-XII grossly intact  Musculoskeletal:normal      Imaging  No orders to display         Orders Placed This Encounter   Procedures   • X-ray lumbar spine 2 or 3 views

## 2023-09-05 ENCOUNTER — TELEPHONE (OUTPATIENT)
Dept: RHEUMATOLOGY | Facility: CLINIC | Age: 63
End: 2023-09-05

## 2023-09-05 DIAGNOSIS — F33.42 RECURRENT MAJOR DEPRESSIVE DISORDER, IN FULL REMISSION (HCC): ICD-10-CM

## 2023-09-05 DIAGNOSIS — F51.01 PRIMARY INSOMNIA: ICD-10-CM

## 2023-09-05 DIAGNOSIS — M06.00 SERONEGATIVE RHEUMATOID ARTHRITIS (HCC): ICD-10-CM

## 2023-09-05 NOTE — TELEPHONE ENCOUNTER
----- Message from Josiah Verma MD sent at 9/1/2023  9:54 PM EDT -----  Regarding: FW: Medication  Contact: [phone]  ----- Message -----  From: Mounika Laurent  Sent: 9/1/2023   4:56 PM EDT  To: [facility]  Subject: Medication                                       I changed insurance again. I now have Aetna choice pos II through [facility]. The prescription plan is through [employer]. My member id is [id number]. The RX bin is [id number] and RX PCN A4, and RX Group id is [id number]. The phone number to fax my prescriptions to is [phone]. Could you fax in the Enbrel and fosomax so we can get that started please? I know you won’t be able to do it till next week, but the Enbrel process has to get started. For the Aetna choice pos II my id is [id number]. Thanks for your help.   Hermelinda Celaya

## 2023-09-05 NOTE — TELEPHONE ENCOUNTER
Prior auth done via [employer] KEY#[id number] for Enbrel.     Prior Auth done via [facility] for fosamax

## 2023-09-06 DIAGNOSIS — E66.3 OVERWEIGHT (BMI 25.0-29.9): ICD-10-CM

## 2023-09-06 RX ORDER — ZOLPIDEM TARTRATE 10 MG/1
10 TABLET ORAL
Qty: 90 TABLET | Refills: 0 | Status: SHIPPED | OUTPATIENT
Start: 2023-09-06

## 2023-09-06 RX ORDER — MEDROXYPROGESTERONE ACETATE 150 MG/ML
50 INJECTION, SUSPENSION INTRAMUSCULAR WEEKLY
Qty: 4 ML | Refills: 5 | Status: SHIPPED | OUTPATIENT
Start: 2023-09-06

## 2023-09-06 RX ORDER — SERTRALINE HYDROCHLORIDE 100 MG/1
100 TABLET, FILM COATED ORAL DAILY
Qty: 90 TABLET | Refills: 3 | Status: SHIPPED | OUTPATIENT
Start: 2023-09-06

## 2023-09-06 RX ORDER — LIRAGLUTIDE 6 MG/ML
3 INJECTION, SOLUTION SUBCUTANEOUS DAILY
Qty: 15 ML | Refills: 3 | Status: SHIPPED | OUTPATIENT
Start: 2023-09-06

## 2023-09-07 NOTE — LETTER
March 18, 2021     Katya King MD  [address] [address]    Patient: Hermelinda Song   YOB: 1960   Date of Visit: 3/18/2021       Dear Dr Lackey Pawnee:    Thank you for referring Hermelinda Song to me for evaluation  Below are my notes for this consultation  If you have questions, please do not hesitate to call me  I look forward to following your patient along with you  Sincerely,        Izzy Lopez MD        CC: No Recipients  Izzy Lopez MD  3/18/2021 11:45 AM  Sign when Signing Visit  Consultation - [facility]  Hermelinda Song 61 y o  female MRN: [id number]          Assessment & Plan:     80-year-old female with history of rheumatoid arthritis, 2 weeks of severe postprandial epigastric pain, history of reflux esophagitis in the past   Due for colon cancer screening  1  Epigastric pain: Most likely secondary to reflux esophagitis versus peptic ulcer disease  -continue daily PPI therapy, refills were provided  - given reflux esophagitis in the past, severity of presentation we will proceed with an upper endoscopy to exclude peptic ulcer disease    2  Colon cancer screening:  Last examination was 9 years ago, she had a small polyp at that time, pathology is not available to us   - we will schedule colonoscopy the same time for colon cancer screening   -patient has longstanding mild constipation, we will make further recommendations after colonoscopy    Discussed with patient risks of procedures including bleeding, surgery, perforation,  Missed polyp detection rate             _____________________________________________________________        CC: Epigastric pain    HPI:  Hermelinda Song is a 61 y  o female who was referred for evaluation of  2 weeks of epigastric pain  As you know this is a pleasant 10year-old female with history of rheumatoid arthritis, she takes methotrexate, Enbrel    Has a history of reflux esophagitis and a small colon polyp on her last examination about 9 years ago in [place]  The pathology is not available to us  She has fairly regular bowel movements, occasionally has constipation with a bowel movement every 2 to 3 days but this has been her baseline  She takes a daily stool softener  Denies any melena, rectal bleeding, abdominal pain  Epigastric pain started 2 weeks ago, she was started on omeprazole, she reports over the past day she has had significant improvement pain  She denies any nausea, vomiting, dysphagia  The symptoms are separate or different for her prior reflux symptoms  No significant NSAID use, she had NSAIDs back in September which she has stopped taking after 1 dose  Family history is negative for GI or associated malignancies  Patient surgical history is noted for bilateral knee surgery, sinus surgery  She denies any tobacco, rarely drinks  Currently works in retail, previously was an educator  The relocated here from [place]  ROS:  The remainder of the ROS was negative except for the pertinent positives mentioned in HPI           Allergies: Plaquenil [hydroxychloroquine]    Medications:   Current Outpatient Medications:     buPROPion (WELLBUTRIN XL) 300 mg 24 hr tablet, TAKE 1 TABLET EVERY MORNING, Disp: 90 tablet, Rfl: 3    Enbrel SureClick 50 MG/ML injection, INJECT 1 PEN UNDER THE SKIN EVERY 7 DAYS , Disp: 4 Syringe, Rfl: 5    EPINEPHrine (ADRENALIN) 0 1 % nasal solution, 0 5 mL into each nostril as needed, Disp: , Rfl:     folic acid (FOLVITE) 1 mg tablet, Take by mouth daily, Disp: , Rfl:     levocetirizine (XYZAL) 5 MG tablet, Take 5 mg by mouth daily as needed for allergies, Disp: , Rfl:     methotrexate 2 5 mg tablet, Take 6 tablets (15 mg total) by mouth once a week, Disp: 72 tablet, Rfl: 1    omeprazole (PriLOSEC) 40 MG capsule, Take 1 capsule (40 mg total) by mouth daily, Disp: 30 capsule, Rfl: 3    sertraline (ZOLOFT) 100 mg tablet, TAKE 1 AND 1/2 TABLETS     (150MG TOTAL) DAILY, Disp: 135 tablet, Rfl: 3    Triamcinolone Acetonide (NASACORT ALLERGY 24HR) 55 MCG/ACT AERO, 2 sprays each nostril daily, Disp: 16 5 g, Rfl: 3    zolpidem (AMBIEN) 10 mg tablet, TAKE 1 TABLET BY MOUTH DAILY AT BEDTIME AS NEEDED FOR SLEEP, Disp: 30 tablet, Rfl: 0    Na Sulfate-K Sulfate-Mg Sulf (Suprep Bowel Prep Kit) 17 5-3 13-1 6 GM/177ML SOLN, Take 1 Bottle by mouth once for 1 dose, Disp: 1 Bottle, Rfl: 0'    Past Medical History:   Diagnosis Date    Allergic rhinitis     Anxiety     Depression     Lumbar spondylosis 9/25/2020    Osteoporosis     Rheumatoid arthritis ([facility] [address] )        Past Surgical History:   Procedure Laterality Date    AUGMENTATION MAMMAPLASTY Bilateral 2010    BREAST BIOPSY Right 2008    benign    KNEE SURGERY      SINUS SURGERY         Family History   Problem Relation Age of Onset    Hypertension Mother     Cancer Mother     Stroke Mother     Crohn's disease Mother     Hyperlipidemia Mother     Heart attack Mother     Breast cancer Mother 79    Hypertension Father     Cancer Father     Stroke Father     Hyperlipidemia Father     Heart attack Father     Hyperlipidemia Sister     Hyperlipidemia Brother         reports that she has never smoked  She has never used smokeless tobacco  She reports current alcohol use of about 2 0 standard drinks of alcohol per week  She reports previous drug use            Physical Exam:     /69 (BP Location: Right arm, Patient Position: Sitting, Cuff Size: Standard)   Pulse 85   Temp 97 9 °F (36 6 °C)   Ht 5' 4 88" (1 648 m)   Wt 68 9 kg (152 lb)   BMI 25 39 kg/m²     Gen: wn/wd, NAD , healthy-appearing female  HEENT: anicteric, MMM, no cervical LAD  CVS: RRR, no m/r/g  CHEST: CTA b/l  ABD: +BS, soft,  Mild epigastric discomfort with deep palpation no hepatosplenomegaly  EXT: no c/c/e  NEURO: aaox3  SKIN: NO rashes Render Risk Assessment In Note?: no

## 2023-09-08 ENCOUNTER — TELEPHONE (OUTPATIENT)
Dept: RHEUMATOLOGY | Facility: CLINIC | Age: 63
End: 2023-09-08

## 2023-09-08 NOTE — TELEPHONE ENCOUNTER
Fosamax has been denied due to patient not having tried Alendronate tablets, ibandronate tables or risedronate tabs

## 2023-09-11 NOTE — TELEPHONE ENCOUNTER
The script in her chart is for alendronate. She may just need to contact the pharmacy and ask them to dispense the generic.

## 2023-09-19 ENCOUNTER — TELEPHONE (OUTPATIENT)
Dept: OBGYN CLINIC | Facility: CLINIC | Age: 63
End: 2023-09-19

## 2023-09-20 ENCOUNTER — TELEPHONE (OUTPATIENT)
Dept: PAIN MEDICINE | Facility: CLINIC | Age: 63
End: 2023-09-20

## 2023-09-20 NOTE — TELEPHONE ENCOUNTER
Left message for the patient to call to reschedule her injection due to the auth still pending with the insurance.   Gave my direct phone number [phone]

## 2023-09-20 NOTE — TELEPHONE ENCOUNTER
----- Message from Almanzar Mauricio, [address] [facility] sent at 9/19/2023  4:20 PM EDT -----  Regarding: Pending Roseanne Weber,     I got a message from [facility] at 4pm letting me know that this patient's auth was still pending. I called the pt but was only able to leave a voicemail. Is it possible to get her on for next week? I also informed [facility] that the schedule finalizes at 2 and we need to know before then. Thanks!   [place]

## 2023-09-20 NOTE — TELEPHONE ENCOUNTER
Left message for the patient to call to reschedule her injection.   Gave my direct phone number [phone]

## 2023-09-20 NOTE — TELEPHONE ENCOUNTER
Caller: Hermelinda COREAS    Doctor/Office: Dr CARTER   Call regarding :  Procedure      Call was transferred to: Stefanie Underwood

## 2023-09-27 ENCOUNTER — TELEPHONE (OUTPATIENT)
Dept: FAMILY MEDICINE CLINIC | Facility: CLINIC | Age: 63
End: 2023-09-27

## 2023-09-27 NOTE — TELEPHONE ENCOUNTER
Patient's prior authorization has been scanned into chart. liraglutide (Saxenda) injection [[id number]]     Order Details  Dose: 3 mg Route: Subcutaneous     Inject 0.5 mL (3 mg total) under the skin Overweight (BMI 25.0-29. 9) [E66.3]daily

## 2023-10-03 ENCOUNTER — TELEPHONE (OUTPATIENT)
Age: 63
End: 2023-10-03

## 2023-10-03 NOTE — TELEPHONE ENCOUNTER
Princess SMILEY Submitted / Approved via "[employer]    Prior authorization approved Case ID: [id number]      Payer:  Lizett Van    [phone]     [phone]    Case [id number]  Coverage Start Date:09/03/2023  Coverage End Date:01/31/2024   Approval Details    Authorized from September 3, 2023 to January 31, 2024

## 2023-10-04 ENCOUNTER — HOSPITAL ENCOUNTER (OUTPATIENT)
Dept: RADIOLOGY | Facility: HOSPITAL | Age: 63
Setting detail: OUTPATIENT SURGERY
Discharge: HOME | End: 2023-10-04
Payer: COMMERCIAL

## 2023-10-04 ENCOUNTER — HOSPITAL ENCOUNTER (OUTPATIENT)
Facility: AMBULARY SURGERY CENTER | Age: 63
Setting detail: OUTPATIENT SURGERY
Discharge: HOME | End: 2023-10-04
Attending: PHYSICAL MEDICINE & REHABILITATION | Admitting: PHYSICAL MEDICINE & REHABILITATION
Payer: COMMERCIAL

## 2023-10-04 VITALS
RESPIRATION RATE: 18 BRPM | SYSTOLIC BLOOD PRESSURE: 126 MMHG | TEMPERATURE: 97.4 F | DIASTOLIC BLOOD PRESSURE: 69 MMHG | OXYGEN SATURATION: 100 % | HEART RATE: 63 BPM

## 2023-10-04 DIAGNOSIS — Z92.241 S/P EPIDURAL STEROID INJECTION: ICD-10-CM

## 2023-10-04 PROCEDURE — 62323 NJX INTERLAMINAR LMBR/SAC: CPT | Performed by: PHYSICAL MEDICINE & REHABILITATION

## 2023-10-04 RX ORDER — LIDOCAINE HYDROCHLORIDE 10 MG/ML
INJECTION, SOLUTION EPIDURAL; INFILTRATION; INTRACAUDAL; PERINEURAL AS NEEDED
Status: DISCONTINUED | OUTPATIENT
Start: 2023-10-04 | End: 2023-10-04 | Stop reason: HOSPADM

## 2023-10-04 RX ORDER — METHYLPREDNISOLONE ACETATE 80 MG/ML
INJECTION, SUSPENSION INTRA-ARTICULAR; INTRALESIONAL; INTRAMUSCULAR; SOFT TISSUE AS NEEDED
Status: DISCONTINUED | OUTPATIENT
Start: 2023-10-04 | End: 2023-10-04 | Stop reason: HOSPADM

## 2023-10-04 NOTE — OP NOTE
OPERATIVE REPORT  PATIENT NAME: Hermelinda Song    :  1960  MRN: [id number]  Pt Location: [facility] MINOR/PAIN ROOM 01    SURGERY DATE: 10/4/2023    Surgeon(s) and Role:     * Penny Sylvester, DO - Primary    Preop Diagnosis:  Lumbar radiculopathy [M54.16]  Lumbar disc herniation [M51.26]  Lumbar spondylosis [M47.816]  Chronic pain syndrome [G89.4]    Post-Op Diagnosis Codes:     * Lumbar radiculopathy [M54.16]     * Lumbar disc herniation [M51.26]     * Lumbar spondylosis [M47.816]     * Chronic pain syndrome [G89.4]    Procedure(s):  L5-S1 LUMBAR epidural steroid injection (35271)  Lumbar epidural  Indication:  Back and radiating leg pain  Preoperative diagnosis:  Lumbar radiculitis  Postoperative diagnosis:  Lumbar radiculitis    Procedure: Fluoroscopically-guided L5-S1 interlaminar epidural steroid injection under fluoroscopy    EBL:  none  Specimens:  not applicable    After discussing the risks, benefits, and alternatives to the procedure, the patient expressed understanding and wished to proceed. The patient was brought to the fluoroscopy suite and placed in the prone position. A procedural pause was conducted to verify:  correct patient identity, procedure to be performed and as applicable, correct side and site, correct patient position, and availability of implants, special equipment and special requirements. After identifying the L5-S1 space fluoroscopically, the skin was sterilely prepped and draped in the usual fashion using Chloraprep skin prep. The skin and subcutaneous tissues were anesthetized with 1% lidocaine. Utilizing a loss of resistance technique and intermittent fluoroscopic guidance, a 3.5 and 20-gauge Tuohy needle was advanced into the epidural space. Proper needle positioning was confirmed using multiple fluoroscopic views. After negative aspiration, Omnipaque 240 contrast was injected confirming epidural spread without evidence of intravascular or intrathecal spread. A 4 ml solution consisting of 80 mg Depo-Medrol in sterile saline was injected slowly and incrementally into the epidural space. Following the injection the needle was withdrawn slightly and flushed with 1% buffered lidocaine as it was fully extracted. The patient tolerated the procedure well and there were no apparent complications. After appropriate observation, the patient was dismissed from the clinic in good condition under their own power.           SIGNATURE: Jayden Bruno, DO  DATE: October 4, 2023  TIME: 12:43 PM

## 2023-10-04 NOTE — H&P
History of Present Illness: The patient is a 61 y.o. female who presents with complaints of low back pain    Past Medical History:   Diagnosis Date   • Allergic rhinitis    • Anxiety    • Colon polyp    • Depression    • Epigastric pain     from hiatal hernia   • Hiatal hernia    • Insomnia    • Iron deficiency anemia    • Lumbar spondylosis 09/25/2020   • Myopia    • Osteoporosis    • PVD (posterior vitreous detachment)    • RA (rheumatoid arthritis) ([address])    • Rheumatoid arthritis ([address])        Past Surgical History:   Procedure Laterality Date   • ABDOMINAL SURGERY      "tummy tuck"   • AUGMENTATION MAMMAPLASTY Bilateral 2010   • BREAST BIOPSY Right 2008    benign   • COLONOSCOPY     • KNEE ARTHROSCOPY Bilateral    • LASIK     • SINUS SURGERY         No current facility-administered medications for this encounter. Allergies   Allergen Reactions   • Plaquenil [Hydroxychloroquine] Rash       Physical Exam:   Vitals:    10/04/23 1230   BP: 120/85   Pulse: 76   Resp: 18   Temp: (!) 97.4 °F (36.3 °C)   SpO2: 100%     General: Awake, Alert, Oriented x 3, Mood and affect appropriate  Respiratory: Respirations even and unlabored  Cardiovascular: Peripheral pulses intact; no edema  Musculoskeletal Exam: Tenderness to palpation bilateral lumbar paraspinals    ASA Score: 2    Patient/Chart Verification  Patient ID Verified: Verbal, Armband  ID Band Applied: Yes  Consents Confirmed: Procedural  H&P( within 30 days) Verified: Yes  Beta Blocker given : N/A  Pre-op Lab/Test Results Available:  In chart  Pregnancy Lab Collected: N/A comment  Does Patient Have a Prosthetic Device/Implant: Yes    Assessment: Lumbar radiculopathy  Plan: L5-S1 LESI

## 2023-10-04 NOTE — DISCHARGE INSTRUCTIONS
Epidural Steroid Injection   WHAT YOU NEED TO KNOW:   An epidural steroid injection (TONYA) is a procedure to inject steroid medicine into the epidural space. The epidural space is between your spinal cord and vertebrae. Steroids reduce inflammation and fluid buildup in your spine that may be causing pain. You may be given pain medicine along with the steroids. ACTIVITY  Do not drive or operate machinery today. No strenuous activity today - bending, lifting, etc.  You may resume normal activites starting tomorrow - start slowly and as tolerated. You may shower today, but no tub baths or hot tubs. You may have numbness for several hours from the local anesthetic. Please use caution and common sense, especially with weight-bearing activities. CARE OF THE INJECTION SITE  If you have soreness or pain, apply ice to the area today (20 minutes on/20 minutes off). Starting tomorrow, you may use warm, moist heat or ice if needed. You may have an increase or change in your discomfort for 36-48 hours after your treatment. Apply ice and continue with any pain medication you have been prescribed. Notify the [facility] if you have any of the following: redness, drainage, swelling, headache, stiff neck or fever above 100°F.    SPECIAL INSTRUCTIONS  Our office will contact you in approximately 7 days for a progress report. MEDICATIONS  Continue to take all routine medications. Our office may have instructed you to hold some medications. As no general anesthesia was used in today's procedure, you should not experience any side effects related to anesthesia. If you are diabetic, the steroids used in today's injection may temporarily increase your blood sugar levels after the first few days after your injection. Please keep a close eye on your sugars and alert the doctor who manages your diabetes if your sugars are significantly high from your baseline or you are symptomatic.      If you have a problem specifically related to your procedure, please call our office at [phone]. Problems not related to your procedure should be directed to your primary care physician.

## 2023-10-11 ENCOUNTER — TELEPHONE (OUTPATIENT)
Dept: RADIOLOGY | Facility: MEDICAL CENTER | Age: 63
End: 2023-10-11

## 2023-12-15 ENCOUNTER — APPOINTMENT (OUTPATIENT)
Dept: RADIOLOGY | Facility: CLINIC | Age: 63
End: 2023-12-15
Payer: COMMERCIAL

## 2023-12-15 ENCOUNTER — PATIENT MESSAGE (OUTPATIENT)
Dept: FAMILY MEDICINE CLINIC | Facility: CLINIC | Age: 63
End: 2023-12-15

## 2023-12-15 ENCOUNTER — OCCMED (OUTPATIENT)
Dept: URGENT CARE | Facility: CLINIC | Age: 63
End: 2023-12-15

## 2023-12-15 DIAGNOSIS — S69.92XA HAND INJURY, LEFT, INITIAL ENCOUNTER: Primary | ICD-10-CM

## 2023-12-15 DIAGNOSIS — S69.92XA HAND INJURY, LEFT, INITIAL ENCOUNTER: ICD-10-CM

## 2023-12-15 PROCEDURE — 73130 X-RAY EXAM OF HAND: CPT

## 2023-12-18 RX ORDER — AMOXICILLIN AND CLAVULANATE POTASSIUM 875; 125 MG/1; MG/1
1 TABLET, FILM COATED ORAL EVERY 12 HOURS SCHEDULED
Qty: 20 TABLET | Refills: 0 | Status: CANCELLED | OUTPATIENT
Start: 2023-12-18 | End: 2023-12-28

## 2023-12-19 ENCOUNTER — OCCMED (OUTPATIENT)
Dept: URGENT CARE | Facility: CLINIC | Age: 63
End: 2023-12-19

## 2023-12-19 DIAGNOSIS — S61.452D HUMAN BITE OF LEFT HAND, SUBSEQUENT ENCOUNTER: Primary | ICD-10-CM
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
## 2023-12-27 ENCOUNTER — OCCMED (OUTPATIENT)
Dept: URGENT CARE | Facility: CLINIC | Age: 63
End: 2023-12-27

## 2023-12-27 DIAGNOSIS — S61.452D HUMAN BITE OF LEFT HAND, SUBSEQUENT ENCOUNTER: ICD-10-CM

## 2023-12-27 DIAGNOSIS — S69.92XA HAND INJURY, LEFT, INITIAL ENCOUNTER: Primary | ICD-10-CM
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
## 2024-01-03 ENCOUNTER — OCCMED (OUTPATIENT)
Dept: URGENT CARE | Facility: CLINIC | Age: 64
End: 2024-01-03
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
## 2024-01-03 DIAGNOSIS — S61.452D HUMAN BITE OF LEFT HAND, SUBSEQUENT ENCOUNTER: Primary | ICD-10-CM

## 2024-01-04 DIAGNOSIS — F51.01 PRIMARY INSOMNIA: ICD-10-CM

## 2024-01-05 DIAGNOSIS — E66.3 OVERWEIGHT (BMI 25.0-29.9): ICD-10-CM

## 2024-01-05 RX ORDER — LIRAGLUTIDE 6 MG/ML
3 INJECTION, SOLUTION SUBCUTANEOUS DAILY
Qty: 15 ML | Refills: 0 | Status: SHIPPED | OUTPATIENT
Start: 2024-01-05

## 2024-01-05 RX ORDER — ZOLPIDEM TARTRATE 10 MG/1
10 TABLET ORAL
Qty: 90 TABLET | Refills: 0 | Status: SHIPPED | OUTPATIENT
Start: 2024-01-05

## 2024-01-05 NOTE — TELEPHONE ENCOUNTER
"Patient sent My Chart message     \"I got an email that the prior authorization for Saxenda was completed. I haven’t gotten it. Could you send in a script to [facility] in [place] for the 3 month prescription or is that something that has to go through my specialty medication pharmacy?  Hermelinda\"  "

## 2024-01-08 ENCOUNTER — PATIENT MESSAGE (OUTPATIENT)
Dept: FAMILY MEDICINE CLINIC | Facility: CLINIC | Age: 64
End: 2024-01-08

## 2024-01-09 ENCOUNTER — OFFICE VISIT (OUTPATIENT)
Dept: FAMILY MEDICINE CLINIC | Facility: CLINIC | Age: 64
End: 2024-01-09
Payer: COMMERCIAL

## 2024-01-09 VITALS
SYSTOLIC BLOOD PRESSURE: 132 MMHG | TEMPERATURE: 96.4 F | HEIGHT: 65 IN | HEART RATE: 76 BPM | DIASTOLIC BLOOD PRESSURE: 81 MMHG | RESPIRATION RATE: 16 BRPM | BODY MASS INDEX: 23.63 KG/M2 | OXYGEN SATURATION: 99 %

## 2024-01-09 DIAGNOSIS — M06.00 SERONEGATIVE RHEUMATOID ARTHRITIS (HCC): ICD-10-CM

## 2024-01-09 DIAGNOSIS — J01.90 ACUTE SINUSITIS, RECURRENCE NOT SPECIFIED, UNSPECIFIED LOCATION: Primary | ICD-10-CM

## 2024-01-09 PROCEDURE — 99213 OFFICE O/P EST LOW 20 MIN: CPT | Performed by: NURSE PRACTITIONER

## 2024-01-09 RX ORDER — AMOXICILLIN AND CLAVULANATE POTASSIUM 875; 125 MG/1; MG/1
1 TABLET, FILM COATED ORAL 2 TIMES DAILY
COMMUNITY
Start: 2023-12-15 | End: 2024-01-09

## 2024-01-09 RX ORDER — OLOPATADINE HYDROCHLORIDE 665 UG/1
SPRAY NASAL
COMMUNITY
Start: 2023-12-22

## 2024-01-09 RX ORDER — AMOXICILLIN AND CLAVULANATE POTASSIUM 875; 125 MG/1; MG/1
1 TABLET, FILM COATED ORAL EVERY 12 HOURS SCHEDULED
Qty: 14 TABLET | Refills: 0 | Status: SHIPPED | OUTPATIENT
Start: 2024-01-09 | End: 2024-01-09 | Stop reason: CLARIF

## 2024-01-09 RX ORDER — DOXYCYCLINE HYCLATE 100 MG/1
100 CAPSULE ORAL EVERY 12 HOURS SCHEDULED
Qty: 14 CAPSULE | Refills: 0 | Status: SHIPPED | OUTPATIENT
Start: 2024-01-09 | End: 2024-01-11

## 2024-01-09 NOTE — PROGRESS NOTES
Name: Hermelinda Song      : 1960      MRN: [id number]  Encounter Provider: COSME Sharma  Encounter Date: 2024   Encounter department: [facility]    Assessment & Plan     1. Acute sinusitis, recurrence not specified, unspecified location  Assessment & Plan:  Symptoms worsening for 3 weeks, was on augmentin mid December after being bitten by a student and symptoms continued to worsen on medication.  Will try doxycycline bid x 7 days, advised to hold vitamins/mineral supplements.  She is concerned about holding these and told her for one week should not cause an issue but she can check with her rheumatologist if she is concerned.      Orders:  -     doxycycline hyclate (VIBRAMYCIN) 100 mg capsule; Take 1 capsule (100 mg total) by mouth every 12 (twelve) hours for 7 days    2. Seronegative rheumatoid arthritis (HCC)           Subjective      Pt is a 63 y.o. y/o female who is seen today for evaluation of sinus pain for 3 weeks.  She says it feels like 2 knives in her forehead and it radiates to her cheeks.  She has been using sinus rinse and pain has worsened.  She had some sweating last night, did not take her temperature.  She occasionally coughs up post-nasal drip, no sob.  Her ears hurt yesterday when she did her saline/budesonide flush and uses olopatadine nasal spray.        Review of Systems   Constitutional:  Negative for appetite change, chills, fatigue and fever.   HENT:  Positive for congestion, postnasal drip, sinus pressure, sinus pain and sneezing. Negative for ear pain and rhinorrhea.    Respiratory:  Positive for cough. Negative for chest tightness and shortness of breath.    Cardiovascular:  Negative for chest pain and palpitations.   Gastrointestinal:  Negative for diarrhea, nausea and vomiting.   Musculoskeletal:  Negative for myalgias.   Neurological:  Positive for headaches. Negative for dizziness.   Hematological:  Negative for adenopathy.  "  Psychiatric/Behavioral:  Negative for sleep disturbance.        Current Outpatient Medications on File Prior to Visit   Medication Sig    alendronate (FOSAMAX) 70 mg tablet Take 1 tablet (70 mg total) by mouth every 7 days    BD Pen Needle Micro U/F 32G X 6 MM MISC USE DAILY AS DIRECTED    budesonide (PULMICORT) 0.5 mg/2 mL nebulizer solution     Calcium Carbonate-Vit D-Min (CALCIUM 1200 PO) Take by mouth every morning    etanercept (Enbrel SureClick) 50 MG/ML injection Inject 1 mL (50 mg total) under the skin once a week    Ferrous Sulfate (IRON PO) Take by mouth every other day Last dose 3/29/21    levocetirizine (XYZAL) 5 MG tablet Take 5 mg by mouth daily as needed for allergies    liraglutide (Saxenda) injection Inject 0.5 mL (3 mg total) under the skin daily    multivitamin (THERAGRAN) TABS Take 1 tablet by mouth daily Last dose 3/29/21    Olopatadine HCl 0.6 % SOLN SPRAY 2 SPRAYS INTO EACH NOSTRIL TWICE A DAY    sertraline (ZOLOFT) 100 mg tablet Take 1 tablet (100 mg total) by mouth daily    VITAMIN D PO Take 10,000 Units by mouth every morning    zolpidem (AMBIEN) 10 mg tablet Take 1 tablet (10 mg total) by mouth daily at bedtime as needed for sleep    [DISCONTINUED] amoxicillin-clavulanate (AUGMENTIN) 875-125 mg per tablet Take 1 tablet by mouth 2 (two) times a day    Triamcinolone Acetonide (NASACORT ALLERGY 24HR) 55 MCG/ACT AERO 2 sprays each nostril daily (Patient not taking: Reported on 8/22/2023)    [DISCONTINUED] al mag oxide-diphenhydramine-lidocaine viscous (MAGIC MOUTHWASH) 1:1:1 suspension Swish and spit 10 mL every 4 (four) hours as needed for mouth pain or discomfort    [DISCONTINUED] omeprazole (PriLOSEC) 40 MG capsule TAKE 1 CAPSULE BY MOUTH EVERY DAY (Patient not taking: Reported on 9/13/2022)       Objective     /81 (BP Location: Right arm)   Pulse 76   Temp (!) 96.4 °F (35.8 °C) (Temporal)   Resp 16   Ht 5' 5\" (1.651 m)   SpO2 99%   BMI 23.63 kg/m²     Physical Exam  Vitals " reviewed.   Constitutional:       General: She is awake. She is not in acute distress.     Appearance: Normal appearance. She is well-developed.   HENT:      Head: Normocephalic.      Right Ear: Hearing, tympanic membrane, ear canal and external ear normal. No middle ear effusion. Tympanic membrane is not bulging.      Left Ear: Hearing, tympanic membrane, ear canal and external ear normal.  No middle ear effusion. Tympanic membrane is not bulging.      Nose: Congestion present. No rhinorrhea.      Comments: Very limited visibility.     Mouth/Throat:      Lips: Pink.      Mouth: Mucous membranes are moist. Mucous membranes are not dry.      Pharynx: Oropharynx is clear. No oropharyngeal exudate or posterior oropharyngeal erythema.      Tonsils: No tonsillar abscesses.   Eyes:      Conjunctiva/sclera: Conjunctivae normal.   Cardiovascular:      Rate and Rhythm: Normal rate and regular rhythm.      Heart sounds: Normal heart sounds. No murmur heard.  Pulmonary:      Effort: Pulmonary effort is normal. No accessory muscle usage or respiratory distress.      Breath sounds: Normal breath sounds. No decreased breath sounds, wheezing, rhonchi or rales.   Lymphadenopathy:      Head:      Right side of head: No submental, submandibular, tonsillar, preauricular, posterior auricular or occipital adenopathy.      Left side of head: No submental, submandibular, tonsillar, preauricular, posterior auricular or occipital adenopathy.      Cervical: No cervical adenopathy.   Skin:     General: Skin is warm and dry.   Neurological:      Mental Status: She is alert and oriented to person, place, and time.   Psychiatric:         Attention and Perception: Attention normal.         Mood and Affect: Mood normal.         Speech: Speech normal.         Behavior: Behavior normal. Behavior is cooperative.         Thought Content: Thought content normal.         Cognition and Memory: Cognition normal.         Judgment: Judgment normal.        COSME Sharma

## 2024-01-09 NOTE — ASSESSMENT & PLAN NOTE
Symptoms worsening for 3 weeks, was on augmentin mid December after being bitten by a student and symptoms continued to worsen on medication.  Will try doxycycline bid x 7 days, advised to hold vitamins/mineral supplements.  She is concerned about holding these and told her for one week should not cause an issue but she can check with her rheumatologist if she is concerned.

## 2024-01-11 ENCOUNTER — OFFICE VISIT (OUTPATIENT)
Dept: FAMILY MEDICINE CLINIC | Facility: CLINIC | Age: 64
End: 2024-01-11
Payer: COMMERCIAL

## 2024-01-11 VITALS
TEMPERATURE: 98.9 F | RESPIRATION RATE: 17 BRPM | BODY MASS INDEX: 26.33 KG/M2 | SYSTOLIC BLOOD PRESSURE: 130 MMHG | DIASTOLIC BLOOD PRESSURE: 80 MMHG | HEART RATE: 76 BPM | WEIGHT: 158 LBS | HEIGHT: 65 IN | OXYGEN SATURATION: 98 %

## 2024-01-11 DIAGNOSIS — Z12.31 VISIT FOR SCREENING MAMMOGRAM: ICD-10-CM

## 2024-01-11 DIAGNOSIS — M81.0 AGE-RELATED OSTEOPOROSIS WITHOUT CURRENT PATHOLOGICAL FRACTURE: ICD-10-CM

## 2024-01-11 DIAGNOSIS — K44.9 HIATAL HERNIA: ICD-10-CM

## 2024-01-11 DIAGNOSIS — F33.42 RECURRENT MAJOR DEPRESSIVE DISORDER, IN FULL REMISSION (HCC): ICD-10-CM

## 2024-01-11 DIAGNOSIS — Z00.00 ANNUAL PHYSICAL EXAM: Primary | ICD-10-CM

## 2024-01-11 DIAGNOSIS — Z23 ENCOUNTER FOR IMMUNIZATION: ICD-10-CM

## 2024-01-11 DIAGNOSIS — J32.0 CHRONIC MAXILLARY SINUSITIS: ICD-10-CM

## 2024-01-11 DIAGNOSIS — F51.01 PRIMARY INSOMNIA: ICD-10-CM

## 2024-01-11 DIAGNOSIS — M06.09 RHEUMATOID ARTHRITIS OF MULTIPLE SITES WITH NEGATIVE RHEUMATOID FACTOR (HCC): ICD-10-CM

## 2024-01-11 DIAGNOSIS — E55.9 VITAMIN D DEFICIENCY: ICD-10-CM

## 2024-01-11 PROBLEM — J01.90 ACUTE SINUSITIS: Status: RESOLVED | Noted: 2024-01-09 | Resolved: 2024-01-11

## 2024-01-11 PROBLEM — M47.816 LUMBAR SPONDYLOSIS: Status: RESOLVED | Noted: 2020-09-25 | Resolved: 2024-01-11

## 2024-01-11 PROCEDURE — 90471 IMMUNIZATION ADMIN: CPT

## 2024-01-11 PROCEDURE — 99214 OFFICE O/P EST MOD 30 MIN: CPT | Performed by: FAMILY MEDICINE

## 2024-01-11 PROCEDURE — 90677 PCV20 VACCINE IM: CPT

## 2024-01-11 PROCEDURE — 99396 PREV VISIT EST AGE 40-64: CPT | Performed by: FAMILY MEDICINE

## 2024-01-11 RX ORDER — CEFUROXIME AXETIL 500 MG/1
500 TABLET ORAL EVERY 12 HOURS SCHEDULED
Qty: 14 TABLET | Refills: 0 | Status: SHIPPED | OUTPATIENT
Start: 2024-01-11 | End: 2024-01-18

## 2024-01-11 RX ORDER — PREDNISONE 10 MG/1
TABLET ORAL
Qty: 20 TABLET | Refills: 0 | Status: SHIPPED | OUTPATIENT
Start: 2024-01-11

## 2024-01-11 NOTE — PROGRESS NOTES
ADULT ANNUAL PHYSICAL  [facility]    NAME: Hermelinda Song  AGE: 63 y.o. SEX: female  : 1960     DATE: 2024     Assessment and Plan:     Problem List Items Addressed This Visit        Digestive    Hiatal hernia     EGD in  was normal   Off of omeprazole   Takes it as needed            Respiratory    Chronic maxillary sinusitis     Ongoing issues since   Had multiple testings including allergy testings, surgeries and CT scan   Last CT scan 5 years ago in [place]  Referral to ENT         Relevant Medications    predniSONE 10 mg tablet    cefuroxime (CEFTIN) 500 mg tablet    Other Relevant Orders    Ambulatory Referral to Otolaryngology       Musculoskeletal and Integument    Rheumatoid arthritis of multiple sites with negative rheumatoid factor (HCC)     Enbrel as directed         Relevant Medications    predniSONE 10 mg tablet    Age-related osteoporosis without current pathological fracture     On fosamax   Due for dexa scan          Relevant Orders    DXA bone density spine hip and pelvis       Other    Primary insomnia     Ambien is helping          Recurrent major depressive disorder, in full remission (HCC)     Stable on zoloft         Other Visit Diagnoses     Annual physical exam    -  Primary    Relevant Orders    CBC and differential    Comprehensive metabolic panel    Lipid panel    TSH, 3rd generation with Free T4 reflex    Vitamin D deficiency        Relevant Orders    Vitamin D 25 hydroxy    Visit for screening mammogram        Relevant Orders    Mammo screening bilateral w 3d & cad          Immunizations and preventive care screenings were discussed with patient today. Appropriate education was printed on patient's after visit summary.    Counseling:  Alcohol/drug use: discussed moderation in alcohol intake, the recommendations for healthy alcohol use, and avoidance of illicit drug use.  Dental Health: discussed  importance of regular tooth brushing, flossing, and dental visits.  Injury prevention: discussed safety/seat belts, safety helmets, smoke detectors, carbon dioxide detectors, and smoking near bedding or upholstery.  Sexual health: discussed sexually transmitted diseases, partner selection, use of condoms, avoidance of unintended pregnancy, and contraceptive alternatives.         No follow-ups on file.     Chief Complaint:     Chief Complaint   Patient presents with   • Physical Exam     Patient being seen for Physical Exam   • Headache     Patient being seen for Headache, runny nose      History of Present Illness:     Adult Annual Physical   Patient here for a comprehensive physical exam. The patient reports problems - sinus issues for the last few weeks .  Doxycycline is not helping     Diet and Physical Activity  Diet/Nutrition: well balanced diet.   Exercise: walking.      Depression Screening  PHQ-2/9 Depression Screening    Little interest or pleasure in doing things: 0 - not at all  Feeling down, depressed, or hopeless: 0 - not at all  Trouble falling or staying asleep, or sleeping too much: 2 - more than half the days  Feeling tired or having little energy: 0 - not at all  Poor appetite or overeatin - not at all  Feeling bad about yourself - or that you are a failure or have let yourself or your family down: 0 - not at all  Trouble concentrating on things, such as reading the newspaper or watching television: 0 - not at all  Moving or speaking so slowly that other people could have noticed. Or the opposite - being so fidgety or restless that you have been moving around a lot more than usual: 0 - not at all  Thoughts that you would be better off dead, or of hurting yourself in some way: 0 - not at all  PHQ-9 Score: 2  PHQ-9 Interpretation: No or Minimal depression       General Health  Sleep: sleeps well.   Hearing: normal - bilateral.  Vision: most recent eye exam <1 year ago.   Dental: regular dental  "visits.       /GYN Health  Follows with gynecology? no   Patient is: postmenopausal  Last menstrual period:   Contraceptive method: menopause.    Advanced Care Planning  Do you have an advanced directive? no  Do you have a durable medical power of ? no     Review of Systems:     Review of Systems   Constitutional: Negative.    HENT: Negative.     Eyes: Negative.    Respiratory: Negative.     Cardiovascular: Negative.    Gastrointestinal: Negative.    Endocrine: Negative.    Genitourinary: Negative.    Musculoskeletal: Negative.    Skin: Negative.    Allergic/Immunologic: Negative.    Neurological: Negative.    Hematological: Negative.    Psychiatric/Behavioral: Negative.        Past Medical History:     Past Medical History:   Diagnosis Date   • Allergic rhinitis    • Anxiety    • Colon polyp    • Depression    • Epigastric pain     from hiatal hernia   • Hiatal hernia    • Insomnia    • Iron deficiency anemia    • Lumbar spondylosis 09/25/2020   • Myopia    • Osteoporosis    • PVD (posterior vitreous detachment)    • RA (rheumatoid arthritis) (HCC)    • Rheumatoid arthritis (HCC)       Past Surgical History:     Past Surgical History:   Procedure Laterality Date   • ABDOMINAL SURGERY      \"tummy tuck\"   • AUGMENTATION MAMMAPLASTY Bilateral 2010   • BREAST BIOPSY Right 2008    benign   • COLONOSCOPY     • KNEE ARTHROSCOPY Bilateral    • LASIK     • LUMBAR EPIDURAL INJECTION N/A 10/4/2023    Procedure: L5-S1 LUMBAR epidural steroid injection (87800);  Surgeon: Efren Davalos DO;  Location: [facility] MAIN OR;  Service: Pain Management    • SINUS SURGERY        Social History:     Social History     Socioeconomic History   • Marital status: /Civil Union     Spouse name: None   • Number of children: None   • Years of education: None   • Highest education level: None   Occupational History   • None   Tobacco Use   • Smoking status: Never   • Smokeless tobacco: Never   Vaping Use   • Vaping status: " Never Used   Substance and Sexual Activity   • Alcohol use: Not Currently     Comment: occ   • Drug use: Not Currently   • Sexual activity: None   Other Topics Concern   • None   Social History Narrative   • None     Social Determinants of Health     Financial Resource Strain: Not on file   Food Insecurity: Not on file   Transportation Needs: Not on file   Physical Activity: Not on file   Stress: Not on file   Social Connections: Not on file   Intimate Partner Violence: Not on file   Housing Stability: Not on file      Family History:     Family History   Problem Relation Age of Onset   • Hypertension Mother    • Cancer Mother         breast   • Stroke Mother    • Crohn's disease Mother    • Hyperlipidemia Mother    • Heart attack Mother    • Breast cancer Mother 70   • Heart disease Mother         MI   • Hypertension Father    • Stroke Father    • Hyperlipidemia Father    • Heart attack Father         ?   • Skin cancer Father    • Hyperlipidemia Brother       Current Medications:     Current Outpatient Medications   Medication Sig Dispense Refill   • alendronate (FOSAMAX) 70 mg tablet Take 1 tablet (70 mg total) by mouth every 7 days 12 tablet 3   • BD Pen Needle Micro U/F 32G X 6 MM MISC USE DAILY AS DIRECTED 100 each 3   • budesonide (PULMICORT) 0.5 mg/2 mL nebulizer solution      • Calcium Carbonate-Vit D-Min (CALCIUM 1200 PO) Take by mouth every morning     • cefuroxime (CEFTIN) 500 mg tablet Take 1 tablet (500 mg total) by mouth every 12 (twelve) hours for 7 days 14 tablet 0   • etanercept (Enbrel SureClick) 50 MG/ML injection Inject 1 mL (50 mg total) under the skin once a week 4 mL 5   • Ferrous Sulfate (IRON PO) Take by mouth every other day Last dose 3/29/21     • levocetirizine (XYZAL) 5 MG tablet Take 5 mg by mouth daily as needed for allergies     • liraglutide (Saxenda) injection Inject 0.5 mL (3 mg total) under the skin daily 15 mL 0   • multivitamin (THERAGRAN) TABS Take 1 tablet by mouth daily Last  "dose 3/29/21     • Olopatadine HCl 0.6 % SOLN SPRAY 2 SPRAYS INTO EACH NOSTRIL TWICE A DAY     • predniSONE 10 mg tablet 4 tabs x 2 days, 3 tabs x 2 days, 2 tabs x 2 days, 1 tab x 2 days 20 tablet 0   • sertraline (ZOLOFT) 100 mg tablet Take 1 tablet (100 mg total) by mouth daily 90 tablet 3   • VITAMIN D PO Take 10,000 Units by mouth every morning     • zolpidem (AMBIEN) 10 mg tablet Take 1 tablet (10 mg total) by mouth daily at bedtime as needed for sleep 90 tablet 0     No current facility-administered medications for this visit.      Allergies:     Allergies   Allergen Reactions   • Plaquenil [Hydroxychloroquine] Rash      Physical Exam:     /80 (BP Location: Left arm, Patient Position: Sitting, Cuff Size: Standard)   Pulse 76   Temp 98.9 °F (37.2 °C) (Tympanic)   Resp 17   Ht 5' 4.8\" (1.646 m)   Wt 71.7 kg (158 lb)   SpO2 98%   BMI 26.45 kg/m²     Physical Exam  Vitals and nursing note reviewed.   Constitutional:       Appearance: Normal appearance. She is well-developed.   HENT:      Head: Normocephalic and atraumatic.      Right Ear: Tympanic membrane normal.      Left Ear: Tympanic membrane normal.      Nose: Nose normal.      Mouth/Throat:      Mouth: Mucous membranes are moist.   Eyes:      Pupils: Pupils are equal, round, and reactive to light.   Cardiovascular:      Rate and Rhythm: Normal rate and regular rhythm.      Pulses: Normal pulses.      Heart sounds: Normal heart sounds.   Pulmonary:      Effort: Pulmonary effort is normal. No respiratory distress.      Breath sounds: Normal breath sounds. No wheezing.   Abdominal:      General: Bowel sounds are normal.      Palpations: Abdomen is soft.   Musculoskeletal:         General: No deformity. Normal range of motion.      Cervical back: Normal range of motion and neck supple.   Skin:     General: Skin is warm.      Capillary Refill: Capillary refill takes less than 2 seconds.   Neurological:      General: No focal deficit present.      " Mental Status: She is alert and oriented to person, place, and time.   Psychiatric:         Mood and Affect: Mood normal.         Behavior: Behavior normal.          Ginny To MD  [facility]

## 2024-01-11 NOTE — ASSESSMENT & PLAN NOTE
Ongoing issues since 1980-90's  Had multiple testings including allergy testings, surgeries and CT scan   Last CT scan 5 years ago in [place]  Referral to ENT

## 2024-01-12 DIAGNOSIS — F51.01 PRIMARY INSOMNIA: ICD-10-CM

## 2024-01-12 RX ORDER — ZOLPIDEM TARTRATE 10 MG/1
10 TABLET ORAL
Qty: 90 TABLET | Refills: 0 | Status: SHIPPED | OUTPATIENT
Start: 2024-01-12 | End: 2024-01-19 | Stop reason: SDUPTHER

## 2024-01-12 NOTE — TELEPHONE ENCOUNTER
Medication:  PDMP Patient not found in [place] PDMP database. If you want to do an interstate query, then select the state(s) you’d like to include in your search and click on 'Open PDMP Report' button  Active agreement on file -No

## 2024-01-15 ENCOUNTER — TELEPHONE (OUTPATIENT)
Age: 64
End: 2024-01-15

## 2024-01-15 NOTE — TELEPHONE ENCOUNTER
Telephone call from patient that she needs a prior authorization initiated to [facility] for her Zolpidem due to her insurance prescription plan changing. They will only cover 45 pills for 68 days, but she needs them every night so they said she needs to have a prior auth done.    Reason for call:   [x] Prior Auth  [] Other:     Caller:  [x] Patient  [] Pharmacy  Name:   Address:   Callback Number:     Medication: Zolpidem     Dose/Frequency: 10 mg tablet once daily at bedtime PRN    Quantity: 90    Ordering Provider:   [x] PCP/Provider - Ginny To   [] Speciality/Provider -     Pharmacy: [facility]     She said a good number to reach them if you need to is [phone].

## 2024-01-15 NOTE — TELEPHONE ENCOUNTER
"PA for Zolpidem    Submitted via  [x]CMM-KEY  [id number] - [place] Case ID: [id number]  []Apollidon-Case ID #    []Faxed to plan   []Other website    []Phone call Case ID #      Office notes sent, clinical questions answered. Awaiting determination     Alternatives:  The drug you are requesting prior authorization for is not covered. Please select an alternative drug from the choices provided and send in a new prescription for that drug. You may also select \"Complete PA\" to obtain a PA for the originally requested drug.   TRAZODONE HCL TABS 50MG   TRAZODONE HCL TABS 100MG   TRAZODONE HCL TABS 150MG   TRAZODONE HCL TABS 300MG   Complete PA          "

## 2024-01-15 NOTE — TELEPHONE ENCOUNTER
Patient called in about medication.  zolpidem (AMBIEN) 10 mg tablet   Dose: 10 mg Route: Oral Frequency: Daily at bedtime PRN for sleep  Dispense Quantity: 90 tablet   Sig: Take 1 tablet (10 mg total) by mouth daily at bedtime as needed for sleep     Route: Oral       E-Prescribing Status: Receipt confirmed by pharmacy (1/12/2024 11:42      Spoke to [facility] there is a plan limitation on patients rx benefits [facility] stated they will contact patient  about prescription or patient can pay out of pocket.     Call completed. Left patient a [employer] message about this medication

## 2024-01-17 NOTE — TELEPHONE ENCOUNTER
PA for Zolpidem (AMBIEN) 10 mg tablet Approved     Date(s) approved 12- to 1-          Patient advised by [x] [employer] Message                      [] Phone call       Pharmacy advised by [x]Fax                                     []Phone call    Approval letter scanned into Media No not available at this time

## 2024-01-19 DIAGNOSIS — F51.01 PRIMARY INSOMNIA: ICD-10-CM

## 2024-01-19 DIAGNOSIS — M81.0 AGE-RELATED OSTEOPOROSIS WITHOUT CURRENT PATHOLOGICAL FRACTURE: ICD-10-CM

## 2024-01-19 RX ORDER — ALENDRONATE SODIUM 70 MG/1
70 TABLET ORAL
Qty: 12 TABLET | Refills: 0 | Status: SHIPPED | OUTPATIENT
Start: 2024-01-19

## 2024-01-19 RX ORDER — ZOLPIDEM TARTRATE 10 MG/1
10 TABLET ORAL
Qty: 90 TABLET | Refills: 0 | Status: SHIPPED | OUTPATIENT
Start: 2024-01-19

## 2024-01-30 LAB
25(OH)D3+25(OH)D2 SERPL-MCNC: 186.8 NG/ML (ref 30–100)
ALBUMIN SERPL-MCNC: 4 G/DL (ref 3.9–4.9)
ALBUMIN/GLOB SERPL: 1.3 {RATIO} (ref 1.2–2.2)
ALP SERPL-CCNC: 75 IU/L (ref 44–121)
ALT SERPL-CCNC: 15 IU/L (ref 0–32)
AST SERPL-CCNC: 23 IU/L (ref 0–40)
BASOPHILS # BLD AUTO: 0 X10E3/UL (ref 0–0.2)
BASOPHILS NFR BLD AUTO: 0 %
BILIRUB SERPL-MCNC: 0.2 MG/DL (ref 0–1.2)
BUN SERPL-MCNC: 17 MG/DL (ref 8–27)
BUN/CREAT SERPL: 18 (ref 12–28)
CALCIUM SERPL-MCNC: 9.1 MG/DL (ref 8.7–10.3)
CHLORIDE SERPL-SCNC: 107 MMOL/L (ref 96–106)
CHOLEST SERPL-MCNC: 218 MG/DL (ref 100–199)
CO2 SERPL-SCNC: 19 MMOL/L (ref 20–29)
CREAT SERPL-MCNC: 0.93 MG/DL (ref 0.57–1)
EGFR: 69 ML/MIN/1.73
EOSINOPHIL # BLD AUTO: 0 X10E3/UL (ref 0–0.4)
EOSINOPHIL NFR BLD AUTO: 0 %
ERYTHROCYTE [DISTWIDTH] IN BLOOD BY AUTOMATED COUNT: 12.2 % (ref 11.7–15.4)
GLOBULIN SER-MCNC: 3 G/DL (ref 1.5–4.5)
GLUCOSE SERPL-MCNC: 84 MG/DL (ref 70–99)
HCT VFR BLD AUTO: 36.4 % (ref 34–46.6)
HDLC SERPL-MCNC: 68 MG/DL
HGB BLD-MCNC: 11.9 G/DL (ref 11.1–15.9)
IMM GRANULOCYTES # BLD: 0 X10E3/UL (ref 0–0.1)
IMM GRANULOCYTES NFR BLD: 1 %
LDLC SERPL CALC-MCNC: 129 MG/DL (ref 0–99)
LYMPHOCYTES # BLD AUTO: 1.8 X10E3/UL (ref 0.7–3.1)
LYMPHOCYTES NFR BLD AUTO: 21 %
MCH RBC QN AUTO: 30.3 PG (ref 26.6–33)
MCHC RBC AUTO-ENTMCNC: 32.7 G/DL (ref 31.5–35.7)
MCV RBC AUTO: 93 FL (ref 79–97)
MONOCYTES # BLD AUTO: 0.7 X10E3/UL (ref 0.1–0.9)
MONOCYTES NFR BLD AUTO: 8 %
NEUTROPHILS # BLD AUTO: 6.3 X10E3/UL (ref 1.4–7)
NEUTROPHILS NFR BLD AUTO: 70 %
PLATELET # BLD AUTO: 309 X10E3/UL (ref 150–450)
POTASSIUM SERPL-SCNC: 4.1 MMOL/L (ref 3.5–5.2)
PROT SERPL-MCNC: 7 G/DL (ref 6–8.5)
RBC # BLD AUTO: 3.93 X10E6/UL (ref 3.77–5.28)
SL AMB VLDL CHOLESTEROL CALC: 21 MG/DL (ref 5–40)
SODIUM SERPL-SCNC: 141 MMOL/L (ref 134–144)
TRIGL SERPL-MCNC: 120 MG/DL (ref 0–149)
TSH SERPL DL<=0.005 MIU/L-ACNC: 1.81 UIU/ML (ref 0.45–4.5)
WBC # BLD AUTO: 8.8 X10E3/UL (ref 3.4–10.8)

## 2024-01-30 PROCEDURE — 87205 SMEAR GRAM STAIN: CPT | Performed by: OTOLARYNGOLOGY

## 2024-01-30 PROCEDURE — 87185 SC STD ENZYME DETCJ PER NZM: CPT | Performed by: OTOLARYNGOLOGY

## 2024-01-30 PROCEDURE — 87070 CULTURE OTHR SPECIMN AEROBIC: CPT | Performed by: OTOLARYNGOLOGY

## 2024-01-30 PROCEDURE — 87075 CULTR BACTERIA EXCEPT BLOOD: CPT | Performed by: OTOLARYNGOLOGY

## 2024-01-30 PROCEDURE — 87184 SC STD DISK METHOD PER PLATE: CPT | Performed by: OTOLARYNGOLOGY

## 2024-01-30 PROCEDURE — 87077 CULTURE AEROBIC IDENTIFY: CPT | Performed by: OTOLARYNGOLOGY

## 2024-02-05 DIAGNOSIS — M06.00 SERONEGATIVE RHEUMATOID ARTHRITIS (HCC): ICD-10-CM

## 2024-02-05 RX ORDER — MEDROXYPROGESTERONE ACETATE 150 MG/ML
50 INJECTION, SUSPENSION INTRAMUSCULAR WEEKLY
Qty: 4 ML | Refills: 5 | Status: SHIPPED | OUTPATIENT
Start: 2024-02-05

## 2024-04-13 DIAGNOSIS — M81.0 AGE-RELATED OSTEOPOROSIS WITHOUT CURRENT PATHOLOGICAL FRACTURE: ICD-10-CM

## 2024-04-13 DIAGNOSIS — F51.01 PRIMARY INSOMNIA: ICD-10-CM

## 2024-04-15 RX ORDER — ZOLPIDEM TARTRATE 10 MG/1
TABLET ORAL
Qty: 90 TABLET | Refills: 0 | Status: SHIPPED | OUTPATIENT
Start: 2024-04-15

## 2024-04-15 RX ORDER — ALENDRONATE SODIUM 70 MG/1
TABLET ORAL
Qty: 12 TABLET | Refills: 3 | Status: SHIPPED | OUTPATIENT
Start: 2024-04-15

## 2024-05-10 DIAGNOSIS — M06.00 SERONEGATIVE RHEUMATOID ARTHRITIS (HCC): ICD-10-CM

## 2024-05-10 RX ORDER — MEDROXYPROGESTERONE ACETATE 150 MG/ML
50 INJECTION, SUSPENSION INTRAMUSCULAR WEEKLY
Qty: 12 ML | Refills: 3 | Status: SHIPPED | OUTPATIENT
Start: 2024-05-10

## 2024-05-15 NOTE — PROGRESS NOTES
Assessment and Plan:   Ms. Song is a 63-year-old female with history significant for seronegative rheumatoid arthritis, osteoporosis and lumbar degenerative arthritis who presents for a follow-up.  She is currently on subcutaneous Enbrel 50 mg weekly.     # Seronegative rheumatoid arthritis  - Hermelinda presents today for a follow-up of seronegative rheumatoid arthritis which has been very stable on subcutaneous Enbrel 50 mg once weekly.  She will continue this unchanged and update high risk medication lab monitoring to assess for drug toxicities.      # Lumbar DJD  - Continue follow up with Pain Management as needed.       # Osteoporosis  - She will continue oral alendronate 70 mg once weekly which was started in 8/2022.  She will also continue daily calcium supplements as well as weight-bearing exercises.  I advised her to hold the vitamin D supplements as she had been taking 10,000 IU once every other day given the significantly elevated vitamin D levels.  A DEXA scan can be updated in 7/2024 and she is already scheduled for this at [facility].      Plan:  Diagnoses and all orders for this visit:    Seronegative rheumatoid arthritis (HCC)  -     C-reactive protein; Future  -     Sedimentation rate, automated; Future    Long-term use of immunosuppressant medication  -     CBC and differential; Future  -     Comprehensive metabolic panel; Future    Primary generalized (osteo)arthritis    Age-related osteoporosis without current pathological fracture    Long term use of bisphosphonates    High serum vitamin D  -     Vitamin D 25 hydroxy; Future      Activities as tolerated.   Exercise: try to maintain a low impact exercise regimen as much as possible. Walk for 30 minutes a day for at least 3 days a week.   Continue other medications as prescribed by PCP and other specialists.       RTC in 1 year.        HPI    INITIAL VISIT NOTE:  Ms. Song is a 58-year-old female with history significant for rheumatoid  arthritis, osteopenia and lumbar degenerative arthritis, who presents for further management of rheumatoid arthritis and establishing with [facility].  She is currently on subcutaneous Enbrel 50 mg weekly and methotrexate 8 tablets weekly.  She is referred by Dr. To.      Patient reports she was diagnosed with rheumatoid arthritis at the age of 30 when she initially presented with left knee pain and swelling.  She states since then it has gradually progressed to involve multiple joints.  She has tried multiple medications including hydroxychloroquine which caused a skin rash, gold injections, sulfasalazine and possibly other injectables although she cannot recall the names.  She has been well maintained on a combination of subcutaneous Enbrel 50 mg weekly and methotrexate 8 tablets weekly with folic acid 1 mg daily for the past 18-20 years.  She has not required to change her medications.  She states overall her joint pains have been well controlled although on occasion she will notice an achiness sensation involving her hands, wrists and elbows.  Intermittently she will notice swelling affecting her hands and feet.  She also experiences morning stiffness primarily involving her hands and feet, and states the feet stiffness resolves right away but the hand stiffness persists for less than 30 minutes.  She is currently not taking any steroids or over-the-counter pain medications.  She refrains from NSAID use due to a history of esophagitis.  She mentions previously she had been on chronic courses of steroids but has not required this in the past few years.  Within the last year she has been dealing with increasing pain affecting her lower back radiating into her bilateral legs associated with a sensation of weakness.  She was seen by Orthopedics in [place] and apparently had imaging done which showed osteoarthritic changes affecting her lumbar spine.  She did complete a course of physical therapy  prior to relocating to this area.  She is not interested in seeing [facility] at this time.  She manages her pain with Tylenol as needed.     Overall she has been tolerating the Enbrel and methotrexate well without any concerns for side effects.  She denies fevers, chills, unintentional weight loss, mouth ulcers, skin rash or GI issues.  No other complaints noted at this time.        9/9/2019:  Patient presents for a follow-up today.  She is currently on subcutaneous Enbrel 50 mg weekly and oral methotrexate 8 tablets weekly with folic acid 1 mg daily.     We reviewed her labs done following the prior office visit which revealed an unremarkable CBC, CMP, ESR, CRP, chronic hepatitis panel, QuantiFERON TB gold, vitamin-D, rheumatoid factor and anti CCP antibody.  X-rays of her bilateral hands and feet did not show any inflammatory changes, but showed very minimal degenerative changes.     She reports overall being stable on her current regimen of the Enbrel and methotrexate.  She has not had any flare-ups of joint pain or swelling.  Only on occasion will she experience mild pain around her hips and knees.  She does experience morning stiffness which affects her diffusely, but lasts only a few minutes.  She has not had to take any over-the-counter pain medications for her joint pains.  She has been tolerating both medications well without any concerns for side effects or recurrent infections.  When she does experience an achy sensation in her bilateral legs she does take Tylenol if needed.     She is unsure when her last bone density exam was, but thinks it may have been more than 2 years ago.  She does take calcium and vitamin-D supplements daily for a history of osteopenia.     She denies any other complaints at this time.        1/10/2020:  Patient presents for follow-up of seronegative rheumatoid arthritis.  She is currently on oral methotrexate at 7 tablets once weekly with folic acid 1 mg  daily, and Enbrel 50 mg once weekly.  We reviewed her labs done following the prior office visit which showed an unremarkable CBC and CMP.     She reports overall with decreasing her methotrexate from 8-7 tablets weekly there was no flare-up noted in her joint pains.  She is currently denying any joint pain or swelling.  She experiences 1 minutes of morning stiffness affecting her hands and feet.  She has not had to take any over-the-counter pain medications.  She has overall been tolerating the methotrexate and Enbrel well without any concerns for side effects or infections.     She is yet to schedule the DEXA scan.  She does take calcium and vitamin-D supplements daily.        5/18/2020:  Patient presents for a follow-up of seronegative rheumatoid arthritis.  She is currently on oral methotrexate at 6 tablets once weekly with folic acid 1 mg daily, and Enbrel 50 mg once weekly.  She is due to get her high risk medication lab monitoring done.  We reviewed her DEXA scan done in February 2020 which showed osteopenia, but her 10 year risk of major osteoporotic fracture as calculated by the FRAX score was slightly elevated at 23.8%.     In terms of the rheumatoid arthritis, she reports that she is overall doing well at this time and denies any significant concerns for joint flare-ups including pain, swelling or stiffness.  She is tolerating her regimen well without any concerns for side effects or infections.  Over the past 2 months she has noticed left elbow pain which was initially sharp, but now she describes it as a dull constant pain.  She states that activities such as lifting heavy items will worsen the pain and she feels slightly decreased strength in that arm.  She does take Tylenol as needed which helps.     For the osteopenia history, she does take daily calcium supplements but not vitamin-D.  She denies a history of fragility fractures.        9/21/2020:  Patient presents for a follow-up of seronegative  rheumatoid arthritis.  She is currently on oral methotrexate at 6 tablets once weekly with folic acid 1 mg daily, and Enbrel 50 mg once weekly.  I reviewed her labs from July which were normal.     She reports in terms of the rheumatoid arthritis she has been doing very well and denies any joint pains or swelling.  She does experience morning stiffness of her hands which takes a few minutes to improve.  She has been tolerating the methotrexate and Enbrel well without any concerns for side effects or infections.     Over the past few months she has been dealing with low back pain with radiation down her right leg for which she contacted me.  She was referred to Orthopedics and had an MRI of her lumbar spine done which showed multilevel disc herniations and bulges.  She is going to be seeing pain management on Friday to discuss interventional procedures.     For the osteopenia history she is on daily calcium and vitamin-D supplements.  She denies a history of fragility fractures.        3/22/2021:  Patient presents for a follow-up of seronegative rheumatoid arthritis.  She is currently on oral methotrexate at 5-6 tablets once weekly with folic acid 1 mg daily, and Enbrel 50 mg once weekly.  I reviewed her labs from December which were normal.     She reports overall from a rheumatoid arthritis perspective she has been doing well.  She did have an episode of pain affecting her left elbow which resolved, but it may still be sensitive to light touch.  No other flare-ups of joint pains or swelling.  She does experience morning stiffness which mostly affects her hands and resolves within a few minutes.  She has not been taking any over-the-counter NSAIDs.  She has tolerated the methotrexate and Enbrel well without any concerns for side effects or infections.     She did follow with Orthopedics for the low back pain with right-sided radicular symptoms for which she was seen by pain management and had a steroid injection  done which provided her with good relief.  She reports similar symptoms now occurring down her left leg.     For the osteopenia history she is on daily calcium and vitamin-D supplements.  She denies a history of fragility fractures.        9/9/2021:  Patient presents for a follow-up of seronegative rheumatoid arthritis.  She is currently on oral methotrexate at 5 tablets once weekly with folic acid 1 mg daily, and Enbrel 50 mg once weekly.  She has not had recent high risk medication lab monitoring done.      She reports overall from a rheumatoid arthritis perspective she has been stable.  She did try to wean off the methotrexate and states that when she discontinued it she noticed worsening stiffness and swelling in her hands.  In view of this she did restart the methotrexate at 5 tablets once weekly and states that the stiffness has improved as well as the swelling but she is still feeling like her knuckles (PIPs) are big.  Otherwise no significant peripheral joint pains or swelling.  She does experience morning stiffness in her ankles and feet that takes less than an hour to improve.  She is not taking any over-the-counter pain medications.  She has tolerated the methotrexate and Enbrel well without any concerns for side effects or infections.     For the osteopenia history she is on daily calcium and vitamin-D supplements.  She denies a history of fragility fractures.      5/12/2022:  Patient presents for a follow-up of seronegative rheumatoid arthritis.  She is currently on oral methotrexate at 4 tablets once weekly with folic acid 1 mg daily and Enbrel 50 mg once weekly.  I reviewed her CBC and CMP done in March which were unremarkable.      She reports overall from a rheumatoid arthritis perspective she has been stable and not had any flare-ups in joint pains, swelling or stiffness.  She is still dealing with the low back and left hip region pain but did not respond to injections administered by [facility].  Gabapentin was also ineffective so she discontinued it.  She will follow-up with them as needed.       She reports over the past month she has noticed intermittent pain at the base of her right thumb.  She has been using Voltaren gel twice a day which does help her.    For the osteopenia history she is on daily calcium and vitamin-D supplements.  She denies a history of fragility fractures.      8/3/2022:  Patient presents for an acute visit today due to right base of thumb pain she has been experiencing.  She would like an injection.    We did also review her recent DEXA scan which shows osteoporosis based on the lumbar spine with a 3% decrease in the bone mineral density of the lumbar spine and a 4% decrease in the total bone mineral density of the bilateral hips.  Her 10 year risk of hip fracture is 3% with a 10 year risk of major osteoporotic fracture of 25%.  In view of this we discussed starting her on weekly alendronate and she did so last week.      11/14/2022:  Patient presents for a follow-up of seronegative rheumatoid arthritis.  She is currently on subcutaneous Enbrel 50 mg once weekly.  I reviewed her recent labs done in October which showed an unremarkable CBC, CMP, ESR and CRP.      She reports she has overall been doing well without any flare-ups in joint pain, swelling or stiffness.  Since the last office visit she gradually tapered down on the methotrexate and has been off the medication for the past 1 and half months.  She has not noticed any change in her joint pains with doing so.  She reports the right CMC intra-articular cortisone injection she received in August significantly helped her.      5/15/2023:  Patient presents for a follow-up of seronegative rheumatoid arthritis.  She is currently on subcutaneous Enbrel 50 mg once weekly.     She reports she has overall been doing well without any flare-ups in joint pain, swelling or stiffness, except for intermittent pain she is  again experiencing at her right CMC joint.  She has been tolerating the Enbrel well without any concerns for side effects or infections.    She is also taking alendronate 70 mg once weekly for osteoporosis.  No interim fractures.      5/16/2024:  Patient presents for a follow-up of seronegative rheumatoid arthritis.  She is currently on subcutaneous Enbrel 50 mg once weekly.     She reports she has overall been doing well without any flare-ups in joint pain, swelling or stiffness.  She has been tolerating the Enbrel well without any concerns for side effects or infections.    She is also taking alendronate 70 mg once weekly for osteoporosis.  No interim fractures.    The following portions of the patient's history were reviewed and updated as appropriate: allergies, current medications, past family history, past medical history, past social history, past surgical history and problem list.      Review of Systems  Constitutional: Negative for weight change, fevers, chills, night sweats, fatigue.  ENT/Mouth: Negative for hearing changes, ear pain, nasal congestion, sinus pain, hoarseness, sore throat, rhinorrhea, swallowing difficulty.   Eyes: Negative for pain, redness, discharge, vision changes.   Cardiovascular: Negative for chest pain, SOB, palpitations.   Respiratory: Negative for cough, sputum, wheezing, dyspnea.   Gastrointestinal: Negative for nausea, vomiting, diarrhea, constipation, pain, heartburn.  Genitourinary: Negative for dysuria, urinary frequency, hematuria.   Musculoskeletal: As per HPI.  Skin: Negative for skin rash, color changes.   Neuro: Negative for weakness, numbness, tingling, loss of consciousness.   Psych: Negative for anxiety, depression.   Heme/Lymph: Negative for easy bruising, bleeding, lymphadenopathy.        Past Medical History:   Diagnosis Date    Allergic rhinitis     Anxiety     Colon polyp     Depression     Epigastric pain     from hiatal hernia    Hiatal hernia     Insomnia      "Iron deficiency anemia     Lumbar spondylosis 09/25/2020    Myopia     Osteoporosis     PVD (posterior vitreous detachment)     RA (rheumatoid arthritis) (HCC)     Rheumatoid arthritis (HCC)     Sinusitis        Past Surgical History:   Procedure Laterality Date    ABDOMINAL SURGERY      \"tummy tuck\"    AUGMENTATION MAMMAPLASTY Bilateral 2010    BREAST BIOPSY Right 2008    benign    COLONOSCOPY      KNEE ARTHROSCOPY Bilateral     LASIK      LUMBAR EPIDURAL INJECTION N/A 10/4/2023    Procedure: L5-S1 LUMBAR epidural steroid injection (99084);  Surgeon: Efren Davalos DO;  Location: [facility] MAIN OR;  Service: Pain Management     SINUS SURGERY         Social History     Socioeconomic History    Marital status: /Civil Union     Spouse name: Not on file    Number of children: Not on file    Years of education: Not on file    Highest education level: Not on file   Occupational History    Not on file   Tobacco Use    Smoking status: Never    Smokeless tobacco: Never   Vaping Use    Vaping status: Never Used   Substance and Sexual Activity    Alcohol use: Not Currently     Comment: occ    Drug use: Not Currently    Sexual activity: Not on file   Other Topics Concern    Not on file   Social History Narrative    Not on file     Social Determinants of Health     Financial Resource Strain: Not on file   Food Insecurity: Not on file   Transportation Needs: Not on file   Physical Activity: Not on file   Stress: Not on file   Social Connections: Not on file   Intimate Partner Violence: Not on file   Housing Stability: Not on file       Family History   Problem Relation Age of Onset    Hypertension Mother     Cancer Mother         breast    Stroke Mother     Crohn's disease Mother     Hyperlipidemia Mother     Heart attack Mother     Breast cancer Mother 70    Heart disease Mother         MI    Hypertension Father     Stroke Father     Hyperlipidemia Father     Heart attack Father         ?    Skin cancer Father     " "Hyperlipidemia Brother        Allergies   Allergen Reactions    Plaquenil [Hydroxychloroquine] Rash       Current Outpatient Medications:     alendronate (FOSAMAX) 70 mg tablet, TAKE 1 TABLET EVERY 7 DAYS, Disp: 12 tablet, Rfl: 3    budesonide (PULMICORT) 0.5 mg/2 mL nebulizer solution, , Disp: , Rfl:     Calcium Carbonate-Vit D-Min (CALCIUM 1200 PO), Take by mouth every morning, Disp: , Rfl:     etanercept (Enbrel SureClick) 50 MG/ML injection, Inject 1 mL (50 mg total) under the skin once a week, Disp: 12 mL, Rfl: 3    Ferrous Sulfate (IRON PO), Take by mouth every other day Last dose 3/29/21, Disp: , Rfl:     fluticasone (FLONASE) 50 mcg/act nasal spray, 2 sprays into each nostril daily, Disp: 16 g, Rfl: 5    levocetirizine (XYZAL) 5 MG tablet, Take 5 mg by mouth daily as needed for allergies, Disp: , Rfl:     liraglutide (Saxenda) injection, Inject 0.5 mL (3 mg total) under the skin daily, Disp: 15 mL, Rfl: 0    mometasone (NASONEX) 50 mcg/act nasal spray, 2 sprays into each nostril daily, Disp: 51 g, Rfl: 5    multivitamin (THERAGRAN) TABS, Take 1 tablet by mouth daily Last dose 3/29/21, Disp: , Rfl:     Olopatadine HCl 0.6 % SOLN, SPRAY 2 SPRAYS INTO EACH NOSTRIL TWICE A DAY, Disp: , Rfl:     sertraline (ZOLOFT) 100 mg tablet, Take 1 tablet (100 mg total) by mouth daily, Disp: 90 tablet, Rfl: 3    zolpidem (AMBIEN) 10 mg tablet, TAKE 1 TABLET DAILY AT BEDTIME AS NEEDED FOR SLEEP, Disp: 90 tablet, Rfl: 0    BD Pen Needle Micro U/F 32G X 6 MM MISC, USE DAILY AS DIRECTED (Patient not taking: Reported on 1/30/2024), Disp: 100 each, Rfl: 3      Objective:    Vitals:    05/16/24 1524   BP: 118/74   Height: 5' 4.8\" (1.646 m)       Physical Exam  General: Well appearing, well nourished, in no distress. Oriented x 3, normal mood and affect.  Ambulating without difficulty.  Skin: Good turgor, no rash, unusual bruising or prominent lesions.  Hair: Normal texture and distribution.  Nails: Normal color, no " deformities.  HEENT:  Head: Normocephalic, atraumatic.  Eyes: Conjunctiva clear, sclera non-icteric, EOM intact.  Extremities: No amputations or deformities, cyanosis, edema.  Musculoskeletal: Right CMC squaring noted.  Neurologic: Alert and oriented. No focal neurological deficits appreciated.   Psychiatric: Normal mood and affect.       Mariam Mo M.D.  Rheumatology

## 2024-05-16 ENCOUNTER — OFFICE VISIT (OUTPATIENT)
Dept: RHEUMATOLOGY | Facility: CLINIC | Age: 64
End: 2024-05-16
Payer: COMMERCIAL

## 2024-05-16 VITALS — SYSTOLIC BLOOD PRESSURE: 118 MMHG | DIASTOLIC BLOOD PRESSURE: 74 MMHG | BODY MASS INDEX: 26.46 KG/M2 | HEIGHT: 65 IN

## 2024-05-16 DIAGNOSIS — M06.00 SERONEGATIVE RHEUMATOID ARTHRITIS (HCC): Primary | ICD-10-CM

## 2024-05-16 DIAGNOSIS — R79.89 HIGH SERUM VITAMIN D: ICD-10-CM

## 2024-05-16 DIAGNOSIS — Z79.60 LONG-TERM USE OF IMMUNOSUPPRESSANT MEDICATION: ICD-10-CM

## 2024-05-16 DIAGNOSIS — M81.0 AGE-RELATED OSTEOPOROSIS WITHOUT CURRENT PATHOLOGICAL FRACTURE: ICD-10-CM

## 2024-05-16 DIAGNOSIS — Z79.83 LONG TERM USE OF BISPHOSPHONATES: ICD-10-CM

## 2024-05-16 DIAGNOSIS — M15.0 PRIMARY GENERALIZED (OSTEO)ARTHRITIS: ICD-10-CM

## 2024-05-16 PROCEDURE — 99214 OFFICE O/P EST MOD 30 MIN: CPT | Performed by: INTERNAL MEDICINE

## 2024-07-03 LAB
25(OH)D3+25(OH)D2 SERPL-MCNC: 130 NG/ML (ref 30–100)
ALBUMIN SERPL-MCNC: 4.1 G/DL (ref 3.9–4.9)
ALP SERPL-CCNC: 74 IU/L (ref 44–121)
ALT SERPL-CCNC: 17 IU/L (ref 0–32)
AST SERPL-CCNC: 25 IU/L (ref 0–40)
BASOPHILS # BLD AUTO: 0 X10E3/UL (ref 0–0.2)
BASOPHILS NFR BLD AUTO: 0 %
BILIRUB SERPL-MCNC: 0.3 MG/DL (ref 0–1.2)
BUN SERPL-MCNC: 22 MG/DL (ref 8–27)
BUN/CREAT SERPL: 29 (ref 12–28)
CALCIUM SERPL-MCNC: 9.8 MG/DL (ref 8.7–10.3)
CHLORIDE SERPL-SCNC: 106 MMOL/L (ref 96–106)
CO2 SERPL-SCNC: 21 MMOL/L (ref 20–29)
CREAT SERPL-MCNC: 0.75 MG/DL (ref 0.57–1)
CRP SERPL-MCNC: <1 MG/L (ref 0–10)
EGFR: 89 ML/MIN/1.73
EOSINOPHIL # BLD AUTO: 0 X10E3/UL (ref 0–0.4)
EOSINOPHIL NFR BLD AUTO: 0 %
ERYTHROCYTE [DISTWIDTH] IN BLOOD BY AUTOMATED COUNT: 12.2 % (ref 11.7–15.4)
ERYTHROCYTE [SEDIMENTATION RATE] IN BLOOD BY WESTERGREN METHOD: 2 MM/HR (ref 0–40)
GLOBULIN SER-MCNC: 2.3 G/DL (ref 1.5–4.5)
GLUCOSE SERPL-MCNC: 118 MG/DL (ref 70–99)
HCT VFR BLD AUTO: 37.4 % (ref 34–46.6)
HGB BLD-MCNC: 12 G/DL (ref 11.1–15.9)
IMM GRANULOCYTES # BLD: 0 X10E3/UL (ref 0–0.1)
IMM GRANULOCYTES NFR BLD: 0 %
LYMPHOCYTES # BLD AUTO: 2.2 X10E3/UL (ref 0.7–3.1)
LYMPHOCYTES NFR BLD AUTO: 34 %
MCH RBC QN AUTO: 31.4 PG (ref 26.6–33)
MCHC RBC AUTO-ENTMCNC: 32.1 G/DL (ref 31.5–35.7)
MCV RBC AUTO: 98 FL (ref 79–97)
MONOCYTES # BLD AUTO: 0.5 X10E3/UL (ref 0.1–0.9)
MONOCYTES NFR BLD AUTO: 7 %
NEUTROPHILS # BLD AUTO: 3.7 X10E3/UL (ref 1.4–7)
NEUTROPHILS NFR BLD AUTO: 59 %
PLATELET # BLD AUTO: 202 X10E3/UL (ref 150–450)
POTASSIUM SERPL-SCNC: 4.1 MMOL/L (ref 3.5–5.2)
PROT SERPL-MCNC: 6.4 G/DL (ref 6–8.5)
RBC # BLD AUTO: 3.82 X10E6/UL (ref 3.77–5.28)
SODIUM SERPL-SCNC: 140 MMOL/L (ref 134–144)
WBC # BLD AUTO: 6.5 X10E3/UL (ref 3.4–10.8)

## 2024-07-05 ENCOUNTER — TELEPHONE (OUTPATIENT)
Dept: RHEUMATOLOGY | Facility: CLINIC | Age: 64
End: 2024-07-05

## 2024-07-05 NOTE — TELEPHONE ENCOUNTER
----- Message from Mariam Mo MD sent at 7/3/2024 10:43 AM EDT -----  Is she still taking Vitamin D? Level is still high.

## 2024-07-09 DIAGNOSIS — M81.0 AGE-RELATED OSTEOPOROSIS WITHOUT CURRENT PATHOLOGICAL FRACTURE: ICD-10-CM

## 2024-07-10 ENCOUNTER — PATIENT MESSAGE (OUTPATIENT)
Dept: FAMILY MEDICINE CLINIC | Facility: CLINIC | Age: 64
End: 2024-07-10

## 2024-07-10 DIAGNOSIS — Z00.6 ENCOUNTER FOR EXAMINATION FOR NORMAL COMPARISON OR CONTROL IN CLINICAL RESEARCH PROGRAM: ICD-10-CM

## 2024-07-11 ENCOUNTER — APPOINTMENT (OUTPATIENT)
Dept: LAB | Facility: CLINIC | Age: 64
End: 2024-07-11

## 2024-07-11 DIAGNOSIS — Z00.6 ENCOUNTER FOR EXAMINATION FOR NORMAL COMPARISON OR CONTROL IN CLINICAL RESEARCH PROGRAM: ICD-10-CM

## 2024-07-11 PROCEDURE — 36415 COLL VENOUS BLD VENIPUNCTURE: CPT

## 2024-07-18 ENCOUNTER — RA CDI HCC (OUTPATIENT)
Dept: OTHER | Facility: HOSPITAL | Age: 64
End: 2024-07-18

## 2024-07-19 DIAGNOSIS — F51.01 PRIMARY INSOMNIA: ICD-10-CM

## 2024-07-19 RX ORDER — ZOLPIDEM TARTRATE 10 MG/1
TABLET ORAL
Qty: 90 TABLET | Refills: 0 | Status: SHIPPED | OUTPATIENT
Start: 2024-07-19

## 2024-07-24 ENCOUNTER — OFFICE VISIT (OUTPATIENT)
Dept: FAMILY MEDICINE CLINIC | Facility: CLINIC | Age: 64
End: 2024-07-24
Payer: COMMERCIAL

## 2024-07-24 VITALS
OXYGEN SATURATION: 100 % | SYSTOLIC BLOOD PRESSURE: 130 MMHG | BODY MASS INDEX: 26.42 KG/M2 | TEMPERATURE: 99.4 F | HEIGHT: 65 IN | DIASTOLIC BLOOD PRESSURE: 86 MMHG | HEART RATE: 68 BPM | WEIGHT: 158.6 LBS | RESPIRATION RATE: 16 BRPM

## 2024-07-24 DIAGNOSIS — E66.3 OVERWEIGHT (BMI 25.0-29.9): ICD-10-CM

## 2024-07-24 DIAGNOSIS — M06.09 RHEUMATOID ARTHRITIS OF MULTIPLE SITES WITH NEGATIVE RHEUMATOID FACTOR (HCC): ICD-10-CM

## 2024-07-24 DIAGNOSIS — F33.42 RECURRENT MAJOR DEPRESSIVE DISORDER, IN FULL REMISSION (HCC): ICD-10-CM

## 2024-07-24 DIAGNOSIS — M81.0 AGE-RELATED OSTEOPOROSIS WITHOUT CURRENT PATHOLOGICAL FRACTURE: ICD-10-CM

## 2024-07-24 DIAGNOSIS — M54.16 LUMBAR RADICULOPATHY: Primary | ICD-10-CM

## 2024-07-24 PROCEDURE — 99214 OFFICE O/P EST MOD 30 MIN: CPT | Performed by: FAMILY MEDICINE

## 2024-07-24 NOTE — ASSESSMENT & PLAN NOTE
To track calories on myfitness pal  To increase protein in her diet  Trial of wegovy but she was informed that it might not be covered due to BMI < 30

## 2024-07-24 NOTE — ASSESSMENT & PLAN NOTE
Continued to have low back pain and numbness   Epidural injection in 10/2024 took the edge off  Seeing pain management

## 2024-07-24 NOTE — PROGRESS NOTES
Ambulatory Visit  Name: Hermelinda Song      : 1960      MRN: [id number]  Encounter Provider: Ginny To MD  Encounter Date: 2024   Enc[facility] department: [facility]    Assessment & Plan   1. Lumbar radiculopathy  Assessment & Plan:  Continued to have low back pain and numbness   Epidural injection in 10/2024 took the edge off  Seeing pain management  Orders:  -     Semaglutide-Weight Management (WEGOVY) 0.25 MG/0.5ML; Inject 0.5 mL (0.25 mg total) under the skin once a week for 28 days  2. Recurrent major depressive disorder, in full remission (HCC)  Assessment & Plan:  Stable on zoloft   3. Age-related osteoporosis without current pathological fracture  Assessment & Plan:  On fosamax  4. Rheumatoid arthritis of multiple sites with negative rheumatoid factor (HCC)  Assessment & Plan:  Stable on enbrel  5. Overweight (BMI 25.0-29.9)  Assessment & Plan:  To track calories on [email or web address] pal  To increase protein in her diet  Trial of wegovy but she was informed that it might not be covered due to BMI < 30  Orders:  -     Semaglutide-Weight Management (WEGOVY) 0.25 MG/0.5ML; Inject 0.5 mL (0.25 mg total) under the skin once a week for 28 days         History of Present Illness     HPI  Here for follow up  Concerns about weight  Highest weight: 237 lbs  Lowest weight: 142 lbs ( 2022)   Not counting her calories     Review of Systems   Constitutional: Negative.    HENT: Negative.     Respiratory: Negative.     Musculoskeletal:  Positive for back pain. Negative for gait problem, joint swelling and myalgias.   Hematological: Negative.    Psychiatric/Behavioral: Negative.       Past Medical History:   Diagnosis Date   • Allergic rhinitis    • Anxiety    • Arthritis    • Colon polyp    • Depression    • Epigastric pain     from hiatal hernia   • Hiatal hernia    • Insomnia    • Iron deficiency anemia    • Lumbar spondylosis 2020   • Myopia    • Osteoporosis    • PVD (posterior  "vitreous detachment)    • RA (rheumatoid arthritis) (HCC)    • Rheumatoid arthritis (HCC)    • Sinusitis      Past Surgical History:   Procedure Laterality Date   • ABDOMINAL SURGERY      \"tummy tuck\"   • AUGMENTATION MAMMAPLASTY Bilateral 2010   • BREAST BIOPSY Right 2008    benign   • COLONOSCOPY     • KNEE ARTHROSCOPY Bilateral    • LASIK     • LUMBAR EPIDURAL INJECTION N/A 10/4/2023    Procedure: L5-S1 LUMBAR epidural steroid injection (64425);  Surgeon: Efren Davalos DO;  Location: [facility] MAIN OR;  Service: Pain Management    • SINUS SURGERY       Family History   Problem Relation Age of Onset   • Hypertension Mother    • Cancer Mother         breast   • Stroke Mother    • Crohn's disease Mother    • Hyperlipidemia Mother    • Heart attack Mother    • Breast cancer Mother 70   • Heart disease Mother         MI   • Hypertension Father    • Stroke Father    • Hyperlipidemia Father    • Heart attack Father         ?   • Skin cancer Father    • Cancer Father    • Hyperlipidemia Brother      Social History     Tobacco Use   • Smoking status: Never     Passive exposure: Never   • Smokeless tobacco: Never   Vaping Use   • Vaping status: Never Used   Substance and Sexual Activity   • Alcohol use: Yes     Alcohol/week: 2.0 standard drinks of alcohol     Types: 2 Glasses of wine per week     Comment: occ   • Drug use: Not Currently   • Sexual activity: Not on file     Current Outpatient Medications on File Prior to Visit   Medication Sig   • alendronate (FOSAMAX) 70 mg tablet TAKE 1 TABLET EVERY 7 DAYS   • Calcium Carbonate-Vit D-Min (CALCIUM 1200 PO) Take by mouth every morning   • etanercept (Enbrel SureClick) 50 MG/ML injection Inject 1 mL (50 mg total) under the skin once a week   • Ferrous Sulfate (IRON PO) Take by mouth every other day Last dose 3/29/21   • fluticasone (FLONASE) 50 mcg/act nasal spray 2 sprays into each nostril daily   • levocetirizine (XYZAL) 5 MG tablet Take 5 mg by mouth daily as needed " "for allergies   • mometasone (NASONEX) 50 mcg/act nasal spray 2 sprays into each nostril daily   • multivitamin (THERAGRAN) TABS Take 1 tablet by mouth daily Last dose 3/29/21   • Olopatadine HCl 0.6 % SOLN SPRAY 2 SPRAYS INTO EACH NOSTRIL TWICE A DAY   • sertraline (ZOLOFT) 100 mg tablet Take 1 tablet (100 mg total) by mouth daily   • zolpidem (AMBIEN) 10 mg tablet TAKE 1 TABLET DAILY AT BEDTIME AS NEEDED FOR SLEEP     Allergies   Allergen Reactions   • Plaquenil [Hydroxychloroquine] Rash     Immunization History   Administered Date(s) Administered   • COVID-19 PFIZER VACCINE 0.3 ML IM 03/26/2021, 04/16/2021, 11/13/2021, 10/26/2023   • INFLUENZA 10/01/2015, 09/25/2016, 11/06/2017, 09/01/2018, 10/08/2018, 09/15/2020, 09/13/2022, 10/26/2023   • Influenza, recombinant, quadrivalent,injectable, preservative free 09/18/2019, 09/27/2021, 09/13/2022   • Pneumococcal Conjugate Vaccine 20-valent (Pcv20), Polysace 01/11/2024   • Tdap 09/18/2019   • Tuberculin Skin Test-PPD Intradermal 03/15/2023   • Zoster Vaccine Recombinant 03/10/2021, 09/27/2021     Objective     /86 (BP Location: Left arm, Patient Position: Sitting, Cuff Size: Standard)   Pulse 68   Temp 99.4 °F (37.4 °C) (Tympanic)   Resp 16   Ht 5' 4.69\" (1.643 m)   Wt 71.9 kg (158 lb 9.6 oz)   SpO2 100%   BMI 26.65 kg/m²     Physical Exam  Nursing note reviewed.   Constitutional:       Appearance: Normal appearance.   Cardiovascular:      Rate and Rhythm: Normal rate and regular rhythm.      Pulses: Normal pulses.      Heart sounds: Normal heart sounds.   Pulmonary:      Effort: Pulmonary effort is normal.      Breath sounds: Normal breath sounds.   Musculoskeletal:         General: No swelling.   Neurological:      General: No focal deficit present.      Mental Status: She is alert and oriented to person, place, and time.   Psychiatric:         Mood and Affect: Mood normal.         Behavior: Behavior normal.         "

## 2024-08-01 ENCOUNTER — TELEPHONE (OUTPATIENT)
Age: 64
End: 2024-08-01

## 2024-08-07 NOTE — TELEPHONE ENCOUNTER
PA for wegovy  0.25 mg/0.5 mlDenied    Reason:(Screenshot if applicable)        Message sent to office clinical pool Yes    Denial letter scanned into Media Yes    Appeal started No ( Provider will need to decide if appeal is warranted and send clinical documentation to Prior Authorization Team for initiation.)    **Please follow up with your patient regarding denial and next steps**

## 2024-08-07 NOTE — TELEPHONE ENCOUNTER
PA for wegovy 0.25 mg  SUBMITTED     via    []CMM-KEY   [x][employer] ([employer])-Case ID # [id number]   []Faxed to plan   []Other website   []Phone call Case ID #     Office notes sent, clinical questions answered. Awaiting determination    Turnaround time for your insurance to make a decision on your Prior Authorization can take 7-21 business days.

## 2024-08-12 DIAGNOSIS — E66.3 OVERWEIGHT (BMI 25.0-29.9): ICD-10-CM

## 2024-08-12 DIAGNOSIS — F33.42 RECURRENT MAJOR DEPRESSIVE DISORDER, IN FULL REMISSION (HCC): ICD-10-CM

## 2024-08-12 RX ORDER — PHENTERMINE HYDROCHLORIDE 37.5 MG/1
37.5 TABLET ORAL DAILY
Qty: 30 TABLET | Refills: 0 | Status: SHIPPED | OUTPATIENT
Start: 2024-08-12

## 2024-09-05 DIAGNOSIS — E66.3 OVERWEIGHT (BMI 25.0-29.9): ICD-10-CM

## 2024-09-05 DIAGNOSIS — F51.01 PRIMARY INSOMNIA: ICD-10-CM

## 2024-09-05 DIAGNOSIS — F33.42 RECURRENT MAJOR DEPRESSIVE DISORDER, IN FULL REMISSION (HCC): ICD-10-CM

## 2024-09-05 RX ORDER — PHENTERMINE HYDROCHLORIDE 37.5 MG/1
37.5 TABLET ORAL DAILY
Qty: 30 TABLET | Refills: 0 | Status: CANCELLED | OUTPATIENT
Start: 2024-09-05

## 2024-09-06 RX ORDER — ZOLPIDEM TARTRATE 10 MG/1
10 TABLET ORAL
Qty: 90 TABLET | Refills: 0 | Status: SHIPPED | OUTPATIENT
Start: 2024-09-06

## 2024-09-06 RX ORDER — PHENTERMINE HYDROCHLORIDE 37.5 MG/1
37.5 TABLET ORAL DAILY
Qty: 30 TABLET | Refills: 0 | Status: SHIPPED | OUTPATIENT
Start: 2024-09-10

## 2024-09-06 NOTE — TELEPHONE ENCOUNTER
Medication:  PDMP nothing coming on PDMP and unable to switch pharmacies   Active agreement on file -No

## 2024-09-10 ENCOUNTER — TELEPHONE (OUTPATIENT)
Age: 64
End: 2024-09-10

## 2024-09-10 NOTE — TELEPHONE ENCOUNTER
PA for  Phentermine (ADIPEX-P) 37.5 MG tablet DENIED    Reason:(Screenshot if applicable)        Message sent to office clinical pool Yes    Denial letter scanned into Media Yes    Appeal started No     **Please follow up with your patient regarding denial and next steps**

## 2024-09-10 NOTE — TELEPHONE ENCOUNTER
PA for Phentermine (ADIPEX-P) 37.5 MG tablet SUBMITTED     via    []CMM-KEY:   [x][employer]-Case ID # [id number]  []Faxed to plan   []Other website   []Phone call Case ID #     Office notes sent, clinical questions answered. Awaiting determination    Turnaround time for your insurance to make a decision on your Prior Authorization can take 7-21 business days.

## 2024-09-24 LAB
APOB+LDLR+PCSK9 GENE MUT ANL BLD/T: NOT DETECTED
BRCA1+BRCA2 DEL+DUP + FULL MUT ANL BLD/T: NOT DETECTED
MLH1+MSH2+MSH6+PMS2 GN DEL+DUP+FUL M: NOT DETECTED

## 2024-10-04 DIAGNOSIS — F33.42 RECURRENT MAJOR DEPRESSIVE DISORDER, IN FULL REMISSION (HCC): ICD-10-CM

## 2024-10-04 DIAGNOSIS — E66.3 OVERWEIGHT (BMI 25.0-29.9): ICD-10-CM

## 2024-10-04 RX ORDER — PHENTERMINE HYDROCHLORIDE 37.5 MG/1
37.5 TABLET ORAL DAILY
Qty: 30 TABLET | Refills: 0 | Status: SHIPPED | OUTPATIENT
Start: 2024-10-04

## 2024-11-03 DIAGNOSIS — E66.3 OVERWEIGHT (BMI 25.0-29.9): ICD-10-CM

## 2024-11-03 DIAGNOSIS — F33.42 RECURRENT MAJOR DEPRESSIVE DISORDER, IN FULL REMISSION (HCC): ICD-10-CM

## 2024-11-03 RX ORDER — SERTRALINE HYDROCHLORIDE 100 MG/1
100 TABLET, FILM COATED ORAL DAILY
Qty: 90 TABLET | Refills: 1 | Status: SHIPPED | OUTPATIENT
Start: 2024-11-03

## 2024-11-04 RX ORDER — PHENTERMINE HYDROCHLORIDE 37.5 MG/1
37.5 TABLET ORAL DAILY
Qty: 30 TABLET | Refills: 0 | Status: SHIPPED | OUTPATIENT
Start: 2024-11-04

## 2024-11-26 ENCOUNTER — TELEPHONE (OUTPATIENT)
Dept: RHEUMATOLOGY | Facility: CLINIC | Age: 64
End: 2024-11-26

## 2024-11-26 NOTE — TELEPHONE ENCOUNTER
Elizabeth from [facility] called the RX Refill Line. Message is being forwarded to the office.     Elizabeth is requesting an update regarding the Prior Authorization which was faxed to the office 11/21/2024. She provided the following information to their Prior Authorization Department:    Phone: [phone]  Case ID: [id number]    Please review.

## 2024-12-11 DIAGNOSIS — E66.3 OVERWEIGHT (BMI 25.0-29.9): ICD-10-CM

## 2024-12-11 DIAGNOSIS — F33.42 RECURRENT MAJOR DEPRESSIVE DISORDER, IN FULL REMISSION (HCC): ICD-10-CM

## 2024-12-11 RX ORDER — PHENTERMINE HYDROCHLORIDE 37.5 MG/1
37.5 TABLET ORAL DAILY
Qty: 30 TABLET | Refills: 0 | Status: SHIPPED | OUTPATIENT
Start: 2024-12-11

## 2025-01-08 ENCOUNTER — RA CDI HCC (OUTPATIENT)
Dept: OTHER | Facility: HOSPITAL | Age: 65
End: 2025-01-08

## 2025-01-08 DIAGNOSIS — F51.01 PRIMARY INSOMNIA: ICD-10-CM

## 2025-01-09 RX ORDER — ZOLPIDEM TARTRATE 10 MG/1
TABLET ORAL
Qty: 90 TABLET | Refills: 0 | Status: SHIPPED | OUTPATIENT
Start: 2025-01-09

## 2025-01-14 ENCOUNTER — OFFICE VISIT (OUTPATIENT)
Dept: FAMILY MEDICINE CLINIC | Facility: CLINIC | Age: 65
End: 2025-01-14
Payer: COMMERCIAL

## 2025-01-14 VITALS
OXYGEN SATURATION: 98 % | RESPIRATION RATE: 17 BRPM | BODY MASS INDEX: 26.82 KG/M2 | SYSTOLIC BLOOD PRESSURE: 120 MMHG | HEART RATE: 88 BPM | DIASTOLIC BLOOD PRESSURE: 84 MMHG | TEMPERATURE: 98.7 F | WEIGHT: 161 LBS | HEIGHT: 65 IN

## 2025-01-14 DIAGNOSIS — E66.3 OVERWEIGHT (BMI 25.0-29.9): ICD-10-CM

## 2025-01-14 DIAGNOSIS — Z12.31 ENCOUNTER FOR SCREENING MAMMOGRAM FOR BREAST CANCER: ICD-10-CM

## 2025-01-14 DIAGNOSIS — Z00.00 ANNUAL PHYSICAL EXAM: Primary | ICD-10-CM

## 2025-01-14 DIAGNOSIS — J32.0 CHRONIC MAXILLARY SINUSITIS: ICD-10-CM

## 2025-01-14 DIAGNOSIS — M81.0 AGE-RELATED OSTEOPOROSIS WITHOUT CURRENT PATHOLOGICAL FRACTURE: ICD-10-CM

## 2025-01-14 DIAGNOSIS — M06.09 RHEUMATOID ARTHRITIS OF MULTIPLE SITES WITH NEGATIVE RHEUMATOID FACTOR (HCC): ICD-10-CM

## 2025-01-14 DIAGNOSIS — F33.42 RECURRENT MAJOR DEPRESSIVE DISORDER, IN FULL REMISSION (HCC): ICD-10-CM

## 2025-01-14 PROCEDURE — 99214 OFFICE O/P EST MOD 30 MIN: CPT | Performed by: FAMILY MEDICINE

## 2025-01-14 PROCEDURE — 99396 PREV VISIT EST AGE 40-64: CPT | Performed by: FAMILY MEDICINE

## 2025-01-14 RX ORDER — PHENTERMINE HYDROCHLORIDE 37.5 MG/1
37.5 TABLET ORAL DAILY
Qty: 30 TABLET | Refills: 0 | Status: SHIPPED | OUTPATIENT
Start: 2025-01-14

## 2025-01-14 RX ORDER — SERTRALINE HYDROCHLORIDE 100 MG/1
100 TABLET, FILM COATED ORAL DAILY
Qty: 90 TABLET | Refills: 1 | Status: SHIPPED | OUTPATIENT
Start: 2025-01-14

## 2025-01-14 NOTE — PROGRESS NOTES
Adult Annual Physical  Name: Hermelinda Song      : 1960      MRN: [id number]  Encounter Provider: Ginny To MD  Encounter Date: 2025   Encounter department: [facility]    Assessment & Plan  Annual physical exam    Orders:  •  Comprehensive metabolic panel  •  CBC and differential  •  Lipid panel  •  Hemoglobin A1C    Rheumatoid arthritis of multiple sites with negative rheumatoid factor (HCC)  Stable on enbrel  Care per rheumatologist          Recurrent major depressive disorder, in full remission (HCC)  Stable on zoloft  Orders:  •  phentermine (ADIPEX-P) 37.5 MG tablet; Take 1 tablet (37.5 mg total) by mouth daily  •  sertraline (ZOLOFT) 100 mg tablet; Take 1 tablet (100 mg total) by mouth daily    Overweight (BMI 25.0-29.9)  Diet, exercise and portion control  Phentermine as directed  Orders:  •  phentermine (ADIPEX-P) 37.5 MG tablet; Take 1 tablet (37.5 mg total) by mouth daily    Age-related osteoporosis without current pathological fracture  Stable on fosamax       Chronic maxillary sinusitis  Had 2 sinus surgeries in 1 in [place] and  1 in [place]   Had allergy testing done in [place], [place] 2 years ago was normal  Referral to another allergist     Orders:  •  Ambulatory Referral to Allergy; Future    Immunizations and preventive care screenings were discussed with patient today. Appropriate education was printed on patient's after visit summary.    Counseling:  Alcohol/drug use: discussed moderation in alcohol intake, the recommendations for healthy alcohol use, and avoidance of illicit drug use.  Dental Health: discussed importance of regular tooth brushing, flossing, and dental visits.  Injury prevention: discussed safety/seat belts, safety helmets, smoke detectors, carbon monoxide detectors, and smoking near bedding or upholstery.  Sexual health: discussed sexually transmitted diseases, partner selection, use of condoms, avoidance of unintended pregnancy,  and contraceptive alternatives.  Exercise: the importance of regular exercise/physical activity was discussed. Recommend exercise 3-5 times per week for at least 30 minutes.          History of Present Illness     Here for physical   Continued to have sinus pressure, stuffy nose. Daily   None of the OTC meds are helping     Adult Annual Physical:  Patient presents for annual physical.     Diet and Physical Activity:  - Diet/Nutrition: consuming 3-5 servings of fruits/vegetables daily.  - Exercise: moderate cardiovascular exercise.    General Health:  - Sleep: sleeps well and 7-8 hours of sleep on average.  - Hearing: normal hearing bilateral ears.  - Vision: goes for regular eye exams.  - Dental: regular dental visits and brushes teeth twice daily.    /GYN Health:  - Follows with GYN: no.     Review of Systems   Constitutional: Negative.  Negative for chills and fever.   HENT: Negative.  Negative for ear pain and sore throat.    Eyes: Negative.  Negative for pain and visual disturbance.   Respiratory: Negative.  Negative for cough and shortness of breath.    Cardiovascular: Negative.  Negative for chest pain and palpitations.   Gastrointestinal: Negative.  Negative for abdominal pain and vomiting.   Endocrine: Negative.    Genitourinary: Negative.  Negative for dysuria and hematuria.   Musculoskeletal: Negative.  Negative for arthralgias and back pain.   Skin:  Negative for color change and rash.   Allergic/Immunologic: Negative.    Neurological: Negative.  Negative for seizures and syncope.   Hematological: Negative.    Psychiatric/Behavioral: Negative.     All other systems reviewed and are negative.    Pertinent Medical History   none      Medical History Reviewed by provider this encounter:  Allergies  Meds  Problems     .  Past Medical History   Past Medical History:   Diagnosis Date   • Allergic rhinitis    • Anxiety    • Arthritis    • Colon polyp    • Depression    • Epigastric pain     from hiatal  "hernia   • Hiatal hernia    • Insomnia    • Iron deficiency anemia    • Lumbar spondylosis 09/25/2020   • Myopia    • Osteoporosis    • PVD (posterior vitreous detachment)    • RA (rheumatoid arthritis) (HCC)    • Rheumatoid arthritis (HCC)    • Sinusitis      Past Surgical History:   Procedure Laterality Date   • ABDOMINAL SURGERY      \"tummy tuck\"   • AUGMENTATION MAMMAPLASTY Bilateral 2010   • BREAST BIOPSY Right 2008    benign   • COLONOSCOPY     • KNEE ARTHROSCOPY Bilateral    • LASIK     • LUMBAR EPIDURAL INJECTION N/A 10/4/2023    Procedure: L5-S1 LUMBAR epidural steroid injection (75805);  Surgeon: Efren Davalos DO;  Location: [facility] MAIN OR;  Service: Pain Management    • SINUS SURGERY       Family History   Problem Relation Age of Onset   • Hypertension Mother    • Cancer Mother         breast   • Stroke Mother    • Crohn's disease Mother    • Hyperlipidemia Mother    • Heart attack Mother    • Breast cancer Mother 70   • Heart disease Mother         MI   • Hypertension Father    • Stroke Father    • Hyperlipidemia Father    • Heart attack Father         ?   • Skin cancer Father    • Cancer Father    • Hyperlipidemia Brother       reports that she has never smoked. She has never been exposed to tobacco smoke. She has never used smokeless tobacco. She reports current alcohol use of about 2.0 standard drinks of alcohol per week. She reports that she does not currently use drugs.  Current Outpatient Medications on File Prior to Visit   Medication Sig Dispense Refill   • alendronate (FOSAMAX) 70 mg tablet TAKE 1 TABLET EVERY 7 DAYS 12 tablet 3   • etanercept (Enbrel SureClick) 50 MG/ML injection Inject 1 mL (50 mg total) under the skin once a week 12 mL 3   • Ferrous Sulfate (IRON PO) Take by mouth every other day Last dose 3/29/21     • levocetirizine (XYZAL) 5 MG tablet Take 5 mg by mouth daily as needed for allergies     • mometasone (NASONEX) 50 mcg/act nasal spray 2 sprays into each nostril daily 51 " g 5   • multivitamin (THERAGRAN) TABS Take 1 tablet by mouth daily Last dose 3/29/21     • Olopatadine HCl 0.6 % SOLN SPRAY 2 SPRAYS INTO EACH NOSTRIL TWICE A DAY     • zolpidem (AMBIEN) 10 mg tablet TAKE 1 TABLET DAILY AT BEDTIME AS NEEDED FOR SLEEP 90 tablet 0   • [DISCONTINUED] phentermine (ADIPEX-P) 37.5 MG tablet TAKE 1 TABLET (37.5 MG TOTAL) BY MOUTH DAILY DO NOT START BEFORE SEPTEMBER 10, 2024. 30 tablet 0   • [DISCONTINUED] sertraline (ZOLOFT) 100 mg tablet TAKE 1 TABLET DAILY 90 tablet 1   • Calcium Carbonate-Vit D-Min (CALCIUM 1200 PO) Take by mouth every morning (Patient not taking: Reported on 1/14/2025)     • [DISCONTINUED] fluticasone (FLONASE) 50 mcg/act nasal spray 2 sprays into each nostril daily 16 g 5     No current facility-administered medications on file prior to visit.     Allergies   Allergen Reactions   • Plaquenil [Hydroxychloroquine] Rash      Current Outpatient Medications on File Prior to Visit   Medication Sig Dispense Refill   • alendronate (FOSAMAX) 70 mg tablet TAKE 1 TABLET EVERY 7 DAYS 12 tablet 3   • etanercept (Enbrel SureClick) 50 MG/ML injection Inject 1 mL (50 mg total) under the skin once a week 12 mL 3   • Ferrous Sulfate (IRON PO) Take by mouth every other day Last dose 3/29/21     • levocetirizine (XYZAL) 5 MG tablet Take 5 mg by mouth daily as needed for allergies     • mometasone (NASONEX) 50 mcg/act nasal spray 2 sprays into each nostril daily 51 g 5   • multivitamin (THERAGRAN) TABS Take 1 tablet by mouth daily Last dose 3/29/21     • Olopatadine HCl 0.6 % SOLN SPRAY 2 SPRAYS INTO EACH NOSTRIL TWICE A DAY     • zolpidem (AMBIEN) 10 mg tablet TAKE 1 TABLET DAILY AT BEDTIME AS NEEDED FOR SLEEP 90 tablet 0   • [DISCONTINUED] phentermine (ADIPEX-P) 37.5 MG tablet TAKE 1 TABLET (37.5 MG TOTAL) BY MOUTH DAILY DO NOT START BEFORE SEPTEMBER 10, 2024. 30 tablet 0   • [DISCONTINUED] sertraline (ZOLOFT) 100 mg tablet TAKE 1 TABLET DAILY 90 tablet 1   • Calcium Carbonate-Vit D-Min  "(CALCIUM 1200 PO) Take by mouth every morning (Patient not taking: Reported on 1/14/2025)     • [DISCONTINUED] fluticasone (FLONASE) 50 mcg/act nasal spray 2 sprays into each nostril daily 16 g 5     No current facility-administered medications on file prior to visit.      Social History     Tobacco Use   • Smoking status: Never     Passive exposure: Never   • Smokeless tobacco: Never   Vaping Use   • Vaping status: Never Used   Substance and Sexual Activity   • Alcohol use: Yes     Alcohol/week: 2.0 standard drinks of alcohol     Types: 2 Glasses of wine per week     Comment: occ   • Drug use: Not Currently   • Sexual activity: Not on file       Objective   /84 (BP Location: Left arm, Patient Position: Sitting, Cuff Size: Standard)   Pulse 88   Temp 98.7 °F (37.1 °C) (Tympanic)   Resp 17   Ht 5' 4.84\" (1.647 m)   Wt 73 kg (161 lb)   SpO2 98%   BMI 26.92 kg/m²     Physical Exam  Vitals and nursing note reviewed.   Constitutional:       Appearance: Normal appearance. She is well-developed.   HENT:      Head: Normocephalic and atraumatic.      Right Ear: Tympanic membrane normal. There is no impacted cerumen.      Left Ear: Tympanic membrane normal. There is no impacted cerumen.      Nose: Nose normal.      Mouth/Throat:      Mouth: Mucous membranes are moist.   Eyes:      Pupils: Pupils are equal, round, and reactive to light.   Neck:      Thyroid: No thyromegaly.   Cardiovascular:      Rate and Rhythm: Normal rate and regular rhythm.      Heart sounds: No murmur heard.  Pulmonary:      Effort: Pulmonary effort is normal.      Breath sounds: Normal breath sounds.   Abdominal:      General: Bowel sounds are normal.      Palpations: Abdomen is soft.   Musculoskeletal:         General: No deformity. Normal range of motion.      Cervical back: Normal range of motion and neck supple.   Skin:     General: Skin is warm.      Capillary Refill: Capillary refill takes less than 2 seconds.      Findings: No " erythema or rash.   Neurological:      General: No focal deficit present.      Mental Status: She is alert and oriented to person, place, and time.      Deep Tendon Reflexes: Reflexes are normal and symmetric.   Psychiatric:         Mood and Affect: Mood normal.         Behavior: Behavior normal.

## 2025-01-14 NOTE — ASSESSMENT & PLAN NOTE
Had 2 sinus surgeries in 1 in [place] and  1 in [place]   Had allergy testing done in [place], [place] 2 years ago was normal  Referral to another allergist     Orders:  •  Ambulatory Referral to Allergy; Future

## 2025-01-14 NOTE — ASSESSMENT & PLAN NOTE
Diet, exercise and portion control  Phentermine as directed  Orders:  •  phentermine (ADIPEX-P) 37.5 MG tablet; Take 1 tablet (37.5 mg total) by mouth daily

## 2025-01-14 NOTE — ASSESSMENT & PLAN NOTE
Stable on zoloft  Orders:  •  phentermine (ADIPEX-P) 37.5 MG tablet; Take 1 tablet (37.5 mg total) by mouth daily  •  sertraline (ZOLOFT) 100 mg tablet; Take 1 tablet (100 mg total) by mouth daily

## 2025-01-15 ENCOUNTER — TELEPHONE (OUTPATIENT)
Dept: ADMINISTRATIVE | Facility: OTHER | Age: 65
End: 2025-01-15

## 2025-01-15 NOTE — TELEPHONE ENCOUNTER
Upon review of the In Basket request and the patient's chart, initial outreach has been made via fax to facility. Please see Contacts section for details.     Thank you  Ptee Bunn MA

## 2025-01-15 NOTE — TELEPHONE ENCOUNTER
----- Message from Ifeoma OLVERA sent at 1/14/2025  4:06 PM EST -----  Regarding: Care Gap Request  01/14/25 4:06 PM    Hello, our patient above has had Immunization(s) completed/performed. Please assist in updating the patient chart by making an External outreach to [facility] facility located in [place], [place]. The date of service is 01/2025.    Thank you,  PEDRO Aguilar PG FP

## 2025-01-15 NOTE — LETTER
Vaccination Request Form: COVID-19 aka SARS-CoV-2 (Moderna or Pfizer or J & J) and Influenza      Date Requested: 25  Patient: Hermelinda Song     2nd Request  Patient : 1960   Referring Provider: Ginny To MD       The above patient has informed us that they have had their   most recent COVID-19 aka SARS-CoV-2 (Moderna or Pfizer or J & J) and Influenza administered at your facility. Please   complete this form and attach all corresponding documentation.    Date of Vaccine(s) Given  ______________________________    Lot Number(s) _______________________________________    Manufacture(s) ______________________________________    Dose Amount (s) _____________________________________    Expiration Date(s) ____________________________________    Comments __________________________________________________________  ____________________________________________________________________  ____________________________________________________________________  ____________________________________________________________________    Administering Facility  ________________________________________________    Vaccine Administered By (print name) ___________________________________      Form Completed By (print name) _______________________________________      Signature ___________________________________________________________      These reports are needed for  compliance.    Please fax this completed form and a copy of the Vaccine Document(s) to our office located at [address] [place] [address] as soon as possible to Fax [phone] attention Pete: Phone [phone]    We thank you for your assistance in treating our mutual patient.

## 2025-01-15 NOTE — LETTER
Vaccination Request Form: COVID-19 aka SARS-CoV-2 (Moderna or Pfizer or J & J) and Influenza      Date Requested: 01/15/25  Patient: Hermelinda Song  Patient : 1960   Referring Provider: Ginny To MD       The above patient has informed us that they have had their   most recent COVID-19 aka SARS-CoV-2 (Moderna or Pfizer or J & J) and Influenza administered at your facility. Please   complete this form and attach all corresponding documentation.    Date of Vaccine(s) Given  ______________________________    Lot Number(s) _______________________________________    Manufacture(s) ______________________________________    Dose Amount (s) _____________________________________    Expiration Date(s) ____________________________________    Comments __________________________________________________________  ____________________________________________________________________  ____________________________________________________________________  ____________________________________________________________________    Administering Facility  ________________________________________________    Vaccine Administered By (print name) ___________________________________      Form Completed By (print name) _______________________________________      Signature ___________________________________________________________      These reports are needed for  compliance.    Please fax this completed form and a copy of the Vaccine Document(s) to our office located at [address] [place] [address] as soon as possible to Fax [phone] attention Pete: Phone [phone]    We thank you for your assistance in treating our mutual patient.

## 2025-01-22 NOTE — TELEPHONE ENCOUNTER
As a follow-up, a second attempt has been made for outreach via fax to facility. Please see Contacts section for details.    Thank you  Pete Bunn MA

## 2025-01-27 NOTE — TELEPHONE ENCOUNTER
As a final attempt, a third outreach has been made via telephone call to facility. Please see Contacts section for details. This encounter will be closed and completed by end of day. Should we receive the requested information because of previous outreach attempts, the requested patient's chart will be updated appropriately.     Thank you  Pete Bunn MA

## 2025-02-04 ENCOUNTER — PATIENT MESSAGE (OUTPATIENT)
Dept: FAMILY MEDICINE CLINIC | Facility: CLINIC | Age: 65
End: 2025-02-04

## 2025-02-04 NOTE — PATIENT COMMUNICATION
Tried to schedule pt but we did not have any appts after 4. [facility]

## 2025-02-10 DIAGNOSIS — M81.0 AGE-RELATED OSTEOPOROSIS WITHOUT CURRENT PATHOLOGICAL FRACTURE: ICD-10-CM

## 2025-02-10 RX ORDER — ALENDRONATE SODIUM 70 MG/1
TABLET ORAL
Qty: 12 TABLET | Refills: 3 | Status: SHIPPED | OUTPATIENT
Start: 2025-02-10

## 2025-02-18 ENCOUNTER — RESULTS FOLLOW-UP (OUTPATIENT)
Dept: FAMILY MEDICINE CLINIC | Facility: CLINIC | Age: 65
End: 2025-02-18

## 2025-02-18 LAB
ALBUMIN SERPL-MCNC: 4.5 G/DL (ref 3.9–4.9)
ALP SERPL-CCNC: 79 IU/L (ref 44–121)
ALT SERPL-CCNC: 17 IU/L (ref 0–32)
AST SERPL-CCNC: 26 IU/L (ref 0–40)
BASOPHILS # BLD AUTO: 0 X10E3/UL (ref 0–0.2)
BASOPHILS NFR BLD AUTO: 0 %
BILIRUB SERPL-MCNC: 0.5 MG/DL (ref 0–1.2)
BUN SERPL-MCNC: 18 MG/DL (ref 8–27)
BUN/CREAT SERPL: 19 (ref 12–28)
CALCIUM SERPL-MCNC: 9.7 MG/DL (ref 8.7–10.3)
CHLORIDE SERPL-SCNC: 109 MMOL/L (ref 96–106)
CHOLEST SERPL-MCNC: 235 MG/DL (ref 100–199)
CHOLEST/HDLC SERPL: 3 RATIO (ref 0–4.4)
CO2 SERPL-SCNC: 17 MMOL/L (ref 20–29)
CREAT SERPL-MCNC: 0.94 MG/DL (ref 0.57–1)
EGFR: 68 ML/MIN/1.73
EOSINOPHIL # BLD AUTO: 0 X10E3/UL (ref 0–0.4)
EOSINOPHIL NFR BLD AUTO: 0 %
ERYTHROCYTE [DISTWIDTH] IN BLOOD BY AUTOMATED COUNT: 11.7 % (ref 11.7–15.4)
EST. AVERAGE GLUCOSE BLD GHB EST-MCNC: 114 MG/DL
GLOBULIN SER-MCNC: 2.6 G/DL (ref 1.5–4.5)
GLUCOSE SERPL-MCNC: 90 MG/DL (ref 70–99)
HBA1C MFR BLD: 5.6 % (ref 4.8–5.6)
HCT VFR BLD AUTO: 39.8 % (ref 34–46.6)
HDLC SERPL-MCNC: 78 MG/DL
HGB BLD-MCNC: 13.3 G/DL (ref 11.1–15.9)
IMM GRANULOCYTES # BLD: 0 X10E3/UL (ref 0–0.1)
IMM GRANULOCYTES NFR BLD: 0 %
LDLC SERPL CALC-MCNC: 143 MG/DL (ref 0–99)
LYMPHOCYTES # BLD AUTO: 2.4 X10E3/UL (ref 0.7–3.1)
LYMPHOCYTES NFR BLD AUTO: 32 %
MCH RBC QN AUTO: 31.1 PG (ref 26.6–33)
MCHC RBC AUTO-ENTMCNC: 33.4 G/DL (ref 31.5–35.7)
MCV RBC AUTO: 93 FL (ref 79–97)
MONOCYTES # BLD AUTO: 0.5 X10E3/UL (ref 0.1–0.9)
MONOCYTES NFR BLD AUTO: 7 %
NEUTROPHILS # BLD AUTO: 4.6 X10E3/UL (ref 1.4–7)
NEUTROPHILS NFR BLD AUTO: 61 %
PLATELET # BLD AUTO: 347 X10E3/UL (ref 150–450)
POTASSIUM SERPL-SCNC: 4.2 MMOL/L (ref 3.5–5.2)
PROT SERPL-MCNC: 7.1 G/DL (ref 6–8.5)
RBC # BLD AUTO: 4.27 X10E6/UL (ref 3.77–5.28)
SL AMB VLDL CHOLESTEROL CALC: 14 MG/DL (ref 5–40)
SODIUM SERPL-SCNC: 141 MMOL/L (ref 134–144)
TRIGL SERPL-MCNC: 84 MG/DL (ref 0–149)
WBC # BLD AUTO: 7.5 X10E3/UL (ref 3.4–10.8)

## 2025-03-04 DIAGNOSIS — Z12.31 ENCOUNTER FOR SCREENING MAMMOGRAM FOR BREAST CANCER: ICD-10-CM

## 2025-03-06 DIAGNOSIS — F51.01 PRIMARY INSOMNIA: ICD-10-CM

## 2025-03-07 RX ORDER — ZOLPIDEM TARTRATE 10 MG/1
10 TABLET ORAL
Qty: 90 TABLET | Refills: 0 | Status: SHIPPED | OUTPATIENT
Start: 2025-03-07

## 2025-03-08 DIAGNOSIS — E66.3 OVERWEIGHT (BMI 25.0-29.9): ICD-10-CM

## 2025-03-08 DIAGNOSIS — F33.42 RECURRENT MAJOR DEPRESSIVE DISORDER, IN FULL REMISSION (HCC): ICD-10-CM

## 2025-03-10 ENCOUNTER — TELEPHONE (OUTPATIENT)
Age: 65
End: 2025-03-10

## 2025-03-10 RX ORDER — PHENTERMINE HYDROCHLORIDE 37.5 MG/1
37.5 TABLET ORAL DAILY
Qty: 30 TABLET | Refills: 0 | Status: SHIPPED | OUTPATIENT
Start: 2025-03-10

## 2025-03-10 NOTE — TELEPHONE ENCOUNTER
PA for phentermine 37.5 mg SUBMITTED to [facility]     via    []CMM-KEY:   [x][employer]-Case ID # [id number]   [][employer]-Auth ID # NDC #   []Faxed to plan   []Other website   []Phone call Quirino ID #     []PA sent as URGENT    All office notes, labs and other pertaining documents and studies sent. Clinical questions answered. Awaiting determination from insurance company.     Turnaround time for your insurance to make a decision on your Prior Authorization can take 7-21 business days.

## 2025-03-14 ENCOUNTER — OFFICE VISIT (OUTPATIENT)
Dept: FAMILY MEDICINE CLINIC | Facility: CLINIC | Age: 65
End: 2025-03-14
Payer: COMMERCIAL

## 2025-03-14 VITALS
DIASTOLIC BLOOD PRESSURE: 86 MMHG | HEIGHT: 65 IN | RESPIRATION RATE: 17 BRPM | OXYGEN SATURATION: 98 % | SYSTOLIC BLOOD PRESSURE: 120 MMHG | TEMPERATURE: 97.9 F | WEIGHT: 161 LBS | BODY MASS INDEX: 26.82 KG/M2 | HEART RATE: 77 BPM

## 2025-03-14 DIAGNOSIS — J34.89 STUFFY AND RUNNY NOSE: ICD-10-CM

## 2025-03-14 DIAGNOSIS — J01.00 ACUTE MAXILLARY SINUSITIS, RECURRENCE NOT SPECIFIED: Primary | ICD-10-CM

## 2025-03-14 LAB
SARS-COV-2 AG UPPER RESP QL IA: NEGATIVE
VALID CONTROL: NORMAL

## 2025-03-14 PROCEDURE — 87811 SARS-COV-2 COVID19 W/OPTIC: CPT | Performed by: FAMILY MEDICINE

## 2025-03-14 PROCEDURE — 99213 OFFICE O/P EST LOW 20 MIN: CPT | Performed by: FAMILY MEDICINE

## 2025-03-14 RX ORDER — CEFUROXIME AXETIL 500 MG/1
500 TABLET ORAL EVERY 12 HOURS SCHEDULED
Qty: 14 TABLET | Refills: 0 | Status: SHIPPED | OUTPATIENT
Start: 2025-03-14 | End: 2025-03-21

## 2025-03-14 NOTE — PROGRESS NOTES
"Name: Hermelinda Song      : 1960      MRN: [id number]  Encounter Provider: Ginny To MD  Encounter Date: 3/14/2025   Encounter department: [facility]  :  Assessment & Plan  Acute maxillary sinusitis, recurrence not specified  To continue flonase  Orders:  •  cefuroxime (CEFTIN) 500 mg tablet; Take 1 tablet (500 mg total) by mouth every 12 (twelve) hours for 7 days    Stuffy and runny nose    Orders:  •  POCT Rapid Covid Ag           History of Present Illness   Cough  This is a new problem. The current episode started in the past 7 days. The problem has been waxing and waning. The cough is Non-productive. Associated symptoms include nasal congestion, postnasal drip and rhinorrhea. Pertinent negatives include no chest pain, chills, ear congestion, ear pain, fever, headaches, heartburn, hemoptysis, myalgias, rash, sore throat, shortness of breath, sweats, weight loss or wheezing.   Fatigue  Associated symptoms include coughing and fatigue. Pertinent negatives include no chest pain, chills, fever, headaches, myalgias, rash or sore throat.     Review of Systems   Constitutional:  Positive for fatigue. Negative for chills, fever and weight loss.   HENT:  Positive for postnasal drip and rhinorrhea. Negative for ear pain and sore throat.    Respiratory:  Positive for cough. Negative for hemoptysis, shortness of breath and wheezing.    Cardiovascular:  Negative for chest pain.   Gastrointestinal:  Negative for heartburn.   Musculoskeletal:  Negative for myalgias.   Skin:  Negative for rash.   Neurological:  Negative for headaches.       Objective   /86 (BP Location: Left arm, Patient Position: Sitting, Cuff Size: Standard)   Pulse 77   Temp 97.9 °F (36.6 °C) (Tympanic)   Resp 17   Ht 5' 4.84\" (1.647 m)   Wt 73 kg (161 lb)   SpO2 98%   BMI 26.92 kg/m²      Physical Exam  Constitutional:       Appearance: Normal appearance.   HENT:      Nose: Congestion present. No " rhinorrhea.      Right Turbinates: Swollen.      Right Sinus: Maxillary sinus tenderness present.   Cardiovascular:      Rate and Rhythm: Normal rate and regular rhythm.      Pulses: Normal pulses.      Heart sounds: Normal heart sounds.   Pulmonary:      Effort: Pulmonary effort is normal.      Breath sounds: Normal breath sounds.   Neurological:      Mental Status: She is alert.

## 2025-03-17 NOTE — TELEPHONE ENCOUNTER
PA for phentermine 37.5 mg  DENIED    Reason:(Screenshot if applicable)        Message sent to office clinical pool Yes    Denial letter scanned into Media Yes    Appeal started No (Provider will need to decide if appeal is warranted and send clinical documentation to Prior Authorization Team for initiation.)    **Please follow up with your patient regarding denial and next steps**

## 2025-04-15 ENCOUNTER — OFFICE VISIT (OUTPATIENT)
Dept: FAMILY MEDICINE CLINIC | Facility: CLINIC | Age: 65
End: 2025-04-15
Payer: COMMERCIAL

## 2025-04-15 VITALS
OXYGEN SATURATION: 98 % | BODY MASS INDEX: 26.82 KG/M2 | SYSTOLIC BLOOD PRESSURE: 120 MMHG | DIASTOLIC BLOOD PRESSURE: 80 MMHG | RESPIRATION RATE: 17 BRPM | TEMPERATURE: 98.1 F | WEIGHT: 161 LBS | HEART RATE: 81 BPM | HEIGHT: 65 IN

## 2025-04-15 DIAGNOSIS — J32.0 CHRONIC MAXILLARY SINUSITIS: Primary | ICD-10-CM

## 2025-04-15 PROCEDURE — 99213 OFFICE O/P EST LOW 20 MIN: CPT | Performed by: FAMILY MEDICINE

## 2025-04-15 RX ORDER — PREDNISONE 10 MG/1
TABLET ORAL
Qty: 20 TABLET | Refills: 0 | Status: SHIPPED | OUTPATIENT
Start: 2025-04-15

## 2025-04-15 NOTE — PROGRESS NOTES
"Name: Hermelinda Song      : 1960      MRN: [id number]  Encounter Provider: Ginny To MD  Encounter Date: 4/15/2025   Encounter department: [facility]  :  Assessment & Plan  Chronic maxillary sinusitis    Orders:  •  amoxicillin-clavulanate (AUGMENTIN) 875-125 mg per tablet; Take 1 tablet by mouth every 12 (twelve) hours for 10 days  •  predniSONE 10 mg tablet; 4 tabs x 2 days, 3 tabs x 2 days, 2 tabs x 2 days, 1 tab x 2 days           History of Present Illness   HPI  Here for ongoing sinus issues for 2 months   More pressure and pain around her sinuses    Review of Systems   Constitutional: Negative.    HENT:  Positive for congestion, postnasal drip, sinus pressure and sinus pain. Negative for mouth sores.    Eyes: Negative.    Respiratory: Negative.     Cardiovascular: Negative.    Musculoskeletal: Negative.        Objective   /80 (BP Location: Left arm, Patient Position: Sitting, Cuff Size: Standard)   Pulse 81   Temp 98.1 °F (36.7 °C) (Tympanic)   Resp 17   Ht 5' 4.84\" (1.647 m)   Wt 73 kg (161 lb)   SpO2 98%   BMI 26.92 kg/m²      Physical Exam  Vitals and nursing note reviewed.   Constitutional:       Appearance: Normal appearance.   HENT:      Nose: Congestion present. No rhinorrhea.   Cardiovascular:      Rate and Rhythm: Normal rate and regular rhythm.      Pulses: Normal pulses.      Heart sounds: Normal heart sounds.   Pulmonary:      Effort: Pulmonary effort is normal. No respiratory distress.      Breath sounds: Normal breath sounds.   Neurological:      General: No focal deficit present.      Mental Status: She is alert and oriented to person, place, and time.   Psychiatric:         Mood and Affect: Mood normal.         Behavior: Behavior normal.         "

## 2025-04-15 NOTE — ASSESSMENT & PLAN NOTE
Orders:  •  amoxicillin-clavulanate (AUGMENTIN) 875-125 mg per tablet; Take 1 tablet by mouth every 12 (twelve) hours for 10 days  •  predniSONE 10 mg tablet; 4 tabs x 2 days, 3 tabs x 2 days, 2 tabs x 2 days, 1 tab x 2 days

## 2025-05-05 DIAGNOSIS — M06.00 SERONEGATIVE RHEUMATOID ARTHRITIS (HCC): ICD-10-CM

## 2025-05-05 RX ORDER — MEDROXYPROGESTERONE ACETATE 150 MG/ML
50 INJECTION, SUSPENSION INTRAMUSCULAR WEEKLY
Qty: 12 ML | Refills: 3 | Status: SHIPPED | OUTPATIENT
Start: 2025-05-05

## 2025-06-10 ENCOUNTER — NURSE TRIAGE (OUTPATIENT)
Dept: RHEUMATOLOGY | Facility: CLINIC | Age: 65
End: 2025-06-10

## 2025-06-10 DIAGNOSIS — M79.673 HEEL PAIN, UNSPECIFIED LATERALITY: Primary | ICD-10-CM

## 2025-06-10 RX ORDER — MELOXICAM 7.5 MG/1
7.5 TABLET ORAL 2 TIMES DAILY PRN
Qty: 20 TABLET | Refills: 0 | Status: SHIPPED | OUTPATIENT
Start: 2025-06-10

## 2025-06-10 NOTE — PATIENT COMMUNICATION
REASON FOR CONVERSATION: Heel Pain    SYMPTOMS: Left Heel pain last week. Has been doing home care remedies with minimal relief, using Extra Strength Tylenol and arnica ointment at night. Minimal relief.     OTHER HEALTH INFORMATION: [facility]    PROTOCOL DISPOSITION: [unfilled]    CARE ADVICE PROVIDED: Home care advice given. Continue with hot/cold therapy. Topical ointment, using arnica at night. Taking Extra Strength Tylenol. Unable to schedule sooner appt. Has follow up in July.     PRACTICE FOLLOW-UP: Follow up in July, looking for medication for pain relief to be sent to [facility] in [facility].           How long has the current episode been happening? For one week    What joints/areas of the spine are involved? heel  -Are these the typical areas of joint involvement? Non    Please describe the joint symptoms and if they are worse or better with use? How long does it take to loosen up? Has pain 5-6 out of 10, some stiffness when trying to walk to not put pressure on her heel.       Has there been any recent trauma to the affected joint/spine area? No    IF the patient is experiencing spine pain, please ask:    -Is the patient having any numbness or tingling in the extremities?  If yes- is this new? No  -Is the patient having low back pain? No    -Are they having any loss of continence of bowel or bladder? No If yes - please send to ED    -Are they having any numbness of the groin or perianal area?No If yes - Please send to ED    Is the patient having any extraarticular symptoms? No  (eye pain/redness, swelling of areas other than joints [swelling of the entire finger or hand])        - Does the patient have a rash? No If yes, please describe the rash (location, redness, is it raised, is it itchy, is it painful) ask the patient to upload a picture  - Is it a new rash, is this typical for your disease?    Please list current medications patient is taking prescribed by Rheumatology-also include any steroids/NSAIDs  prescribed by any provider:    Extra Strength tylenol Has been using arnica at night.

## 2025-06-10 NOTE — TELEPHONE ENCOUNTER
"Reason for Disposition  • MILD pain (e.g., does not interfere with normal activities) and present > 7 days    Answer Assessment - Initial Assessment Questions  1. ONSET: \"When did the pain start?\"       For a week now  2. LOCATION: \"Where is the pain located?\"       Heel of foot  3. PAIN: \"How bad is the pain?\"    (Scale 1-10; or mild, moderate, severe)      5-6.   4. WORK OR EXERCISE: \"Has there been any recent work or exercise that involved this part of the body?\"       None  5. CAUSE: \"What do you think is causing the foot pain?\"      unsure  6. OTHER SYMPTOMS: \"Do you have any other symptoms?\" (e.g., leg pain, rash, fever, numbness)      None  7. Taking Extra Strength Tylenol for pain.    Protocols used: Foot Pain-Adult-[place]    "

## 2025-06-10 NOTE — TELEPHONE ENCOUNTER
I know she has the esophagitis and prefers to avoid NSAIDs, but would she like to try a short course of an NSAID like meloxicam to see if this helps?  I also recommend she schedule an appointment with podiatry as the heel pain is likely not from her RA and she might need an evaluation for conditions like plantar fasciitis, Achilles tendinitis or bone spurs.

## 2025-06-10 NOTE — TELEPHONE ENCOUNTER
Spoke with pt and she is willing to try the meloxicam if can be sent to the [facility] on file. She will check with her insurance for a podiatrist and let us know if a referral is needed.

## 2025-07-16 PROBLEM — M06.00 SERONEGATIVE RHEUMATOID ARTHRITIS (HCC): Status: ACTIVE | Noted: 2025-07-16

## 2025-07-16 PROBLEM — Z79.83 LONG TERM USE OF BISPHOSPHONATES: Status: ACTIVE | Noted: 2025-07-16

## 2025-07-16 PROBLEM — Z79.60 LONG-TERM USE OF IMMUNOSUPPRESSANT MEDICATION: Status: ACTIVE | Noted: 2025-07-16

## 2025-07-16 PROBLEM — M15.0 PRIMARY GENERALIZED (OSTEO)ARTHRITIS: Status: ACTIVE | Noted: 2025-07-16

## 2025-07-17 ENCOUNTER — TELEPHONE (OUTPATIENT)
Dept: ADMINISTRATIVE | Facility: OTHER | Age: 65
End: 2025-07-17

## 2025-07-17 ENCOUNTER — OFFICE VISIT (OUTPATIENT)
Dept: RHEUMATOLOGY | Facility: CLINIC | Age: 65
End: 2025-07-17
Payer: COMMERCIAL

## 2025-07-17 VITALS
OXYGEN SATURATION: 99 % | DIASTOLIC BLOOD PRESSURE: 70 MMHG | HEIGHT: 65 IN | SYSTOLIC BLOOD PRESSURE: 116 MMHG | HEART RATE: 80 BPM | TEMPERATURE: 98.7 F | BODY MASS INDEX: 27 KG/M2

## 2025-07-17 DIAGNOSIS — M06.00 SERONEGATIVE RHEUMATOID ARTHRITIS (HCC): Primary | ICD-10-CM

## 2025-07-17 DIAGNOSIS — Z11.1 SCREENING-PULMONARY TB: ICD-10-CM

## 2025-07-17 DIAGNOSIS — M25.562 ACUTE PAIN OF LEFT KNEE: ICD-10-CM

## 2025-07-17 DIAGNOSIS — M15.0 PRIMARY GENERALIZED (OSTEO)ARTHRITIS: ICD-10-CM

## 2025-07-17 DIAGNOSIS — R79.89 HIGH SERUM VITAMIN D: ICD-10-CM

## 2025-07-17 DIAGNOSIS — Z79.60 LONG-TERM USE OF IMMUNOSUPPRESSANT MEDICATION: ICD-10-CM

## 2025-07-17 DIAGNOSIS — M81.0 AGE-RELATED OSTEOPOROSIS WITHOUT CURRENT PATHOLOGICAL FRACTURE: ICD-10-CM

## 2025-07-17 DIAGNOSIS — Z79.83 LONG TERM USE OF BISPHOSPHONATES: ICD-10-CM

## 2025-07-17 PROCEDURE — 99214 OFFICE O/P EST MOD 30 MIN: CPT | Performed by: INTERNAL MEDICINE

## 2025-07-17 NOTE — TELEPHONE ENCOUNTER
Upon review of the In Basket request we were able to locate, review, and update the patient chart as requested for DEXA Scan.    Any additional questions or concerns should be emailed to the Practice Liaisons via the appropriate education email address, please do not reply via In Basket.    Thank you  Priscila Blunt MA   [facility]

## 2025-07-17 NOTE — PROGRESS NOTES
Name: Hermelinda Song      : 1960      MRN: [id number]  Encounter Provider: Mariam Mo MD  Encounter Date: 2025   Encounter department: [facility] [place]  :  Assessment & Plan  Seronegative rheumatoid arthritis (HCC)  Ms. Song is a 65-year-old female with history significant for seronegative rheumatoid arthritis, osteoporosis and lumbar degenerative arthritis who presents for a follow-up.  She is currently on subcutaneous etanercept 50 mg weekly.     # Seronegative rheumatoid arthritis  - Hermelinda presents today for a follow-up of seronegative rheumatoid arthritis which has been very stable on subcutaneous etanercept 50 mg once weekly.  She will continue this unchanged and update high risk medication lab monitoring to assess for drug toxicities on an annual basis.      Orders:    C-reactive protein    Sedimentation rate, automated    Long-term use of immunosuppressant medication    Orders:    CBC and differential    Comprehensive metabolic panel    Primary generalized (osteo)arthritis         Acute pain of left knee  - She has been experiencing left knee pain with swelling for the past few days after a mechanical fall 2 weeks ago.  Her symptoms may be related to a sprain or soft tissue injury.  I have low suspicion for a fracture, but if her symptoms persist she will obtain an x-ray.    Orders:    XR knee 3 vw left non injury; Future    Age-related osteoporosis without current pathological fracture  - She will continue oral alendronate 70 mg once weekly which was started in 2022.  She will also continue daily calcium supplements as well as weight-bearing exercises.  I will update a vitamin D level to guide further dosing.  The next DEXA scan can be done in .    - Of note she may have plans for a dental implant and I requested she make me aware if this is scheduled as I will temporarily hold the alendronate.  Through the year she did have dental  extractions done which healed appropriately.  Bisphosphonates were not held.         Long term use of bisphosphonates         High serum vitamin D    Orders:    Vitamin D 25 hydroxy    Screening-pulmonary TB    Orders:    QuantiFERON-TB Gold Plus [facility]      Patient's rheumatologic disease(s) threaten long-term function if not appropriately managed.      History of Present Illness   HPI    INITIAL VISIT NOTE:  Ms. Song is a 58-year-old female with history significant for rheumatoid arthritis, osteopenia and lumbar degenerative arthritis, who presents for further management of rheumatoid arthritis and establishing with [facility].  She is currently on subcutaneous Enbrel 50 mg weekly and methotrexate 8 tablets weekly.  She is referred by Dr. To.      Patient reports she was diagnosed with rheumatoid arthritis at the age of 30 when she initially presented with left knee pain and swelling.  She states since then it has gradually progressed to involve multiple joints.  She has tried multiple medications including hydroxychloroquine which caused a skin rash, gold injections, sulfasalazine and possibly other injectables although she cannot recall the names.  She has been well maintained on a combination of subcutaneous Enbrel 50 mg weekly and methotrexate 8 tablets weekly with folic acid 1 mg daily for the past 18-20 years.  She has not required to change her medications.  She states overall her joint pains have been well controlled although on occasion she will notice an achiness sensation involving her hands, wrists and elbows.  Intermittently she will notice swelling affecting her hands and feet.  She also experiences morning stiffness primarily involving her hands and feet, and states the feet stiffness resolves right away but the hand stiffness persists for less than 30 minutes.  She is currently not taking any steroids or over-the-counter pain medications.  She refrains from NSAID use due  to a history of esophagitis.  She mentions previously she had been on chronic courses of steroids but has not required this in the past few years.  Within the last year she has been dealing with increasing pain affecting her lower back radiating into her bilateral legs associated with a sensation of weakness.  She was seen by Orthopedics in [place] and apparently had imaging done which showed osteoarthritic changes affecting her lumbar spine.  She did complete a course of physical therapy prior to relocating to this area.  She is not interested in seeing [facility] at this time.  She manages her pain with Tylenol as needed.     Overall she has been tolerating the Enbrel and methotrexate well without any concerns for side effects.  She denies fevers, chills, unintentional weight loss, mouth ulcers, skin rash or GI issues.  No other complaints noted at this time.        9/9/2019:  Patient presents for a follow-up today.  She is currently on subcutaneous Enbrel 50 mg weekly and oral methotrexate 8 tablets weekly with folic acid 1 mg daily.     We reviewed her labs done following the prior office visit which revealed an unremarkable CBC, CMP, ESR, CRP, chronic hepatitis panel, QuantiFERON TB gold, vitamin-D, rheumatoid factor and anti CCP antibody.  X-rays of her bilateral hands and feet did not show any inflammatory changes, but showed very minimal degenerative changes.     She reports overall being stable on her current regimen of the Enbrel and methotrexate.  She has not had any flare-ups of joint pain or swelling.  Only on occasion will she experience mild pain around her hips and knees.  She does experience morning stiffness which affects her diffusely, but lasts only a few minutes.  She has not had to take any over-the-counter pain medications for her joint pains.  She has been tolerating both medications well without any concerns for side effects or recurrent infections.  When she does  experience an achy sensation in her bilateral legs she does take Tylenol if needed.     She is unsure when her last bone density exam was, but thinks it may have been more than 2 years ago.  She does take calcium and vitamin-D supplements daily for a history of osteopenia.     She denies any other complaints at this time.        1/10/2020:  Patient presents for follow-up of seronegative rheumatoid arthritis.  She is currently on oral methotrexate at 7 tablets once weekly with folic acid 1 mg daily, and Enbrel 50 mg once weekly.  We reviewed her labs done following the prior office visit which showed an unremarkable CBC and CMP.     She reports overall with decreasing her methotrexate from 8-7 tablets weekly there was no flare-up noted in her joint pains.  She is currently denying any joint pain or swelling.  She experiences 1 minutes of morning stiffness affecting her hands and feet.  She has not had to take any over-the-counter pain medications.  She has overall been tolerating the methotrexate and Enbrel well without any concerns for side effects or infections.     She is yet to schedule the DEXA scan.  She does take calcium and vitamin-D supplements daily.        5/18/2020:  Patient presents for a follow-up of seronegative rheumatoid arthritis.  She is currently on oral methotrexate at 6 tablets once weekly with folic acid 1 mg daily, and Enbrel 50 mg once weekly.  She is due to get her high risk medication lab monitoring done.  We reviewed her DEXA scan done in February 2020 which showed osteopenia, but her 10 year risk of major osteoporotic fracture as calculated by the FRAX score was slightly elevated at 23.8%.     In terms of the rheumatoid arthritis, she reports that she is overall doing well at this time and denies any significant concerns for joint flare-ups including pain, swelling or stiffness.  She is tolerating her regimen well without any concerns for side effects or infections.  Over the past 2  months she has noticed left elbow pain which was initially sharp, but now she describes it as a dull constant pain.  She states that activities such as lifting heavy items will worsen the pain and she feels slightly decreased strength in that arm.  She does take Tylenol as needed which helps.     For the osteopenia history, she does take daily calcium supplements but not vitamin-D.  She denies a history of fragility fractures.        9/21/2020:  Patient presents for a follow-up of seronegative rheumatoid arthritis.  She is currently on oral methotrexate at 6 tablets once weekly with folic acid 1 mg daily, and Enbrel 50 mg once weekly.  I reviewed her labs from July which were normal.     She reports in terms of the rheumatoid arthritis she has been doing very well and denies any joint pains or swelling.  She does experience morning stiffness of her hands which takes a few minutes to improve.  She has been tolerating the methotrexate and Enbrel well without any concerns for side effects or infections.     Over the past few months she has been dealing with low back pain with radiation down her right leg for which she contacted me.  She was referred to Orthopedics and had an MRI of her lumbar spine done which showed multilevel disc herniations and bulges.  She is going to be seeing pain management on Friday to discuss interventional procedures.     For the osteopenia history she is on daily calcium and vitamin-D supplements.  She denies a history of fragility fractures.        3/22/2021:  Patient presents for a follow-up of seronegative rheumatoid arthritis.  She is currently on oral methotrexate at 5-6 tablets once weekly with folic acid 1 mg daily, and Enbrel 50 mg once weekly.  I reviewed her labs from December which were normal.     She reports overall from a rheumatoid arthritis perspective she has been doing well.  She did have an episode of pain affecting her left elbow which resolved, but it may still be  sensitive to light touch.  No other flare-ups of joint pains or swelling.  She does experience morning stiffness which mostly affects her hands and resolves within a few minutes.  She has not been taking any over-the-counter NSAIDs.  She has tolerated the methotrexate and Enbrel well without any concerns for side effects or infections.     She did follow with Orthopedics for the low back pain with right-sided radicular symptoms for which she was seen by pain management and had a steroid injection done which provided her with good relief.  She reports similar symptoms now occurring down her left leg.     For the osteopenia history she is on daily calcium and vitamin-D supplements.  She denies a history of fragility fractures.        9/9/2021:  Patient presents for a follow-up of seronegative rheumatoid arthritis.  She is currently on oral methotrexate at 5 tablets once weekly with folic acid 1 mg daily, and Enbrel 50 mg once weekly.  She has not had recent high risk medication lab monitoring done.      She reports overall from a rheumatoid arthritis perspective she has been stable.  She did try to wean off the methotrexate and states that when she discontinued it she noticed worsening stiffness and swelling in her hands.  In view of this she did restart the methotrexate at 5 tablets once weekly and states that the stiffness has improved as well as the swelling but she is still feeling like her knuckles (PIPs) are big.  Otherwise no significant peripheral joint pains or swelling.  She does experience morning stiffness in her ankles and feet that takes less than an hour to improve.  She is not taking any over-the-counter pain medications.  She has tolerated the methotrexate and Enbrel well without any concerns for side effects or infections.     For the osteopenia history she is on daily calcium and vitamin-D supplements.  She denies a history of fragility fractures.        5/12/2022:  Patient presents for a follow-up  of seronegative rheumatoid arthritis.  She is currently on oral methotrexate at 4 tablets once weekly with folic acid 1 mg daily and Enbrel 50 mg once weekly.  I reviewed her CBC and CMP done in March which were unremarkable.       She reports overall from a rheumatoid arthritis perspective she has been stable and not had any flare-ups in joint pains, swelling or stiffness.  She is still dealing with the low back and left hip region pain but did not respond to injections administered by [facility].  Gabapentin was also ineffective so she discontinued it.  She will follow-up with them as needed.        She reports over the past month she has noticed intermittent pain at the base of her right thumb.  She has been using Voltaren gel twice a day which does help her.     For the osteopenia history she is on daily calcium and vitamin-D supplements.  She denies a history of fragility fractures.        8/3/2022:  Patient presents for an acute visit today due to right base of thumb pain she has been experiencing.  She would like an injection.     We did also review her recent DEXA scan which shows osteoporosis based on the lumbar spine with a 3% decrease in the bone mineral density of the lumbar spine and a 4% decrease in the total bone mineral density of the bilateral hips.  Her 10 year risk of hip fracture is 3% with a 10 year risk of major osteoporotic fracture of 25%.  In view of this we discussed starting her on weekly alendronate and she did so last week.        11/14/2022:  Patient presents for a follow-up of seronegative rheumatoid arthritis.  She is currently on subcutaneous Enbrel 50 mg once weekly.  I reviewed her recent labs done in October which showed an unremarkable CBC, CMP, ESR and CRP.       She reports she has overall been doing well without any flare-ups in joint pain, swelling or stiffness.  Since the last office visit she gradually tapered down on the methotrexate and has been off the  medication for the past 1 and half months.  She has not noticed any change in her joint pains with doing so.  She reports the right CMC intra-articular cortisone injection she received in August significantly helped her.        5/15/2023:  Patient presents for a follow-up of seronegative rheumatoid arthritis.  She is currently on subcutaneous Enbrel 50 mg once weekly.      She reports she has overall been doing well without any flare-ups in joint pain, swelling or stiffness, except for intermittent pain she is again experiencing at her right CMC joint.  She has been tolerating the Enbrel well without any concerns for side effects or infections.     She is also taking alendronate 70 mg once weekly for osteoporosis.  No interim fractures.        5/16/2024:  Patient presents for a follow-up of seronegative rheumatoid arthritis.  She is currently on subcutaneous Enbrel 50 mg once weekly.      She reports she has overall been doing well without any flare-ups in joint pain, swelling or stiffness.  She has been tolerating the Enbrel well without any concerns for side effects or infections.     She is also taking alendronate 70 mg once weekly for osteoporosis.  No interim fractures.      7/17/2025:  Patient presents for a follow-up of seronegative rheumatoid arthritis.  She is currently on subcutaneous etanercept 50 mg once weekly.  I reviewed her recent CBC and CMP which were unremarkable.     She reports she has overall been doing well without any flare-ups in joint pain, swelling or stiffness.  She has been tolerating the etanercept well without any concerns for side effects or infections.  She reports approximately 2 weeks ago she had a mechanical fall and landed on her right side, but she is unsure if she injured her left knee.  For the past week she has been experiencing pain with some fullness of her left knee.     She is taking alendronate 70 mg once weekly for osteoporosis.  No interim fractures.  She does take a  daily calcium supplement but has been avoiding vitamin D due to an elevated level.  She has an active lifestyle.  Her last DEXA scan done in 7/24 shows osteopenia with an improvement in the bone mineral density at the left femoral neck.          Review of Systems  Constitutional: Negative for weight change, fevers, chills, night sweats, fatigue.  ENT/Mouth: Negative for hearing changes, ear pain, nasal congestion, sinus pain, hoarseness, sore throat, rhinorrhea, swallowing difficulty.   Eyes: Negative for pain, redness, discharge, vision changes.   Cardiovascular: Negative for chest pain, SOB, palpitations.   Respiratory: Negative for cough, sputum, wheezing, dyspnea.   Gastrointestinal: Negative for nausea, vomiting, diarrhea, constipation, pain, heartburn.  Genitourinary: Negative for dysuria, urinary frequency, hematuria.   Musculoskeletal: As per HPI.  Skin: Negative for skin rash, color changes.   Neuro: Negative for weakness, numbness, tingling, loss of consciousness.   Psych: Negative for anxiety, depression.   Heme/Lymph: Negative for easy bruising, bleeding, lymphadenopathy.      Past Medical History   Past Medical History[1]  Past Surgical History[2]  Family History[3]   reports that she has never smoked. She has never been exposed to tobacco smoke. She has never used smokeless tobacco. She reports current alcohol use of about 2.0 standard drinks of alcohol per week. She reports that she does not currently use drugs.  Current Outpatient Medications   Medication Instructions    alendronate (FOSAMAX) 70 mg tablet TAKE 1 TABLET EVERY 7 DAYS    Calcium Carbonate-Vit D-Min (CALCIUM 1200 PO) Every morning    Enbrel SureClick 50 mg, Weekly    Ferrous Sulfate (IRON PO) Every other day    Fluticasone Propionate (Xhance) 93 MCG/ACT EXHU 1 spray, Nasal, 2 times daily    ipratropium (ATROVENT) 0.03 % nasal spray 2 sprays, Nasal, Every 12 hours    levocetirizine (XYZAL) 5 mg, Daily PRN    multivitamin (THERAGRAN) TABS  "1 tablet, Daily    phentermine (ADIPEX-P) 37.5 mg, Oral, Daily    sertraline (ZOLOFT) 100 mg, Oral, Daily    zolpidem (AMBIEN) 10 mg, Oral, Daily at bedtime PRN   Allergies[4]   Medications Ordered Prior to Encounter[5]   Social History[6]     Objective   /70 (BP Location: Left arm, Patient Position: Sitting, Cuff Size: Large)   Pulse 80   Temp 98.7 °F (37.1 °C) (Tympanic)   Ht 5' 4.75\" (1.645 m)   SpO2 99%   BMI 27.00 kg/m²      Physical Exam  General: Well appearing, well nourished, in no distress. Oriented x 3, normal mood and affect.  Ambulating without difficulty.  Skin: Good turgor, no rash, unusual bruising or prominent lesions.  Hair: Normal texture and distribution.  HEENT:  Head: Normocephalic, atraumatic.  Eyes: Conjunctiva clear, sclera non-icteric, EOM intact.  Nose: No external lesions.  Neck: Supple.  Extremities: No amputations or deformities, cyanosis, edema.  Musculoskeletal:   There is no peripheral joint soft tissue swelling or tenderness noted, except for mild soft tissue swelling of the left knee.  Neurologic: Alert and oriented. No focal neurological deficits appreciated.   Psychiatric: Normal mood and affect.            [1]   Past Medical History:  Diagnosis Date    Allergic rhinitis     Anxiety     Arthritis     Colon polyp     Depression     Epigastric pain     from hiatal hernia    Hiatal hernia     Insomnia     Iron deficiency anemia     Lumbar spondylosis 09/25/2020    Myopia     Nasal congestion     Osteoporosis     PVD (posterior vitreous detachment)     RA (rheumatoid arthritis) (HCC)     Rheumatoid arthritis (HCC)     Sinusitis    [2]   Past Surgical History:  Procedure Laterality Date    ABDOMINAL SURGERY      \"tummy tuck\"    AUGMENTATION MAMMAPLASTY Bilateral 2010    BREAST BIOPSY Right 2008    benign    COLONOSCOPY      KNEE ARTHROSCOPY Bilateral     LASIK      LUMBAR EPIDURAL INJECTION N/A 10/4/2023    Procedure: L5-S1 LUMBAR epidural steroid injection (55876);  " Surgeon: Efren Davalos DO;  Location: [facility] MAIN OR;  Service: Pain Management     SINUS SURGERY     [3]   Family History  Problem Relation Name Age of Onset    Hypertension Mother Iris     Cancer Mother Iris         Breast and thyroid cancer    Stroke Mother Iris     Crohn's disease Mother Iris     Hyperlipidemia Mother Iris     Heart attack Mother Iris     Breast cancer Mother Iris 70    Heart disease Mother Iris         MI    Depression Mother Iris     Hypertension Father Antonio     Stroke Father Antonio     Hyperlipidemia Father Antonio     Heart attack Father Antonio         ?    Skin cancer Father Antonio     Cancer Father Antonio         Skin spots    Hyperlipidemia Brother Benigno     Cancer Sister Gsiel         Beeast cancer   [4]   Allergies  Allergen Reactions    Plaquenil [Hydroxychloroquine] Rash   [5]   Current Outpatient Medications on File Prior to Visit   Medication Sig Dispense Refill    alendronate (FOSAMAX) 70 mg tablet TAKE 1 TABLET EVERY 7 DAYS 12 tablet 3    Calcium Carbonate-Vit D-Min (CALCIUM 1200 PO) Take by mouth every morning      Enbrel SureClick 50 MG/ML injection INJECT 50 MG (1 ML) UNDER THE SKIN ONCE A WEEK 12 mL 3    Ferrous Sulfate (IRON PO) Take by mouth every other day Last dose 3/29/21      Fluticasone Propionate (Xhance) 93 MCG/ACT EXHU 1 spray into each nostril 2 (two) times a day 16 mL 5    ipratropium (ATROVENT) 0.03 % nasal spray 2 sprays into each nostril every 12 (twelve) hours 30 mL 5    levocetirizine (XYZAL) 5 MG tablet Take 5 mg by mouth daily as needed for allergies      multivitamin (THERAGRAN) TABS Take 1 tablet by mouth in the morning. Last dose 3/29/21 .      sertraline (ZOLOFT) 100 mg tablet Take 1 tablet (100 mg total) by mouth daily 90 tablet 1    zolpidem (AMBIEN) 10 mg tablet Take 1 tablet (10 mg total) by mouth daily at bedtime as needed for sleep 90 tablet 0    phentermine (ADIPEX-P) 37.5 MG tablet Take 1 tablet (37.5 mg total) by mouth daily (Patient not  taking: Reported on 7/17/2025) 30 tablet 0     No current facility-administered medications on file prior to visit.   [6]   Social History  Tobacco Use    Smoking status: Never     Passive exposure: Never    Smokeless tobacco: Never   Vaping Use    Vaping status: Never Used   Substance and Sexual Activity    Alcohol use: Yes     Alcohol/week: 2.0 standard drinks of alcohol     Types: 2 Glasses of wine per week     Comment: occ    Drug use: Not Currently

## 2025-07-17 NOTE — TELEPHONE ENCOUNTER
----- Message from Mariam Mo MD sent at 7/16/2025 11:59 PM EDT -----  Regarding: Care gap request    DEXA  07/16/25 11:59 PM    Hello, our patient attached above has had DEXA Scan completed/performed. Please assist in updating the patient chart by pulling the document from the Imaging/Procedure Tab. The date of service is 7/2024. Please include in quality metric as she will not be able to obtain DEXA again for 2 years.     Thank you,  Mariam Mo MD   [facility]

## 2025-07-17 NOTE — ASSESSMENT & PLAN NOTE
- She will continue oral alendronate 70 mg once weekly which was started in 8/2022.  She will also continue daily calcium supplements as well as weight-bearing exercises.  I will update a vitamin D level to guide further dosing.  The next DEXA scan can be done in 7/26.    - Of note she may have plans for a dental implant and I requested she make me aware if this is scheduled as I will temporarily hold the alendronate.  Through the year she did have dental extractions done which healed appropriately.  Bisphosphonates were not held.

## 2025-07-17 NOTE — ASSESSMENT & PLAN NOTE
Ms. Song is a 65-year-old female with history significant for seronegative rheumatoid arthritis, osteoporosis and lumbar degenerative arthritis who presents for a follow-up.  She is currently on subcutaneous etanercept 50 mg weekly.     # Seronegative rheumatoid arthritis  - Hermelinda presents today for a follow-up of seronegative rheumatoid arthritis which has been very stable on subcutaneous etanercept 50 mg once weekly.  She will continue this unchanged and update high risk medication lab monitoring to assess for drug toxicities on an annual basis.      Orders:    C-reactive protein    Sedimentation rate, automated

## 2025-07-20 DIAGNOSIS — F51.01 PRIMARY INSOMNIA: ICD-10-CM

## 2025-07-21 RX ORDER — ZOLPIDEM TARTRATE 10 MG/1
10 TABLET ORAL
Qty: 90 TABLET | Refills: 0 | Status: SHIPPED | OUTPATIENT
Start: 2025-07-21

## 2025-07-28 DIAGNOSIS — F51.01 PRIMARY INSOMNIA: ICD-10-CM

## 2025-07-29 DIAGNOSIS — F51.01 PRIMARY INSOMNIA: ICD-10-CM

## 2025-07-29 RX ORDER — ZOLPIDEM TARTRATE 10 MG/1
10 TABLET ORAL
Qty: 14 TABLET | Refills: 0 | OUTPATIENT
Start: 2025-07-29

## 2025-07-29 RX ORDER — ZOLPIDEM TARTRATE 10 MG/1
10 TABLET ORAL
Qty: 14 TABLET | Refills: 0 | Status: SHIPPED | OUTPATIENT
Start: 2025-07-29

## 2025-08-18 DIAGNOSIS — F33.42 RECURRENT MAJOR DEPRESSIVE DISORDER, IN FULL REMISSION (HCC): ICD-10-CM

## 2025-08-19 RX ORDER — SERTRALINE HYDROCHLORIDE 100 MG/1
100 TABLET, FILM COATED ORAL DAILY
Qty: 90 TABLET | Refills: 1 | Status: SHIPPED | OUTPATIENT
Start: 2025-08-19

## (undated) DEVICE — CHLORAPREP APPLICATOR TINTED 10.5ML ONE-STEP

## (undated) DEVICE — IV SET EXT SM BORE CARESITE 8IN

## (undated) DEVICE — RADIOLOGY STERILE LABELS: Brand: CENTURION

## (undated) DEVICE — PLASTIC ADHESIVE BANDAGE: Brand: CURITY

## (undated) DEVICE — TRAY EPIDURAL PERIFIX 20GA X 3.5IN TUOHY 8ML

## (undated) DEVICE — TOWEL SET X-RAY

## (undated) DEVICE — SYRINGE 5ML LL

## (undated) DEVICE — SYRINGE EPI 8ML LUER SLIP LOSS OF RESISTANCE PLASTIC PERFIX

## (undated) DEVICE — GLOVE SRG BIOGEL 7.5

## (undated) DEVICE — TRAY PAIN SUPPORT